# Patient Record
Sex: FEMALE | Race: WHITE
[De-identification: names, ages, dates, MRNs, and addresses within clinical notes are randomized per-mention and may not be internally consistent; named-entity substitution may affect disease eponyms.]

---

## 2019-09-12 ENCOUNTER — HOSPITAL ENCOUNTER (INPATIENT)
Dept: HOSPITAL 47 - EC | Age: 54
LOS: 13 days | Discharge: NURSING, REHAB OR LONG-TERM CARE | DRG: 981 | End: 2019-09-25
Attending: HOSPITALIST | Admitting: HOSPITALIST
Payer: COMMERCIAL

## 2019-09-12 VITALS — BODY MASS INDEX: 11.4 KG/M2

## 2019-09-12 DIAGNOSIS — H91.90: ICD-10-CM

## 2019-09-12 DIAGNOSIS — R64: ICD-10-CM

## 2019-09-12 DIAGNOSIS — E86.1: ICD-10-CM

## 2019-09-12 DIAGNOSIS — F41.9: ICD-10-CM

## 2019-09-12 DIAGNOSIS — Z82.5: ICD-10-CM

## 2019-09-12 DIAGNOSIS — R59.0: ICD-10-CM

## 2019-09-12 DIAGNOSIS — M54.5: ICD-10-CM

## 2019-09-12 DIAGNOSIS — J96.22: Primary | ICD-10-CM

## 2019-09-12 DIAGNOSIS — E83.42: ICD-10-CM

## 2019-09-12 DIAGNOSIS — E87.2: ICD-10-CM

## 2019-09-12 DIAGNOSIS — J96.21: ICD-10-CM

## 2019-09-12 DIAGNOSIS — T50.2X5A: ICD-10-CM

## 2019-09-12 DIAGNOSIS — Z82.3: ICD-10-CM

## 2019-09-12 DIAGNOSIS — J90: ICD-10-CM

## 2019-09-12 DIAGNOSIS — G47.00: ICD-10-CM
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
## 2019-09-12 DIAGNOSIS — J38.4: ICD-10-CM

## 2019-09-12 DIAGNOSIS — Z98.890: ICD-10-CM

## 2019-09-12 DIAGNOSIS — J98.11: ICD-10-CM

## 2019-09-12 DIAGNOSIS — Z91.018: ICD-10-CM

## 2019-09-12 DIAGNOSIS — Z82.49: ICD-10-CM

## 2019-09-12 DIAGNOSIS — Z91.013: ICD-10-CM

## 2019-09-12 DIAGNOSIS — Z85.21: ICD-10-CM

## 2019-09-12 DIAGNOSIS — J69.0: ICD-10-CM

## 2019-09-12 DIAGNOSIS — E87.70: ICD-10-CM

## 2019-09-12 DIAGNOSIS — Z92.3: ICD-10-CM

## 2019-09-12 DIAGNOSIS — Z98.51: ICD-10-CM

## 2019-09-12 DIAGNOSIS — J44.9: ICD-10-CM

## 2019-09-12 DIAGNOSIS — T38.0X5A: ICD-10-CM

## 2019-09-12 DIAGNOSIS — I95.9: ICD-10-CM

## 2019-09-12 DIAGNOSIS — G93.41: ICD-10-CM

## 2019-09-12 DIAGNOSIS — Z92.21: ICD-10-CM

## 2019-09-12 DIAGNOSIS — Z87.891: ICD-10-CM

## 2019-09-12 DIAGNOSIS — E43: ICD-10-CM

## 2019-09-12 DIAGNOSIS — E87.3: ICD-10-CM

## 2019-09-12 DIAGNOSIS — E87.6: ICD-10-CM

## 2019-09-12 DIAGNOSIS — Z83.3: ICD-10-CM

## 2019-09-12 DIAGNOSIS — D63.8: ICD-10-CM

## 2019-09-12 DIAGNOSIS — R13.10: ICD-10-CM

## 2019-09-12 DIAGNOSIS — E87.1: ICD-10-CM

## 2019-09-12 LAB
ALBUMIN SERPL-MCNC: 3.5 G/DL (ref 3.5–5)
ALP SERPL-CCNC: 132 U/L (ref 38–126)
ALT SERPL-CCNC: 17 U/L (ref 9–52)
ANION GAP SERPL CALC-SCNC: 12 MMOL/L
APTT BLD: 30.9 SEC (ref 22–30)
AST SERPL-CCNC: 22 U/L (ref 14–36)
BASOPHILS # BLD AUTO: 0.1 K/UL (ref 0–0.2)
BASOPHILS NFR BLD AUTO: 1 %
BUN SERPL-SCNC: 9 MG/DL (ref 7–17)
CALCIUM SPEC-MCNC: 9.1 MG/DL (ref 8.4–10.2)
CHLORIDE SERPL-SCNC: 65 MMOL/L (ref 98–107)
CK SERPL-CCNC: 37 U/L (ref 30–135)
CO2 BLDA-SCNC: 39 MMOL/L (ref 19–24)
CO2 SERPL-SCNC: 34 MMOL/L (ref 22–30)
EOSINOPHIL # BLD AUTO: 0.1 K/UL (ref 0–0.7)
EOSINOPHIL NFR BLD AUTO: 1 %
ERYTHROCYTE [DISTWIDTH] IN BLOOD BY AUTOMATED COUNT: 3.92 M/UL (ref 3.8–5.4)
ERYTHROCYTE [DISTWIDTH] IN BLOOD: 12.2 % (ref 11.5–15.5)
GLUCOSE BLD-MCNC: 179 MG/DL (ref 75–99)
GLUCOSE SERPL-MCNC: 118 MG/DL (ref 74–99)
HCO3 BLDA-SCNC: 35 MMOL/L (ref 21–25)
HCT VFR BLD AUTO: 36.2 % (ref 34–46)
HGB BLD-MCNC: 12.3 GM/DL (ref 11.4–16)
INR PPP: 1.3 (ref ?–1.2)
LYMPHOCYTES # SPEC AUTO: 0 K/UL (ref 1–4.8)
LYMPHOCYTES NFR SPEC AUTO: 0 %
MAGNESIUM SPEC-SCNC: 1.3 MG/DL (ref 1.6–2.3)
MCH RBC QN AUTO: 31.3 PG (ref 25–35)
MCHC RBC AUTO-ENTMCNC: 33.9 G/DL (ref 31–37)
MCV RBC AUTO: 92.5 FL (ref 80–100)
MONOCYTES # BLD AUTO: 0.3 K/UL (ref 0–1)
MONOCYTES NFR BLD AUTO: 3 %
NEUTROPHILS # BLD AUTO: 9.1 K/UL (ref 1.3–7.7)
NEUTROPHILS NFR BLD AUTO: 95 %
PCO2 BLDA: 107 MMHG (ref 35–45)
PH BLDA: 7.13 [PH] (ref 7.35–7.45)
PH UR: 6.5 [PH] (ref 5–8)
PLATELET # BLD AUTO: 371 K/UL (ref 150–450)
PO2 BLDA: 79 MMHG (ref 83–108)
POTASSIUM SERPL-SCNC: 4.9 MMOL/L (ref 3.5–5.1)
PROT SERPL-MCNC: 6.7 G/DL (ref 6.3–8.2)
PT BLD: 13 SEC (ref 9–12)
SODIUM SERPL-SCNC: 111 MMOL/L (ref 137–145)
SP GR UR: 1.02 (ref 1–1.03)
UROBILINOGEN UR QL STRIP: <2 MG/DL (ref ?–2)
WBC # BLD AUTO: 9.7 K/UL (ref 3.8–10.6)

## 2019-09-12 PROCEDURE — 5A1945Z RESPIRATORY VENTILATION, 24-96 CONSECUTIVE HOURS: ICD-10-PCS

## 2019-09-12 PROCEDURE — 84132 ASSAY OF SERUM POTASSIUM: CPT

## 2019-09-12 PROCEDURE — 81003 URINALYSIS AUTO W/O SCOPE: CPT

## 2019-09-12 PROCEDURE — 74177 CT ABD & PELVIS W/CONTRAST: CPT

## 2019-09-12 PROCEDURE — 83735 ASSAY OF MAGNESIUM: CPT

## 2019-09-12 PROCEDURE — 83935 ASSAY OF URINE OSMOLALITY: CPT

## 2019-09-12 PROCEDURE — 87205 SMEAR GRAM STAIN: CPT

## 2019-09-12 PROCEDURE — 84484 ASSAY OF TROPONIN QUANT: CPT

## 2019-09-12 PROCEDURE — 83605 ASSAY OF LACTIC ACID: CPT

## 2019-09-12 PROCEDURE — 99291 CRITICAL CARE FIRST HOUR: CPT

## 2019-09-12 PROCEDURE — 84443 ASSAY THYROID STIM HORMONE: CPT

## 2019-09-12 PROCEDURE — 74230 X-RAY XM SWLNG FUNCJ C+: CPT

## 2019-09-12 PROCEDURE — 84478 ASSAY OF TRIGLYCERIDES: CPT

## 2019-09-12 PROCEDURE — 96375 TX/PRO/DX INJ NEW DRUG ADDON: CPT

## 2019-09-12 PROCEDURE — 86850 RBC ANTIBODY SCREEN: CPT

## 2019-09-12 PROCEDURE — 86901 BLOOD TYPING SEROLOGIC RH(D): CPT

## 2019-09-12 PROCEDURE — 0BH17EZ INSERTION OF ENDOTRACHEAL AIRWAY INTO TRACHEA, VIA NATURAL OR ARTIFICIAL OPENING: ICD-10-PCS

## 2019-09-12 PROCEDURE — 36573 INSJ PICC RS&I 5 YR+: CPT

## 2019-09-12 PROCEDURE — 83690 ASSAY OF LIPASE: CPT

## 2019-09-12 PROCEDURE — 83880 ASSAY OF NATRIURETIC PEPTIDE: CPT

## 2019-09-12 PROCEDURE — 85027 COMPLETE CBC AUTOMATED: CPT

## 2019-09-12 PROCEDURE — 94660 CPAP INITIATION&MGMT: CPT

## 2019-09-12 PROCEDURE — 82550 ASSAY OF CK (CPK): CPT

## 2019-09-12 PROCEDURE — 82533 TOTAL CORTISOL: CPT

## 2019-09-12 PROCEDURE — 82330 ASSAY OF CALCIUM: CPT

## 2019-09-12 PROCEDURE — 93005 ELECTROCARDIOGRAM TRACING: CPT

## 2019-09-12 PROCEDURE — 80053 COMPREHEN METABOLIC PANEL: CPT

## 2019-09-12 PROCEDURE — 85025 COMPLETE CBC W/AUTO DIFF WBC: CPT

## 2019-09-12 PROCEDURE — 43235 EGD DIAGNOSTIC BRUSH WASH: CPT

## 2019-09-12 PROCEDURE — 84100 ASSAY OF PHOSPHORUS: CPT

## 2019-09-12 PROCEDURE — 71045 X-RAY EXAM CHEST 1 VIEW: CPT

## 2019-09-12 PROCEDURE — 80048 BASIC METABOLIC PNL TOTAL CA: CPT

## 2019-09-12 PROCEDURE — 94003 VENT MGMT INPAT SUBQ DAY: CPT

## 2019-09-12 PROCEDURE — 74022 RADEX COMPL AQT ABD SERIES: CPT

## 2019-09-12 PROCEDURE — 76604 US EXAM CHEST: CPT

## 2019-09-12 PROCEDURE — 96361 HYDRATE IV INFUSION ADD-ON: CPT

## 2019-09-12 PROCEDURE — 94002 VENT MGMT INPAT INIT DAY: CPT

## 2019-09-12 PROCEDURE — 36415 COLL VENOUS BLD VENIPUNCTURE: CPT

## 2019-09-12 PROCEDURE — 87040 BLOOD CULTURE FOR BACTERIA: CPT

## 2019-09-12 PROCEDURE — 82805 BLOOD GASES W/O2 SATURATION: CPT

## 2019-09-12 PROCEDURE — 94770: CPT

## 2019-09-12 PROCEDURE — 94640 AIRWAY INHALATION TREATMENT: CPT

## 2019-09-12 PROCEDURE — 96374 THER/PROPH/DIAG INJ IV PUSH: CPT

## 2019-09-12 PROCEDURE — 87070 CULTURE OTHR SPECIMN AEROBIC: CPT

## 2019-09-12 PROCEDURE — 36600 WITHDRAWAL OF ARTERIAL BLOOD: CPT

## 2019-09-12 PROCEDURE — 71275 CT ANGIOGRAPHY CHEST: CPT

## 2019-09-12 PROCEDURE — 86900 BLOOD TYPING SEROLOGIC ABO: CPT

## 2019-09-12 PROCEDURE — 94760 N-INVAS EAR/PLS OXIMETRY 1: CPT

## 2019-09-12 PROCEDURE — 85610 PROTHROMBIN TIME: CPT

## 2019-09-12 PROCEDURE — 84295 ASSAY OF SERUM SODIUM: CPT

## 2019-09-12 PROCEDURE — 84300 ASSAY OF URINE SODIUM: CPT

## 2019-09-12 PROCEDURE — 85730 THROMBOPLASTIN TIME PARTIAL: CPT

## 2019-09-12 NOTE — XR
EXAMINATION TYPE: XR abdomen acute w cxr

 

DATE OF EXAM: 9/12/2019

 

COMPARISON: NONE

 

HISTORY: Short of breath

 

TECHNIQUE:  Chest x-ray with supine and upright abdomen

 

FINDINGS:  

 

There is coarse infiltrate throughout the lungs. Heart size is normal. There is right central venous 
catheter with tip in the right atrium. There is slight blunting right costophrenic angle.

 

There is no sign of intestinal obstruction or pneumoperitoneum. Fecal pattern is normal. There are no
 pathologic calcifications over the kidneys.

 

 

IMPRESSION: 

Nonacute abdomen.

 

Bilateral patchy pulmonary interstitial and alveolar infiltrates. Small right pleural effusion. This 
likely relates to inflammatory disease. No definite heart failure.

## 2019-09-12 NOTE — CT
EXAMINATION TYPE: CT abdomen pelvis w con

 

DATE OF EXAM: 9/12/2019

 

COMPARISON: None

 

HISTORY: Difficulty breathing. History of cancer.

 

CT DLP: 407.9 mGycm

Automated exposure control for dose reduction was used.

 

TECHNIQUE:  Helical acquisition of images was performed from the lung bases through the pelvis.

 

CONTRAST: 

Performed without Oral Contrast and with IV Contrast, patient injected with mL of Isovue 370.

 

FINDINGS: 

 

There are extensive airspace infiltrates at the lung bases. There is atelectasis at the lung bases. T
here are small pleural effusions. Heart size is normal.

 

Liver shows no focal defect. Bile ducts are not dilated. There are calcified splenic granulomata. I s
ee no sign of a pancreatic mass.

 

There is no adrenal mass. Kidneys show satisfactory contrast opacification. There is no hydronephrosi
s. Ureters are not dilated. There is subcutaneous edema around the abdomen that could relate to anasa
rca. There is no sign of free air. There is no evidence of a bowel obstruction. Bladder distends smoo
thly. There is no free fluid in the pelvis. There is no sign of mesenteric edema. Appendix is not see
n. There is no sign of thickened appendix.

 

Lumbar spine is intact. Bony pelvis is intact. There is no compression fracture. There is mild spurri
ng at L4-5.

IMPRESSION: 

BASILAR PULMONARY AIRSPACE INFILTRATE AND ATELECTASIS WITH SMALL PLEURAL EFFUSIONS.

 

NO ACUTE ABNORMALITY WITHIN THE ABDOMEN PELVIS.

## 2019-09-12 NOTE — CT
EXAMINATION TYPE: CT angio chest

 

DATE OF EXAM: 9/12/2019 7:35 PM

 

COMPARISON: None

 

HISTORY: Difficulty breathing. History of cancer.

 

CT DLP: 175.9 mGycm

Automated exposure control for dose reduction was used.

 

CONTRAST: 

CTA scan of the thorax is performed with IV Contrast, patient injected with 83 mL of Isovue 370, pulm
onary embolism protocol. .  

There are 3-D post processed images.

FINDINGS:

 

There is diffuse pulmonary emphysema. There are multiple patchy areas of airspace consolidation in rere
th lungs involving upper lobes and lower lobes. There are small pleural effusions. Heart size is norm
al.

 

There are enlarged bilateral multiple bronchial lymph nodes that measure up to 2 cm. There is subcari
nal adenopathy that measures 3 cm.

 

Thoracic aorta shows no aneurysm or dissection.

 

There is normal contrast opacification of the pulmonary arteries. There are no filling defects.

The thoracic spine shows no compression fracture. I see no bony destructive process.

 

IMPRESSION: 

EMPHYSEMA. EXTENSIVE PULMONARY INTERSTITIAL AND AIRSPACE INFILTRATES.

 

NO EVIDENCE OF PULMONARY EMBOLISM. EXTENSIVE BRONCHIAL ADENOPATHY.

## 2019-09-12 NOTE — ED
Weakness HPI





- General


Stated complaint: TIARRA


Time Seen by Provider: 09/12/19 16:54


Source: RN notes reviewed, old records reviewed


Limitations: no limitations





- History of Present Illness


Initial comments: 





This is a 33-year-old female the ER for evaluation.  Patient coming in for 

multiple complaints severe weakness and inability to sleep and shortness of 

breath.  Patient has history of throat cancer, she admits to severe decreased 

appetite and inability to eat or drink.  Her oxygen is in the low to mid 70s on 

room air and patient is not on home O2.  Patient complaining of chest pain 

abdominal pain and generalized body pain.  No fevers.  Patient presented to 

urgent care was mainly directed the ER for further management.  Patient states 

she's not been on emergency department before.  She has follow-up with doctors 

in [place].


MD Complaint: generalized weakness, lack of energy (Inability to sleep)


-: days(s)


Location: generalized


Severity: moderate


Severity scale (1-10): 7


Consistency: constant


Improves with: none


Worsens with: none


Context: recent illness, history of similar


Associated Symptoms: loss of appetite, nausea/vomiting, shortness of breath





- Related Data


                                Home Medications











 Medication  Instructions  Recorded  Confirmed


 


Ibuprofen [Advil] 200 mg PO Q8HR PRN 09/12/19 09/12/19


 


guaiFENesin [Mucinex] 600 mg PO BID PRN 09/12/19 09/12/19











                                    Allergies











Allergy/AdvReac Type Severity Reaction Status Date / Time


 


No Known Allergies Allergy   Verified 09/12/19 17:35














Review of Systems


ROS Statement: 


Those systems with pertinent positive or pertinent negative responses have been 

documented in the HPI.





ROS Other: All systems not noted in ROS Statement are negative.





General Exam


General appearance: alert, anxious, lethargic, in distress, cachectic


Head exam: Present: atraumatic, normocephalic, normal inspection


Eye exam: Present: normal appearance, EOMI.  Absent: scleral icterus, 

conjunctival injection, periorbital swelling


ENT exam: Present: normal exam, mucous membranes dry


Neck exam: Present: normal inspection.  Absent: tenderness, meningismus, 

lymphadenopathy


Respiratory exam: Present: respiratory distress, wheezes, accessory muscle use, 

decreased breath sounds, prolonged expiratory.  Absent: rales, rhonchi, stridor


Cardiovascular Exam: Present: regular rate, normal rhythm, normal heart sounds. 

 Absent: systolic murmur, diastolic murmur, rubs, gallop, clicks


GI/Abdominal exam: Present: soft, normal bowel sounds.  Absent: distended, 

tenderness, guarding, rebound, rigid


Extremities exam: Present: normal inspection, full ROM, normal capillary refill.

  Absent: tenderness, pedal edema, joint swelling, calf tenderness


Back exam: Present: normal inspection


Neurological exam: Present: alert, oriented X3, CN II-XII intact


Psychiatric exam: Present: normal affect, normal mood


Skin exam: Present: warm, dry, intact, normal color.  Absent: rash





Course


                                   Vital Signs











  09/12/19 09/12/19 09/12/19





  17:02 17:41 17:57


 


Temperature 98.8 F  


 


Pulse Rate 115 H 106 H 107 H


 


Respiratory 17  





Rate   


 


Blood Pressure 121/91  


 


O2 Sat by Pulse 79 L  





Oximetry   














- Reevaluation(s)


Reevaluation #1: 





09/12/19 17:07


Medical record reviewed, noncontributory


Reevaluation #2: 





09/12/19 19:07


Patient again denies recent travel history or history of DVT but does have 

history of CA


09/12/19 19:07








- Consultations


Consultation #1: 





spoke with Dr Howell regarding ICU admission he is agreeable


Consultation #2: 





spoke with Dr Ward regarding admission he is agreeable





EKG Findings





- EKG Comments:


EKG Findings:: EKG shows sinus tach rate of 107, , QRS 74, QTc 416





Medical Decision Making





- Medical Decision Making





53-year-old female presenting respiratory distress from COPD and hypoxia.  

Patient also found to have severe hyponatremia will be started on hypertonic 

saline protocol.  Patient to be admitted ICU for significant retention 

evaluation and monitoring





- Lab Data


Result diagrams: 


                                 09/12/19 17:18





                                 09/12/19 17:18


                                   Lab Results











  09/12/19 09/12/19 09/12/19 Range/Units





  17:18 17:18 17:18 


 


WBC   9.7   (3.8-10.6)  k/uL


 


RBC   3.92   (3.80-5.40)  m/uL


 


Hgb   12.3   (11.4-16.0)  gm/dL


 


Hct   36.2   (34.0-46.0)  %


 


MCV   92.5   (80.0-100.0)  fL


 


MCH   31.3   (25.0-35.0)  pg


 


MCHC   33.9   (31.0-37.0)  g/dL


 


RDW   12.2   (11.5-15.5)  %


 


Plt Count   371   (150-450)  k/uL


 


Neutrophils %   95   %


 


Lymphocytes %   0   %


 


Monocytes %   3   %


 


Eosinophils %   1   %


 


Basophils %   1   %


 


Neutrophils #   9.1 H   (1.3-7.7)  k/uL


 


Lymphocytes #   0.0 L   (1.0-4.8)  k/uL


 


Monocytes #   0.3   (0-1.0)  k/uL


 


Eosinophils #   0.1   (0-0.7)  k/uL


 


Basophils #   0.1   (0-0.2)  k/uL


 


PT     (9.0-12.0)  sec


 


INR     (<1.2)  


 


APTT     (22.0-30.0)  sec


 


Sodium  111 L*    (137-145)  mmol/L


 


Potassium  4.9    (3.5-5.1)  mmol/L


 


Chloride  65 L*    ()  mmol/L


 


Carbon Dioxide  34 H    (22-30)  mmol/L


 


Anion Gap  12    mmol/L


 


BUN  9    (7-17)  mg/dL


 


Creatinine  0.20 L    (0.52-1.04)  mg/dL


 


Est GFR (CKD-EPI)AfAm  >90    (>60 ml/min/1.73 sqM)  


 


Est GFR (CKD-EPI)NonAf  >90    (>60 ml/min/1.73 sqM)  


 


Glucose  118 H    (74-99)  mg/dL


 


Plasma Lactic Acid German    1.7  (0.7-2.0)  mmol/L


 


Calcium  9.1    (8.4-10.2)  mg/dL


 


Phosphorus  3.3    (2.5-4.5)  mg/dL


 


Magnesium  1.3 L    (1.6-2.3)  mg/dL


 


Total Bilirubin  0.7    (0.2-1.3)  mg/dL


 


AST  22    (14-36)  U/L


 


ALT  17    (9-52)  U/L


 


Alkaline Phosphatase  132 H    ()  U/L


 


Creatine Kinase  37    ()  U/L


 


Troponin I     (0.000-0.034)  ng/mL


 


NT-Pro-B Natriuret Pep     pg/mL


 


Total Protein  6.7    (6.3-8.2)  g/dL


 


Albumin  3.5    (3.5-5.0)  g/dL


 


Lipase  10 L    ()  U/L


 


TSH  4.000    (0.465-4.680)  mIU/L


 


Blood Type Recheck     


 


Bld Type Recheck Status     


 


Spec Expiration Date     














  09/12/19 09/12/19 09/12/19 Range/Units





  17:18 17:18 17:18 


 


WBC     (3.8-10.6)  k/uL


 


RBC     (3.80-5.40)  m/uL


 


Hgb     (11.4-16.0)  gm/dL


 


Hct     (34.0-46.0)  %


 


MCV     (80.0-100.0)  fL


 


MCH     (25.0-35.0)  pg


 


MCHC     (31.0-37.0)  g/dL


 


RDW     (11.5-15.5)  %


 


Plt Count     (150-450)  k/uL


 


Neutrophils %     %


 


Lymphocytes %     %


 


Monocytes %     %


 


Eosinophils %     %


 


Basophils %     %


 


Neutrophils #     (1.3-7.7)  k/uL


 


Lymphocytes #     (1.0-4.8)  k/uL


 


Monocytes #     (0-1.0)  k/uL


 


Eosinophils #     (0-0.7)  k/uL


 


Basophils #     (0-0.2)  k/uL


 


PT   13.0 H   (9.0-12.0)  sec


 


INR   1.3 H   (<1.2)  


 


APTT   30.9 H   (22.0-30.0)  sec


 


Sodium     (137-145)  mmol/L


 


Potassium     (3.5-5.1)  mmol/L


 


Chloride     ()  mmol/L


 


Carbon Dioxide     (22-30)  mmol/L


 


Anion Gap     mmol/L


 


BUN     (7-17)  mg/dL


 


Creatinine     (0.52-1.04)  mg/dL


 


Est GFR (CKD-EPI)AfAm     (>60 ml/min/1.73 sqM)  


 


Est GFR (CKD-EPI)NonAf     (>60 ml/min/1.73 sqM)  


 


Glucose     (74-99)  mg/dL


 


Plasma Lactic Acid German     (0.7-2.0)  mmol/L


 


Calcium     (8.4-10.2)  mg/dL


 


Phosphorus     (2.5-4.5)  mg/dL


 


Magnesium     (1.6-2.3)  mg/dL


 


Total Bilirubin     (0.2-1.3)  mg/dL


 


AST     (14-36)  U/L


 


ALT     (9-52)  U/L


 


Alkaline Phosphatase     ()  U/L


 


Creatine Kinase     ()  U/L


 


Troponin I    <0.012  (0.000-0.034)  ng/mL


 


NT-Pro-B Natriuret Pep  502    pg/mL


 


Total Protein     (6.3-8.2)  g/dL


 


Albumin     (3.5-5.0)  g/dL


 


Lipase     ()  U/L


 


TSH     (0.465-4.680)  mIU/L


 


Blood Type Recheck     


 


Bld Type Recheck Status     


 


Spec Expiration Date     














  09/12/19 Range/Units





  17:30 


 


WBC   (3.8-10.6)  k/uL


 


RBC   (3.80-5.40)  m/uL


 


Hgb   (11.4-16.0)  gm/dL


 


Hct   (34.0-46.0)  %


 


MCV   (80.0-100.0)  fL


 


MCH   (25.0-35.0)  pg


 


MCHC   (31.0-37.0)  g/dL


 


RDW   (11.5-15.5)  %


 


Plt Count   (150-450)  k/uL


 


Neutrophils %   %


 


Lymphocytes %   %


 


Monocytes %   %


 


Eosinophils %   %


 


Basophils %   %


 


Neutrophils #   (1.3-7.7)  k/uL


 


Lymphocytes #   (1.0-4.8)  k/uL


 


Monocytes #   (0-1.0)  k/uL


 


Eosinophils #   (0-0.7)  k/uL


 


Basophils #   (0-0.2)  k/uL


 


PT   (9.0-12.0)  sec


 


INR   (<1.2)  


 


APTT   (22.0-30.0)  sec


 


Sodium   (137-145)  mmol/L


 


Potassium   (3.5-5.1)  mmol/L


 


Chloride   ()  mmol/L


 


Carbon Dioxide   (22-30)  mmol/L


 


Anion Gap   mmol/L


 


BUN   (7-17)  mg/dL


 


Creatinine   (0.52-1.04)  mg/dL


 


Est GFR (CKD-EPI)AfAm   (>60 ml/min/1.73 sqM)  


 


Est GFR (CKD-EPI)NonAf   (>60 ml/min/1.73 sqM)  


 


Glucose   (74-99)  mg/dL


 


Plasma Lactic Acid German   (0.7-2.0)  mmol/L


 


Calcium   (8.4-10.2)  mg/dL


 


Phosphorus   (2.5-4.5)  mg/dL


 


Magnesium   (1.6-2.3)  mg/dL


 


Total Bilirubin   (0.2-1.3)  mg/dL


 


AST   (14-36)  U/L


 


ALT   (9-52)  U/L


 


Alkaline Phosphatase   ()  U/L


 


Creatine Kinase   ()  U/L


 


Troponin I   (0.000-0.034)  ng/mL


 


NT-Pro-B Natriuret Pep   pg/mL


 


Total Protein   (6.3-8.2)  g/dL


 


Albumin   (3.5-5.0)  g/dL


 


Lipase   ()  U/L


 


TSH   (0.465-4.680)  mIU/L


 


Blood Type Recheck  No Previous Record  


 


Bld Type Recheck Status  CABO Indicated  


 


Spec Expiration Date  09/15/2019 - 4950  














- Radiology Data


Radiology results: report reviewed (X-ray abdominal surgeries a chest is 

negative for acute disease), image reviewed





Critical Care Time


Critical Care Time: Yes


Total Critical Care Time: 65





Disposition


Clinical Impression: 


 Hyponatremia, Weakness, COPD with hypoxia





Disposition: ADMITTED AS IP TO THIS [facility]


Condition: Serious


Is patient prescribed a controlled substance at d/c from ED?: No


Referrals: 


None,Stated [Primary Care Provider] - 1-2 days

## 2019-09-13 LAB
ALBUMIN SERPL-MCNC: 2.6 G/DL (ref 3.5–5)
ALP SERPL-CCNC: 90 U/L (ref 38–126)
ALT SERPL-CCNC: 15 U/L (ref 9–52)
ANION GAP SERPL CALC-SCNC: 6 MMOL/L
AST SERPL-CCNC: 14 U/L (ref 14–36)
BASOPHILS # BLD AUTO: 0 K/UL (ref 0–0.2)
BASOPHILS NFR BLD AUTO: 0 %
BUN SERPL-SCNC: 7 MG/DL (ref 7–17)
CALCIUM SPEC-MCNC: 8.6 MG/DL (ref 8.4–10.2)
CHLORIDE SERPL-SCNC: 79 MMOL/L (ref 98–107)
CO2 BLDA-SCNC: 35 MMOL/L (ref 19–24)
CO2 BLDA-SCNC: 36 MMOL/L (ref 19–24)
CO2 BLDA-SCNC: 37 MMOL/L (ref 19–24)
CO2 SERPL-SCNC: 32 MMOL/L (ref 22–30)
EOSINOPHIL # BLD AUTO: 0.1 K/UL (ref 0–0.7)
EOSINOPHIL NFR BLD AUTO: 1 %
ERYTHROCYTE [DISTWIDTH] IN BLOOD BY AUTOMATED COUNT: 3.28 M/UL (ref 3.8–5.4)
ERYTHROCYTE [DISTWIDTH] IN BLOOD: 13 % (ref 11.5–15.5)
GLUCOSE BLD-MCNC: 169 MG/DL (ref 75–99)
GLUCOSE BLD-MCNC: 178 MG/DL (ref 75–99)
GLUCOSE BLD-MCNC: 95 MG/DL (ref 75–99)
GLUCOSE SERPL-MCNC: 155 MG/DL (ref 74–99)
HCO3 BLDA-SCNC: 33 MMOL/L (ref 21–25)
HCO3 BLDA-SCNC: 34 MMOL/L (ref 21–25)
HCO3 BLDA-SCNC: 35 MMOL/L (ref 21–25)
HCT VFR BLD AUTO: 31.4 % (ref 34–46)
HGB BLD-MCNC: 10.5 GM/DL (ref 11.4–16)
LYMPHOCYTES # SPEC AUTO: 0.1 K/UL (ref 1–4.8)
LYMPHOCYTES NFR SPEC AUTO: 1 %
MAGNESIUM SPEC-SCNC: 1.5 MG/DL (ref 1.6–2.3)
MCH RBC QN AUTO: 32 PG (ref 25–35)
MCHC RBC AUTO-ENTMCNC: 33.4 G/DL (ref 31–37)
MCV RBC AUTO: 95.8 FL (ref 80–100)
MONOCYTES # BLD AUTO: 0.2 K/UL (ref 0–1)
MONOCYTES NFR BLD AUTO: 3 %
NEUTROPHILS # BLD AUTO: 8.1 K/UL (ref 1.3–7.7)
NEUTROPHILS NFR BLD AUTO: 94 %
PCO2 BLDA: 54 MMHG (ref 35–45)
PCO2 BLDA: 55 MMHG (ref 35–45)
PCO2 BLDA: 67 MMHG (ref 35–45)
PH BLDA: 7.3 [PH] (ref 7.35–7.45)
PH BLDA: 7.41 [PH] (ref 7.35–7.45)
PH BLDA: 7.42 [PH] (ref 7.35–7.45)
PH UR: 7 [PH] (ref 5–8)
PLATELET # BLD AUTO: 383 K/UL (ref 150–450)
PO2 BLDA: 106 MMHG (ref 83–108)
PO2 BLDA: 66 MMHG (ref 83–108)
PO2 BLDA: 75 MMHG (ref 83–108)
POTASSIUM SERPL-SCNC: 3.4 MMOL/L (ref 3.5–5.1)
POTASSIUM SERPL-SCNC: 4 MMOL/L (ref 3.5–5.1)
PROT SERPL-MCNC: 5.2 G/DL (ref 6.3–8.2)
SODIUM SERPL-SCNC: 117 MMOL/L (ref 137–145)
SODIUM SERPL-SCNC: 122 MMOL/L (ref 137–145)
SP GR UR: 1.01 (ref 1–1.03)
UROBILINOGEN UR QL STRIP: <2 MG/DL (ref ?–2)
WBC # BLD AUTO: 8.6 K/UL (ref 3.8–10.6)

## 2019-09-13 PROCEDURE — 3E0G76Z INTRODUCTION OF NUTRITIONAL SUBSTANCE INTO UPPER GI, VIA NATURAL OR ARTIFICIAL OPENING: ICD-10-PCS

## 2019-09-13 PROCEDURE — 05HC33Z INSERTION OF INFUSION DEVICE INTO LEFT BASILIC VEIN, PERCUTANEOUS APPROACH: ICD-10-PCS

## 2019-09-13 PROCEDURE — 02HV33Z INSERTION OF INFUSION DEVICE INTO SUPERIOR VENA CAVA, PERCUTANEOUS APPROACH: ICD-10-PCS

## 2019-09-13 PROCEDURE — 0DH67UZ INSERTION OF FEEDING DEVICE INTO STOMACH, VIA NATURAL OR ARTIFICIAL OPENING: ICD-10-PCS

## 2019-09-13 RX ADMIN — MORPHINE SULFATE PRN MG: 4 INJECTION, SOLUTION INTRAMUSCULAR; INTRAVENOUS at 18:13

## 2019-09-13 RX ADMIN — HEPARIN SODIUM SCH UNIT: 5000 INJECTION, SOLUTION INTRAVENOUS; SUBCUTANEOUS at 23:49

## 2019-09-13 RX ADMIN — IPRATROPIUM BROMIDE AND ALBUTEROL SULFATE PRN ML: .5; 3 SOLUTION RESPIRATORY (INHALATION) at 11:54

## 2019-09-13 RX ADMIN — LEVOFLOXACIN SCH MLS/HR: 5 INJECTION, SOLUTION INTRAVENOUS at 10:47

## 2019-09-13 RX ADMIN — POTASSIUM BICARBONATE SCH MEQ: 782 TABLET, EFFERVESCENT ORAL at 06:50

## 2019-09-13 RX ADMIN — MAGNESIUM SULFATE IN DEXTROSE SCH MLS/HR: 10 INJECTION, SOLUTION INTRAVENOUS at 06:50

## 2019-09-13 RX ADMIN — CHLORHEXIDINE GLUCONATE SCH ML: 1.2 RINSE ORAL at 21:10

## 2019-09-13 RX ADMIN — NOREPINEPHRINE BITARTRATE SCH MLS/HR: 1 INJECTION, SOLUTION, CONCENTRATE INTRAVENOUS at 10:01

## 2019-09-13 RX ADMIN — NOREPINEPHRINE BITARTRATE SCH MLS/HR: 1 INJECTION, SOLUTION, CONCENTRATE INTRAVENOUS at 00:00

## 2019-09-13 RX ADMIN — PROPOFOL SCH MLS/HR: 10 INJECTION, EMULSION INTRAVENOUS at 19:40

## 2019-09-13 RX ADMIN — PIPERACILLIN AND TAZOBACTAM SCH MLS/HR: 3; .375 INJECTION, POWDER, FOR SOLUTION INTRAVENOUS at 10:24

## 2019-09-13 RX ADMIN — HEPARIN SODIUM SCH UNIT: 5000 INJECTION, SOLUTION INTRAVENOUS; SUBCUTANEOUS at 16:32

## 2019-09-13 RX ADMIN — PIPERACILLIN AND TAZOBACTAM SCH MLS/HR: 3; .375 INJECTION, POWDER, FOR SOLUTION INTRAVENOUS at 21:09

## 2019-09-13 RX ADMIN — IPRATROPIUM BROMIDE AND ALBUTEROL SULFATE PRN ML: .5; 3 SOLUTION RESPIRATORY (INHALATION) at 07:36

## 2019-09-13 RX ADMIN — IPRATROPIUM BROMIDE AND ALBUTEROL SULFATE PRN ML: .5; 3 SOLUTION RESPIRATORY (INHALATION) at 19:30

## 2019-09-13 RX ADMIN — PROPOFOL SCH MLS/HR: 10 INJECTION, EMULSION INTRAVENOUS at 00:00

## 2019-09-13 RX ADMIN — IPRATROPIUM BROMIDE AND ALBUTEROL SULFATE PRN ML: .5; 3 SOLUTION RESPIRATORY (INHALATION) at 15:59

## 2019-09-13 RX ADMIN — HEPARIN SODIUM SCH UNIT: 5000 INJECTION, SOLUTION INTRAVENOUS; SUBCUTANEOUS at 08:03

## 2019-09-13 RX ADMIN — MAGNESIUM SULFATE IN DEXTROSE SCH MLS/HR: 10 INJECTION, SOLUTION INTRAVENOUS at 05:49

## 2019-09-13 RX ADMIN — CHLORHEXIDINE GLUCONATE SCH ML: 1.2 RINSE ORAL at 08:03

## 2019-09-13 RX ADMIN — POTASSIUM BICARBONATE SCH MEQ: 782 TABLET, EFFERVESCENT ORAL at 05:59

## 2019-09-13 RX ADMIN — PANTOPRAZOLE SODIUM SCH MG: 40 INJECTION, POWDER, FOR SOLUTION INTRAVENOUS at 08:03

## 2019-09-13 NOTE — XR
EXAM:

  XR Chest, 1 View

 

CLINICAL HISTORY:

  ITS.REASON XR Reason: line placement

 

TECHNIQUE:

  Frontal view of the chest.

 

COMPARISON:

  No relevant prior studies available.

 

FINDINGS:

  See Impression:

 

IMPRESSION:   

1.  Lines/tubes:

2.  Endotracheal tube tip located approximately 5 cm above the lesli. 

Transesophageal catheter side port below the GE junction, tip off the 

examination. Chest port identified with tip at or just below the 

cavoatrial junction.

3.  Bibasal pleural-parenchymal disease with suspected pleural effusions 

present.

4.  Infiltrate at the left apex.

5.  Interstitial alveolar disease at the right mid to lower lung zone.  

Prominent interstitial thickening involving the upper lobes.  

Differential includes atypical infection pulmonary edema.

## 2019-09-13 NOTE — P.PN
Subjective


Progress Note Date: 09/13/19


Principal diagnosis: 





The patient is a 53-year-old  female with a history of laryngeal cancer

status post chemo and radiation therapy that was admitted for acute respiratory 

failure with hypoxia, acute metabolic encephalopathy and symptomatic 

hyponatremia after presenting with progressive generalized weakness and 

difficulty breathing.  A workup with CT of her chest abdomen pelvis showed 

extensive bilateral lung infiltrates and consolidation with bilateral bronchial 

lymph node enlargement and subcarinal lymph node enlargement, initial Arterial 

blood gases revealed a pO2 of 79, pCO2 of 107 and a pH of 7.13 on 32% FiO2 and 

the patient was intubated and placed on mechanical ventilator.  She was started 

on empiric IV antibiotics with Levaquin and Zosyn, serum sodium level was 

profoundly low at 111 the patient was given hypertonic 3% saline at 20 mL an 

hour and subsequent switched to normal saline with nephrology Dr. Hutton 

consulted to manage the patient's hyponatremia.  The patient was sedated on 

propofol and started on levophed drip to keep map greater than 65.  





Patient seen and examined in follow-up today, intubated on mechanical 

ventilatory sedated on propofol.  The Levophed is going at 5.8 mcg/m, propofol 

at 50 mcg/kg/m. patient having adequate urine output





Objective





- Vital Signs


Vital signs: 


                                   Vital Signs











Temp  98.1 F   09/13/19 16:00


 


Pulse  74   09/13/19 17:00


 


Resp  20   09/13/19 17:00


 


BP  96/62   09/13/19 17:00


 


Pulse Ox  100   09/13/19 17:00








                                 Intake & Output











 09/12/19 09/13/19 09/13/19





 18:59 06:59 18:59


 


Intake Total  737.808 4833.711


 


Output Total  1250 1684


 


Balance  -394.102 262.711


 


Weight 34.019 kg 38.8 kg 38.8 kg


 


Intake:   


 


  IV  655 1050


 


    .9 kvo  150 200


 


    Dextrose 5% in Water 1,  250 650





    000 ml @ 50 mls/hr IV .   





    Q20H [facility] Rx#:[id number]   


 


    Dextrose 5%-0.45% NaCl 1,  255 





    000 ml @ 85 mls/hr IV .   





    [id number] [facility] Rx#:[id number]   


 


    Magnesium Sulfate-D5w Pmx   200





    1 gm In Dextrose/Water 1   





    100ml.bag @ 100 mls/hr   





    IVPB Q1H [facility] Rx#:   





    [id number]   


 


  Intake, IV Titration  140.898 886.711





  Amount   


 


    Levofloxacin 500Mg-D5w   100





    Pmx 500 mg In Dextrose/   





    Water 1 100ml.bag @ 100   





    mls/hr IVPB Q24H [facility] Rx#:   





    [id number]   


 


    Norepinephrine 4 mg In  133.717 186.711





    Sodium Chloride 0.9% 250   





    ml @ 0.05 MCG/KG/MIN 6.   





    481 mls/hr IV .Q24H [facility]   





    Rx#:[id number]   


 


    Piperacillin-Tazobactam 3   100





    .375 gm In Sodium   





    Chloride 0.9% 100 ml @ 25   





    mls/hr IVPB Q12HR [facility] Rx   





    #:[id number]   


 


    Propofol 1,000 mg In  7.181 





    Empty Bag 1 bag @ Titrate   





    IV .Q0M [facility] Rx#:   





    [id number]   


 


    Sodium Chloride 0.9% 500   500





    ml 500 ml @ 999 mls/hr IV   





    .Q31M [facility] Rx#:[id number]   


 


  Tube Feeding   10


 


  Other  60 


 


Output:   


 


  Urine  1250 1684


 


Other:   


 


  Voiding Method  Indwelling Catheter Indwelling Catheter








                       ABP, PAP, CO, CI - Last Documented











Arterial Blood Pressure        103/63

















- Exam





Constitutional: No acute distress, sedated and intubated, cachectic appearance





Eyes: Anicteric sclerae, moist conjunctiva, no lid-lag, PERRLA





ENMT: Bilateral temporal wasting oral endotracheal and G-tube secured in place 

NC/AT,Oropharynx clear, no erythema, exudates





Neck:Supple, FROM, no masses, or JVD, No carotid bruits; No thyromegaly





Lungs: Right Mediport subclavian in place no chest wall deformity





Cardiovascular: Heart regular in rate and rhythm,  No murmurs, gallops, or rubs 

no peripheral edema





Abdominal: Soft Nontender, nom distended, no guarding, no rebound or  rigidity, 

Normoactive bowel sounds No hepatomegaly, No splenomegaly,  No palpable mass No 

abdominal wall hernia noted 





Skin: Normal temperature, tone, texture, turgor, No induration No subcutaneous 

nodules, No rash, lesions, No ulcers





Extremities:No digital cyanosis No clubbing, Pedal pulses intact and  

symmetrical Radial pulses intact and symmetrical Normal gait and station, No 

calf tenderness   


      


Neuro: Sedated on propofol unable to fully assess








- Labs


CBC & Chem 7: 


                                 09/13/19 04:55





                                 09/13/19 16:33


Labs: 


                  Abnormal Lab Results - Last 24 Hours (Table)











  09/12/19 09/12/19 09/12/19 Range/Units





  17:18 17:18 17:18 


 


RBC     (3.80-5.40)  m/uL


 


Hgb     (11.4-16.0)  gm/dL


 


Hct     (34.0-46.0)  %


 


Neutrophils #   9.1 H   (1.3-7.7)  k/uL


 


Lymphocytes #   0.0 L   (1.0-4.8)  k/uL


 


PT    13.0 H  (9.0-12.0)  sec


 


INR    1.3 H  (<1.2)  


 


APTT    30.9 H  (22.0-30.0)  sec


 


ABG pH     (7.35-7.45)  


 


ABG pCO2     (35-45)  mmHg


 


ABG pO2     ()  mmHg


 


ABG HCO3     (21-25)  mmol/L


 


ABG Total CO2     (19-24)  mmol/L


 


ABG O2 Saturation     (94-97)  %


 


Sodium  111 L*    (137-145)  mmol/L


 


Potassium     (3.5-5.1)  mmol/L


 


Chloride  65 L*    ()  mmol/L


 


Carbon Dioxide  34 H    (22-30)  mmol/L


 


Creatinine  0.20 L    (0.52-1.04)  mg/dL


 


Glucose  118 H    (74-99)  mg/dL


 


POC Glucose (mg/dL)     (75-99)  mg/dL


 


Magnesium  1.3 L    (1.6-2.3)  mg/dL


 


Alkaline Phosphatase  132 H    ()  U/L


 


Total Protein     (6.3-8.2)  g/dL


 


Albumin     (3.5-5.0)  g/dL


 


Lipase  10 L    ()  U/L


 


Urine Glucose (UA)     (Negative)  














  09/12/19 09/12/19 09/12/19 Range/Units





  19:58 21:15 21:27 


 


RBC     (3.80-5.40)  m/uL


 


Hgb     (11.4-16.0)  gm/dL


 


Hct     (34.0-46.0)  %


 


Neutrophils #     (1.3-7.7)  k/uL


 


Lymphocytes #     (1.0-4.8)  k/uL


 


PT     (9.0-12.0)  sec


 


INR     (<1.2)  


 


APTT     (22.0-30.0)  sec


 


ABG pH     (7.35-7.45)  


 


ABG pCO2     (35-45)  mmHg


 


ABG pO2     ()  mmHg


 


ABG HCO3     (21-25)  mmol/L


 


ABG Total CO2     (19-24)  mmol/L


 


ABG O2 Saturation     (94-97)  %


 


Sodium  116 L*    (137-145)  mmol/L


 


Potassium     (3.5-5.1)  mmol/L


 


Chloride     ()  mmol/L


 


Carbon Dioxide     (22-30)  mmol/L


 


Creatinine     (0.52-1.04)  mg/dL


 


Glucose     (74-99)  mg/dL


 


POC Glucose (mg/dL)    179 H  (75-99)  mg/dL


 


Magnesium     (1.6-2.3)  mg/dL


 


Alkaline Phosphatase     ()  U/L


 


Total Protein     (6.3-8.2)  g/dL


 


Albumin     (3.5-5.0)  g/dL


 


Lipase     ()  U/L


 


Urine Glucose (UA)   Trace H   (Negative)  














  09/12/19 09/13/19 09/13/19 Range/Units





  23:15 00:20 01:12 


 


RBC     (3.80-5.40)  m/uL


 


Hgb     (11.4-16.0)  gm/dL


 


Hct     (34.0-46.0)  %


 


Neutrophils #     (1.3-7.7)  k/uL


 


Lymphocytes #     (1.0-4.8)  k/uL


 


PT     (9.0-12.0)  sec


 


INR     (<1.2)  


 


APTT     (22.0-30.0)  sec


 


ABG pH  7.13 L*  7.30 L   (7.35-7.45)  


 


ABG pCO2  107 H*  67 H   (35-45)  mmHg


 


ABG pO2  79 L  66 L   ()  mmHg


 


ABG HCO3  35 H  33 H   (21-25)  mmol/L


 


ABG Total CO2  39 H  35 H   (19-24)  mmol/L


 


ABG O2 Saturation  90.3 L  91.1 L   (94-97)  %


 


Sodium    118 L*  (137-145)  mmol/L


 


Potassium     (3.5-5.1)  mmol/L


 


Chloride     ()  mmol/L


 


Carbon Dioxide     (22-30)  mmol/L


 


Creatinine     (0.52-1.04)  mg/dL


 


Glucose     (74-99)  mg/dL


 


POC Glucose (mg/dL)     (75-99)  mg/dL


 


Magnesium     (1.6-2.3)  mg/dL


 


Alkaline Phosphatase     ()  U/L


 


Total Protein     (6.3-8.2)  g/dL


 


Albumin     (3.5-5.0)  g/dL


 


Lipase     ()  U/L


 


Urine Glucose (UA)     (Negative)  














  09/13/19 09/13/19 09/13/19 Range/Units





  04:55 04:55 04:57 


 


RBC  3.28 L    (3.80-5.40)  m/uL


 


Hgb  10.5 L    (11.4-16.0)  gm/dL


 


Hct  31.4 L    (34.0-46.0)  %


 


Neutrophils #  8.1 H    (1.3-7.7)  k/uL


 


Lymphocytes #  0.1 L    (1.0-4.8)  k/uL


 


PT     (9.0-12.0)  sec


 


INR     (<1.2)  


 


APTT     (22.0-30.0)  sec


 


ABG pH     (7.35-7.45)  


 


ABG pCO2     (35-45)  mmHg


 


ABG pO2     ()  mmHg


 


ABG HCO3     (21-25)  mmol/L


 


ABG Total CO2     (19-24)  mmol/L


 


ABG O2 Saturation     (94-97)  %


 


Sodium   117 L*   (137-145)  mmol/L


 


Potassium   3.4 L   (3.5-5.1)  mmol/L


 


Chloride   79 L   ()  mmol/L


 


Carbon Dioxide   32 H   (22-30)  mmol/L


 


Creatinine   0.16 L   (0.52-1.04)  mg/dL


 


Glucose   155 H   (74-99)  mg/dL


 


POC Glucose (mg/dL)    169 H  (75-99)  mg/dL


 


Magnesium   1.5 L   (1.6-2.3)  mg/dL


 


Alkaline Phosphatase     ()  U/L


 


Total Protein   5.2 L   (6.3-8.2)  g/dL


 


Albumin   2.6 L   (3.5-5.0)  g/dL


 


Lipase   <10 L   ()  U/L


 


Urine Glucose (UA)     (Negative)  














  09/13/19 09/13/19 09/13/19 Range/Units





  06:55 11:30 11:46 


 


RBC     (3.80-5.40)  m/uL


 


Hgb     (11.4-16.0)  gm/dL


 


Hct     (34.0-46.0)  %


 


Neutrophils #     (1.3-7.7)  k/uL


 


Lymphocytes #     (1.0-4.8)  k/uL


 


PT     (9.0-12.0)  sec


 


INR     (<1.2)  


 


APTT     (22.0-30.0)  sec


 


ABG pH     (7.35-7.45)  


 


ABG pCO2  55 H    (35-45)  mmHg


 


ABG pO2  75 L    ()  mmHg


 


ABG HCO3  35 H    (21-25)  mmol/L


 


ABG Total CO2  37 H    (19-24)  mmol/L


 


ABG O2 Saturation     (94-97)  %


 


Sodium   122 L   (137-145)  mmol/L


 


Potassium     (3.5-5.1)  mmol/L


 


Chloride     ()  mmol/L


 


Carbon Dioxide     (22-30)  mmol/L


 


Creatinine     (0.52-1.04)  mg/dL


 


Glucose     (74-99)  mg/dL


 


POC Glucose (mg/dL)    178 H  (75-99)  mg/dL


 


Magnesium     (1.6-2.3)  mg/dL


 


Alkaline Phosphatase     ()  U/L


 


Total Protein     (6.3-8.2)  g/dL


 


Albumin     (3.5-5.0)  g/dL


 


Lipase     ()  U/L


 


Urine Glucose (UA)     (Negative)  














  09/13/19 09/13/19 Range/Units





  11:47 16:33 


 


RBC    (3.80-5.40)  m/uL


 


Hgb    (11.4-16.0)  gm/dL


 


Hct    (34.0-46.0)  %


 


Neutrophils #    (1.3-7.7)  k/uL


 


Lymphocytes #    (1.0-4.8)  k/uL


 


PT    (9.0-12.0)  sec


 


INR    (<1.2)  


 


APTT    (22.0-30.0)  sec


 


ABG pH    (7.35-7.45)  


 


ABG pCO2  54 H   (35-45)  mmHg


 


ABG pO2    ()  mmHg


 


ABG HCO3  34 H   (21-25)  mmol/L


 


ABG Total CO2  36 H   (19-24)  mmol/L


 


ABG O2 Saturation  98.2 H   (94-97)  %


 


Sodium   123 L  (137-145)  mmol/L


 


Potassium    (3.5-5.1)  mmol/L


 


Chloride    ()  mmol/L


 


Carbon Dioxide    (22-30)  mmol/L


 


Creatinine    (0.52-1.04)  mg/dL


 


Glucose    (74-99)  mg/dL


 


POC Glucose (mg/dL)    (75-99)  mg/dL


 


Magnesium    (1.6-2.3)  mg/dL


 


Alkaline Phosphatase    ()  U/L


 


Total Protein    (6.3-8.2)  g/dL


 


Albumin    (3.5-5.0)  g/dL


 


Lipase    ()  U/L


 


Urine Glucose (UA)    (Negative)  








                      Microbiology - Last 24 Hours (Table)











 09/13/19 11:50 Sputum Culture - Preliminary





 Sputum 














Assessment and Plan


(1) Acute respiratory failure with hypoxia and hypercapnia


Narrative/Plan: 





* Patient sedated and ventilated with pulmonary following


* CT of the chest showing pulmonary interstitial and airspace infiltrates po

  ssible underlying pneumonia


* Continue vent management per pulmonary


* Continue with breathing treatments and antibiotics


Current Visit: Yes   Status: Acute   Code(s): J96.01 - ACUTE RESPIRATORY FAILURE

WITH HYPOXIA; J96.02 - ACUTE RESPIRATORY FAILURE WITH HYPERCAPNIA   SNOMED 

Code(s): 715324077


   





(2) Hyponatremia


Narrative/Plan: 





* Severe profound hyponatremia on presentation with a sodium of 111 now up to 

  117


* Previously on hypertonic saline, nephrology following closely


Current Visit: Yes   Status: Acute   Code(s): E87.1 - HYPO-OSMOLALITY AND 

HYPONATREMIA   SNOMED Code(s): 49174345


   





(3) Mediastinal lymphadenopathy


Narrative/Plan: 





* CT of the chest showing extensive bronchial and subcarinal adenopathy


* Suspected to be metastatic versus lung primary


Current Visit: Yes   Status: Acute   Code(s): R59.0 - LOCALIZED ENLARGED LYMPH 

NODES   SNOMED Code(s): 14902425


   





(4) History of laryngeal cancer


Narrative/Plan: 





* Most recent treatment in January 2019


Current Visit: Yes   Status: Chronic   Code(s): Z85.21 - PERSONAL HISTORY OF 

MALIGNANT NEOPLASM OF LARYNX   SNOMED Code(s): 986938855


   





(5) Hypokalemia


Current Visit: Yes   Status: Acute   Code(s): E87.6 - HYPOKALEMIA   SNOMED 

Code(s): 25062904


   





(6) Weakness


Narrative/Plan: 





* Secondary to profound hyponatremia


Current Visit: Yes   Status: Acute   Code(s): R53.1 - WEAKNESS   SNOMED Code(s):

30845129


   





(7) Severe protein-energy malnutrition


Current Visit: Yes   Status: Acute   Code(s): E43 - UNSPECIFIED SEVERE PROTEIN-

CALORIE MALNUTRITION   SNOMED Code(s): 829900052


   





(8) Metabolic encephalopathy


Narrative/Plan: 





* Secondary to hyponatremia


Current Visit: Yes   Status: Acute   Code(s): G93.41 - METABOLIC ENCEPHALOPATHY 

 SNOMED Code(s): 18777139


   


Plan: 





Disposition


* Patient and critical condition with extremely poor prognosis, attempt to touch

  base with family


* CODE STATUS previously discussed family wants to proceed with full code status


* Anticipated discharge: to be determined by course

## 2019-09-13 NOTE — P.CNPUL
History of Present Illness


Consult date: 09/13/19


Requesting physician: Radha Ward


Reason for consult: other (Mechanical ventilator and critical care management)


Chief complaint: Shortness of breath, weakness


History of present illness: 





This is a 53-year-old female patient who presented to the emergency room 

yesterday with complaints of shortness of breath and profound weakness.  She has

a history of laryngeal cancer and is status post chemoradiation in January of 

this year.  He follows with physicians in the [place] area.  She lives at home 

with her 18-year-old son.  Over the past several weeks she has been having 

difficulty in swallowing and had been only tolerating liquids.  She is severely 

cachectic and weak.  She was found to be hyponatremic with a sodium of 111.  

Abdominal series revealed a nonacute abdomen.  There is lateral patchy pulmonary

interstitial and alveolar infiltrates and a small right pleural effusion noted. 

CT angiogram revealed emphysema and extensive pulmonary interstitial and 

airspace infiltrates.  No evidence of pulmonary embolism.  There is extensive 

bronchial adenopathy.  She was admitted to the intensive care unit from the 

emergency room and was clearly an respiratory distress and stridorous him a 

positive orthopnea.  Oxygen 90% on 4 L nasal cannula initially.  Arterial blood 

gases revealed a pO2 of 79, pCO2 of 107 and a pH of 7.13 on 32% FiO2.  She was 

intubated and placed on mechanical ventilator.  Subsequent blood gases revealed 

a PaO2 of 66, pCO2 of 67 and a pH of 7.30.  Vent settings were assist control of

20, tidal volume 300, FiO2 80% and a PEEP of 5.  She did receive 2-1/2 L of 0.9 

normal saline.  She is currently on norepinephrine at 5.8 mcg/m, propofol at 50 

mcg/kg/m, D5W at 50 MLS per hour.  Current sodium 117.  She is seen in the 

intensive care unit with follow-up arterial blood gases up pO2 of 75, pCO2 55 

and a pH of 7.4-60% FiO2.  We'll continue to titrate that down.  White count 

8.6.  Hemoglobin 10.5.  Sodium 117.  Potassium 3.4.  Bicarb 32.  Creatinine 

0.16.  Morning chest x-ray reveals basilar atelectasis/pneumonia or tumor with 

pleural effusion right greater than left.  She is extremely cachectic.  BMI 

13.4.





Review of Systems


ROS unobtainable: due to endotracheal tube





Past Medical History


Past Medical History: Cancer


History of Any Multi-Drug Resistant Organisms: None Reported


Past Surgical History: Tubal Ligation


Past Psychological History: No Psychological Hx Reported


Smoking Status: Former smoker


Past Alcohol Use History: Occasional


Past Drug Use History: None Reported


Additional History: Family history unable to be obtained and the patient as she 

is intubated on mechanical ventilator, sedated.





Medications and Allergies


                                Home Medications











 Medication  Instructions  Recorded  Confirmed  Type


 


Ibuprofen [Advil] 200 mg PO Q8HR PRN 09/12/19 09/12/19 History


 


guaiFENesin [Mucinex] 600 mg PO BID PRN 09/12/19 09/12/19 History








                                    Allergies











Allergy/AdvReac Type Severity Reaction Status Date / Time


 


No Known Allergies Allergy   Verified 09/12/19 17:35














Physical Exam


Vitals: 


                                   Vital Signs











  Temp Pulse Resp BP Pulse Ox


 


 09/13/19 09:00   70  20   98


 


 09/13/19 08:45   70  20   99


 


 09/13/19 08:30   70  20   99


 


 09/13/19 08:15   71  20  81/56 


 


 09/13/19 08:01   68   


 


 09/13/19 08:00  97.9 F  66  20  81/56  98


 


 09/13/19 07:45   66  20  84/58  97


 


 09/13/19 07:43   64   


 


 09/13/19 07:30   65  20  79/56  97


 


 09/13/19 07:15   65  20  79/56  97


 


 09/13/19 07:00   65  20   95


 


 09/13/19 06:45   65  20   95


 


 09/13/19 06:30   66  20   94 L


 


 09/13/19 06:15   68  20   95


 


 09/13/19 06:00   69  20   98


 


 09/13/19 05:45   67  20   98


 


 09/13/19 05:30   66  20   98


 


 09/13/19 05:15   65  20   96


 


 09/13/19 05:00   66  20   92 L


 


 09/13/19 04:45   65  20   98


 


 09/13/19 04:30   66  20   97


 


 09/13/19 04:15   64  20   98


 


 09/13/19 04:00  98 F  69  20   98


 


 09/13/19 03:45   62  20   99


 


 09/13/19 03:30   63  20   100


 


 09/13/19 03:15   63  20   100


 


 09/13/19 03:00   62  20   99


 


 09/13/19 02:45   67  20   100


 


 09/13/19 02:30   64  20   99


 


 09/13/19 02:15   60  20   99


 


 09/13/19 02:00   58 L  20  99/69  97


 


 09/13/19 01:45   60  20   98


 


 09/13/19 01:30   62  20   98


 


 09/13/19 01:15   62  20   98


 


 09/13/19 01:00   63  20  90/65 


 


 09/13/19 00:45   67  20   100


 


 09/13/19 00:30   70  20   96


 


 09/13/19 00:15   82  20  73/60  90 L


 


 09/13/19 00:00  97 F L  86  20  51/30  95


 


 09/12/19 23:45   112 H  15  110/78  98


 


 09/12/19 23:30   101 H  21  110/78  92 L


 


 09/12/19 23:15   104 H  19  110/78  90 L


 


 09/12/19 23:01   107 H  23  110/78  90 L


 


 09/12/19 23:00   107 H  21  126/83  92 L


 


 09/12/19 22:00  97.9 F  113 H  23  113/73 


 


 09/12/19 21:00   115 H  24  119/84  97


 


 09/12/19 19:49   112 H  20  123/89  97


 


 09/12/19 17:57   107 H   


 


 09/12/19 17:41   106 H   


 


 09/12/19 17:02  98.8 F  115 H  17  121/91  79 L








                                Intake and Output











 09/12/19 09/13/19 09/13/19





 22:59 06:59 14:59


 


Intake Total 170 685.898 350


 


Output Total 700 550 510


 


Balance -530 135.898 -160


 


Intake:   


 


   485 350


 


    .9  150 


 


    Dextrose 5% in Water 1,  250 150





    000 ml @ 50 mls/hr IV .   





    Q20H [facility] Rx#:[id number]   


 


    Dextrose 5%-0.45% NaCl 1, 170 85 





    000 ml @ 85 mls/hr IV .   





    [id number] [facility][id number]   


 


    Magnesium Sulfate-D5w Pmx   200





    1 gm In Dextrose/Water 1   





    100ml.bag @ 100 mls/hr   





    IVPB Q1H [facility] Rx#:   





    [id number]   


 


  Intake, IV Titration  140.898 





  Amount   


 


    Norepinephrine 4 mg In  133.717 





    Sodium Chloride 0.9% 250   





    ml @ 0.05 MCG/KG/MIN 6.   





    481 mls/hr IV .Q24H [facility]   





    Rx#:[id number]   


 


    Propofol 1,000 mg In  7.181 





    Empty Bag 1 bag @ Titrate   





    IV .Q0M [facility] Rx#:   





    [id number]   


 


  Other  60 


 


Output:   


 


  Urine 700 550 510


 


Other:   


 


  Voiding Method Indwelling Catheter Indwelling Catheter 


 


  Weight 34.019 kg 38.8 kg 








                         ABP, PAP, CO, CI - Last 8 Hours











Arterial Blood Pressure        87/56


 


Arterial Blood Pressure        89/59


 


Arterial Blood Pressure        95/63


 


Arterial Blood Pressure        106/69


 


Arterial Blood Pressure        81/48


 


Arterial Blood Pressure        97/82


 


Arterial Blood Pressure        92/69


 


Arterial Blood Pressure        99/60


 


Arterial Blood Pressure        83/54


 


Arterial Blood Pressure        90/55


 


Arterial Blood Pressure        90/57


 


Arterial Blood Pressure        85/51


 


Arterial Blood Pressure        87/55


 


Arterial Blood Pressure        92/60


 


Arterial Blood Pressure        96/63


 


Arterial Blood Pressure        87/57


 


Arterial Blood Pressure        91/58


 


Arterial Blood Pressure        88/56


 


Arterial Blood Pressure        85/59


 


Arterial Blood Pressure        86/58


 


Arterial Blood Pressure        88/52


 


Arterial Blood Pressure        89/62


 


Arterial Blood Pressure        93/61


 


Arterial Blood Pressure        95/62


 


Arterial Blood Pressure        90/60


 


Arterial Blood Pressure        81/56


 


Arterial Blood Pressure        106/68


 


Arterial Blood Pressure        107/68


 


Arterial Blood Pressure        99/64


 


Arterial Blood Pressure        93/60

















GENERAL EXAM: Very frail, cachectic 53-year-old female patient intubated on the 

mechanical ventilator, sedated.


HEAD: Normocephalic.


EYES: Sluggish reaction of pupils, equal size.


NOSE: Clear with pink turbinates.


THROAT: Oral endotracheal and gastric tube secured in place.  Edentulous.  No 

erythema or exudates.


NECK: No masses, no JVD.


CHEST: Right Mediport subclavian in place.  No chest wall deformity.


LUNGS: Equal air entry with scattered rhonchi.


CVS: S1 and S2 normal with no audible murmur, regular rhythm.


ABDOMEN: No hepatosplenomegaly, normal bowel sounds, no guarding or rigidity.


SPINE: No scoliosis or deformity


SKIN: No rashes


CENTRAL NERVOUS SYSTEM: Sedated, tone is normal in all 4 extremities.


EXTREMITIES: There is no peripheral edema.  No clubbing, no cyanosis.  Per

ipheral pulses are intact.





Results





- Laboratory Findings


CBC and BMP: 


                                 09/13/19 04:55





                                 09/13/19 04:55


ABG











ABG pH  7.42  (7.35-7.45)   09/13/19  06:55    


 


ABG pCO2  55 mmHg (35-45)  H  09/13/19  06:55    


 


ABG pO2  75 mmHg ()  L  09/13/19  06:55    


 


ABG O2 Saturation  95.4 % (94-97)   09/13/19  06:55    





PT/INR, D-dimer











PT  13.0 sec (9.0-12.0)  H  09/12/19  17:18    


 


INR  1.3  (<1.2)  H  09/12/19  17:18    








Abnormal lab findings: 


                                  Abnormal Labs











  09/12/19 09/12/19 09/12/19





  17:18 17:18 17:18


 


RBC   


 


Hgb   


 


Hct   


 


Neutrophils #   9.1 H 


 


Lymphocytes #   0.0 L 


 


PT    13.0 H


 


INR    1.3 H


 


APTT    30.9 H


 


ABG pH   


 


ABG pCO2   


 


ABG pO2   


 


ABG HCO3   


 


ABG Total CO2   


 


ABG O2 Saturation   


 


Sodium  111 L*  


 


Potassium   


 


Chloride  65 L*  


 


Carbon Dioxide  34 H  


 


Creatinine  0.20 L  


 


Glucose  118 H  


 


POC Glucose (mg/dL)   


 


Magnesium  1.3 L  


 


Alkaline Phosphatase  132 H  


 


Total Protein   


 


Albumin   


 


Lipase  10 L  


 


Urine Glucose (UA)   














  09/12/19 09/12/19 09/12/19





  19:58 21:15 21:27


 


RBC   


 


Hgb   


 


Hct   


 


Neutrophils #   


 


Lymphocytes #   


 


PT   


 


INR   


 


APTT   


 


ABG pH   


 


ABG pCO2   


 


ABG pO2   


 


ABG HCO3   


 


ABG Total CO2   


 


ABG O2 Saturation   


 


Sodium  116 L*  


 


Potassium   


 


Chloride   


 


Carbon Dioxide   


 


Creatinine   


 


Glucose   


 


POC Glucose (mg/dL)    179 H


 


Magnesium   


 


Alkaline Phosphatase   


 


Total Protein   


 


Albumin   


 


Lipase   


 


Urine Glucose (UA)   Trace H 














  09/12/19 09/13/19 09/13/19





  23:15 00:20 01:12


 


RBC   


 


Hgb   


 


Hct   


 


Neutrophils #   


 


Lymphocytes #   


 


PT   


 


INR   


 


APTT   


 


ABG pH  7.13 L*  7.30 L 


 


ABG pCO2  107 H*  67 H 


 


ABG pO2  79 L  66 L 


 


ABG HCO3  35 H  33 H 


 


ABG Total CO2  39 H  35 H 


 


ABG O2 Saturation  90.3 L  91.1 L 


 


Sodium    118 L*


 


Potassium   


 


Chloride   


 


Carbon Dioxide   


 


Creatinine   


 


Glucose   


 


POC Glucose (mg/dL)   


 


Magnesium   


 


Alkaline Phosphatase   


 


Total Protein   


 


Albumin   


 


Lipase   


 


Urine Glucose (UA)   














  09/13/19 09/13/19 09/13/19





  04:55 04:55 04:57


 


RBC  3.28 L  


 


Hgb  10.5 L  


 


Hct  31.4 L  


 


Neutrophils #  8.1 H  


 


Lymphocytes #  0.1 L  


 


PT   


 


INR   


 


APTT   


 


ABG pH   


 


ABG pCO2   


 


ABG pO2   


 


ABG HCO3   


 


ABG Total CO2   


 


ABG O2 Saturation   


 


Sodium   117 L* 


 


Potassium   3.4 L 


 


Chloride   79 L 


 


Carbon Dioxide   32 H 


 


Creatinine   0.16 L 


 


Glucose   155 H 


 


POC Glucose (mg/dL)    169 H


 


Magnesium   1.5 L 


 


Alkaline Phosphatase   


 


Total Protein   5.2 L 


 


Albumin   2.6 L 


 


Lipase   <10 L 


 


Urine Glucose (UA)   














  09/13/19





  06:55


 


RBC 


 


Hgb 


 


Hct 


 


Neutrophils # 


 


Lymphocytes # 


 


PT 


 


INR 


 


APTT 


 


ABG pH 


 


ABG pCO2  55 H


 


ABG pO2  75 L


 


ABG HCO3  35 H


 


ABG Total CO2  37 H


 


ABG O2 Saturation 


 


Sodium 


 


Potassium 


 


Chloride 


 


Carbon Dioxide 


 


Creatinine 


 


Glucose 


 


POC Glucose (mg/dL) 


 


Magnesium 


 


Alkaline Phosphatase 


 


Total Protein 


 


Albumin 


 


Lipase 


 


Urine Glucose (UA) 














- Diagnostic Findings


Chest x-ray: image reviewed


CT scan - chest: image reviewed





Assessment and Plan


Assessment: 





Impression:





#1 Acute metabolic encephalopathy secondary to severe hyponatremia with 

presenting sodium 111.





#2 Acute hypoxic/hypercapnic respiratory failure due to extensive pulmonary 

interstitial and airspace infiltrates.  There is also extensive 

bronchial/subcarinal adenopathy noted suspect metastasis versus lung primary.  





#3 Severe hyponatremia with profound weakness.  Presenting sodium 111 current 

sodium 117.





#4 History of laryngeal cancer status post chemo/radiation.  Details are not 

available.  Last treatment in January 2019.





#5 Severe protein calorie malnutrition secondary to above.





#6 History of previous tobacco dependence.





Plan:





The patient was seen and evaluated by Dr. Howell.  ABGs, chest x-ray, CAT scan 

and labs all reviewed.  We'll continue current vent settings are assist-control 

20, tidal volume 300, decrease FiO2 to 50% and PEEP of 5.  Repeat blood gases in

 an hour.  Will add Zosyn and Levaquin.  Consult nutrition for tube feed 

recommendations.  Add Protonix.  Give an additional 1 L of D5W fluid bolus.  Her

 overall prognosis is quite guarded.  There is noted bronchial adenopathy suspe

ct metastasis versus lung primary.  The patient is too critical at this point to

 plan further investigation.  We will continue to follow and make further 

recommendations based on her clinical status. 





I, the cosigning physician, performed a history & physical examination of the pa

elvisnt. Lungs sounds with bilateral scattered rhonchi.  Maintaining good O2 

saturations in the 90s on 50% FiO2 per mechanical ventilator.  I discussed the 

assessment and plan of care with my nurse practitioner, Alie Delarosa. I attest to 

the above consultation as dictated by her.


Time with Patient: Greater than 30

## 2019-09-13 NOTE — P.HPADDEND
H&P Addendum


H&P Addendum Date: 19





Advanced Care Planning





Active diagnoses:


acute metabolic encephalopathy secondary to hyponatremia


Acute hypoxic and hypercapnic respiratory failure


History of laryngeal cancer








Background: The patient was admitted for treatment of acute hyponatremia with 

metabolic encephalopathy. Confirmation and clarification of wishes upon 

admission.





Discussion:


Person(s) present and participating in discussion: The patient, myself, and 

patient's son





Summary: of note the patient has very poor insight of her medical condition, she

believes that she had laryngeal cancer and was treated and currently in 

remission since 2019 however since then she's continued to lose weight 

due to poor nutritional status she had to pull all her teeth for chemoradiation 

since then she was unable to eat properly she's been continuously losing weight 

and her overall health was deteriorating over the past month or so she was 

brought in today by her son due to increased lethargy and difficulty breathing. 

In the ED she was found to have acute hypoxia and hypercapnia with hyponatremia 

and acute metabolic encephalopathy.  CAT scan of the chest showed diffuse 

infiltrates and consolidation along with bilateral bronchial lymphadenopathy 

this is to me concerning for primary cancer of the lung versus metastasis from a

laryngeal cancer.  After explaining all these findings to the patient and her 

son they decided to pursue full medical measures hoping and a full recovery and 

return to her normal condition.  She is hoping for full recovery and be fully 

functional.  In case of cardiopulmonary arrest she wanted to pursue CPR and 

resuscitation and vent support if needed . she is also willing to be placed for 

a short period of time at a subacute rehab to regain some strength before going 

back home





Time spent:


Total time spent face to face in education and discussion directly related to 

advanced care plannin minutes

## 2019-09-13 NOTE — IR
EXAMINATION TYPE: IR cvc insert >=5 years

 

DATE OF EXAM: 9/13/2019

 

COMPARISON: NONE

 

HISTORY: Carcinoma, needs long-term intravenous access for therapy, total parenteral nutrition

 

FINDINGS: Maximal barrier technique was utilized.  The skin overlying the left basilic vein was local
ized with ultrasound and noted to be compressible and patent by ultrasound.  An ultrasound image was 
obtained and submitted on patient's chart. Sterile technique utilized with the ultrasound machine. Th
e skin overlying was prepped and draped and Lidocaine used for local anesthesia.  A skin nick was mad
e with a scalpel.  Access was gained to the vein under direct ultrasound guidance with a 21-gauge nee
dle and a 0.018 inch wire was advanced.  Access site was dilated with a peel-away sheath and the cath
eter tailored to length.  Catheter advanced centrally and a post procedure chest x-ray verified place
ment.  Catheter was fixed to the skin  and a sterile dressing placed.  Hemostasis achieved and the ca
theter was aspirated and flushed with sterile saline.  The patient remained in stable condition.

 

IMPRESSION: STATUS POST ULTRASOUND GUIDED PICC LINE PLACEMENT, READY FOR USE.  THIS PROCEDURE WAS PER
FORMED BY THE UNDERSIGNED.

## 2019-09-13 NOTE — XR
EXAMINATION TYPE: XR chest 1V confirm line [facility]

 

DATE OF EXAM: 9/13/2019

 

COMPARISON: Previous chest x-ray on same date earlier time

 

HISTORY: Status post PICC line placement on repositioning

 

TECHNIQUE: Single frontal view of the chest is obtained.

 

FINDINGS:  Left-sided PICC line has been repositioned in the distal tip is at the level of the cavoat
rial junction. Retrocardiac density persists. There is no evident pneumothorax. Heart remains small. 
Port-A-Cath is stable. 

 

IMPRESSION:  Interval reposition of PICC line.

## 2019-09-13 NOTE — XR
EXAMINATION TYPE: XR chest 1V portable

 

DATE OF EXAM: 9/13/2019

 

COMPARISON: Prior chest x-ray dated 9/13/2019 and CT 9/12/2019

 

HISTORY: Tube placement, intubated

 

TECHNIQUE: Single frontal view of the chest is obtained.

 

FINDINGS:  Endotracheal tube, NG tube, right-sided Port-A-Cath and overlying cardiac leads are again 
noted. Distal tip of the NG tube is within the left upper quadrant, endotracheal tube overlying the t
deana air column. Port-A-Cath is stable. Bibasilar increased density is again noted, heart size is 
stable. No evident pneumothorax. Ill-defined increased attenuation present in the left upper lobe. Bi
apical pleural thickening. Prominent lung volumes compatible with underlying emphysema.

 

IMPRESSION:  Findings are similar to prior exam. There may be basilar atelectasis, pneumonia, or tumo
r with pleural effusion right greater than left.

## 2019-09-13 NOTE — P.HPIM
History of Present Illness


H&P Date: 09/12/19


Chief Complaint: generalized fatigue and lethargy





3-year-old female with history of laryngeal cancer status post chemo and 

radiation therapy





patient is hard of hearing and difficult to understand her speech did history of

laryngeal cancer patient's son was translating for the patient at her bedside.





Patient reports that she has history of laryngeal cancer for which she received 

chemoradiation therapy back and generally 2019 she believes that she is in 

remission at this time.  Since then she's been hard of hearing with difficulties

with speech she lost all her teeth and was unable to eat properly.  She 

continued to lose a lot of weight since then due to difficulty eating regular 

diet.  Patient lives with her son and they take care of each other.  Seems like 

patient has poor insight about her medical status.  The son believes that they 

were doing fine.  Patient progressively over the past few months had decreased 

by mouth intake she was relying only on liquid and soft diet, for which she was 

losing a lot of weight.





Per the son over the past few days he noticed that his mom was getting 

progressively weak and sleeping more than usual todayalong with having some 

difficulty with breathing, he decided to take her to the urgent care for 

evaluation from where she was sent to the hospital to the ER.


patient reports at home patient walks on her own doesn't require any devices for

ambulation.  His main concern is the poor nutritional status since she pulled he

r teeth before chemo and radiation therapy she has not been able to eat properly

she got some dentures but they were all fitting.





Otherwise patient and her son denies any falling denies any chronic pain denies 

taking any medications at home denies any diarrhea or bleeding.  To their 

knowledge she is cancer free


Patient has quit smoking 2 months ago





In the ED she was found to be hypoxic and hyponatremic





CTA of the chest and abdomen pelvis was performed showed extensive bilateral 

lung infiltrates and consolidations lungs with bilateral bronchial lymph nodes 

enlargement and subcarinal lymph nodes enlargement.





Review of Systems








when asked patient reports that she is fine nothing wrong with her








Pertinent positives as noted in HPI. All other systems were reviewed and are 

negative





Past Medical History


Past Medical History: Cancer


History of Any Multi-Drug Resistant Organisms: None Reported


Past Surgical History: Tubal Ligation


Past Psychological History: No Psychological Hx Reported


Smoking Status: Former smoker


Past Alcohol Use History: Occasional


Past Drug Use History: None Reported





Medications and Allergies


                                Home Medications











 Medication  Instructions  Recorded  Confirmed  Type


 


Ibuprofen [Advil] 200 mg PO Q8HR PRN 09/12/19 09/12/19 History


 


guaiFENesin [Mucinex] 600 mg PO BID PRN 09/12/19 09/12/19 History








                                    Allergies











Allergy/AdvReac Type Severity Reaction Status Date / Time


 


No Known Allergies Allergy   Verified 09/12/19 17:35














Physical Exam


Vitals: 


                                   Vital Signs











  Temp Pulse Resp BP Pulse Ox


 


 09/13/19 01:15   62  20   98


 


 09/13/19 01:00   63  20  90/65 


 


 09/13/19 00:45   67  20   100


 


 09/13/19 00:30   70  20   96


 


 09/13/19 00:15   82  20  73/60  90 L


 


 09/13/19 00:00  97 F L  86  20  51/30 


 


 09/12/19 23:45   112 H  15  110/78  98


 


 09/12/19 23:30   101 H  21  110/78  92 L


 


 09/12/19 23:15   104 H  19  110/78  90 L


 


 09/12/19 23:01   107 H  23  110/78  90 L


 


 09/12/19 23:00   107 H  21  126/83  92 L


 


 09/12/19 22:00  97.9 F  113 H  23  113/73 


 


 09/12/19 21:00   115 H  24  119/84  97


 


 09/12/19 19:49   112 H  20  123/89  97


 


 09/12/19 17:57   107 H   


 


 09/12/19 17:41   106 H   


 


 09/12/19 17:02  98.8 F  115 H  17  121/91  79 L








                                Intake and Output











 09/12/19 09/12/19 09/13/19





 14:59 22:59 06:59


 


Intake Total  170 235


 


Output Total  700 350


 


Balance  -530 -115


 


Intake:   


 


  IV  170 235


 


    .9   150


 


    Dextrose 5%-0.45% NaCl 1,  170 85





    000 ml @ 85 mls/hr IV .   





    [id number] [facility] Rx#:[id number]   


 


Output:   


 


  Urine  700 350


 


Other:   


 


  Voiding Method  Indwelling Catheter Indwelling Catheter


 


  Weight  34.019 kg 








                         ABP, PAP, CO, CI - Last 8 Hours











Arterial Blood Pressure        91/54


 


Arterial Blood Pressure        101/63


 


Arterial Blood Pressure        89/57


 


Arterial Blood Pressure        87/49


 


Arterial Blood Pressure        79/44


 


Arterial Blood Pressure        29/26




















this is initial exam upon presentation in the ED,patient initial evaluation was 

around 1930





Constitutional:          No acute distress, Patient is sleeping easily arousable

 follows command difficult to understand her speech due to history of laryngeal 

cancer son at bedside speaking for the patient and explaining her wishes as he 

is able to understand her, patient is cachectic, patient is also hard of hearing


Eyes:      Anicteric sclerae, moist conjunctiva,  


         Pupils equal round reactive to light





ENMT:      NC/AT


         Oropharynx clear, very dry mucous membranes, no erythema,or exudates





Neck:      Supple, FROM, no masses, or JVD


         No carotid bruits


         No thyromegaly





Lungs:      good breath sounds bilaterally no wheezing or rhonchi or rales


         Clear to percussion


         Normal respiratory effort, no accessory muscle use 





Cardiovascular:      Heart regular in rate and rhythm, 


         No murmurs, gallops, or rubs


         No peripheral edema





Abdominal:       Soft


         Nontender, no guarding, rebound or rigidity


         Abdomen moving with respiration


         Normoactive bowel sounds


         No hepatomegaly, No splenomegaly


         No palpable mass 


         No abdominal wall hernia noted 





Skin:      Normal temperature, tone, texture, turgor


         No induration


         No subcutaneous nodules 


         No rash, lesions


         No ulcers





Extremities:      No digital cyanosis 


         No clubbing


         Pedal pulses intact and symmetrical


         Radial pulses intact and symmetrical 


         No calf tenderness 





Psychiatric:      patient is sleepy but easily arousable follows command orient

ed to person and place not oriented to time


         


            


      


Neuro      Muscles Strength -4/5 in all 4 extremities 


         Sensation to light touch grossly present throughout


         Cranial nerves II-XII grossly intact, however patient is hard of 

hearing


         No focal sensory deficits


Lymphatics:       no palpable cervical or supraclavicular , or inguinal lymph 

nodes  





Results


CBC & Chem 7: 


                                 09/12/19 17:18





                                 09/13/19 01:12


Labs: 


                  Abnormal Lab Results - Last 24 Hours (Table)











  09/12/19 09/12/19 09/12/19 Range/Units





  17:18 17:18 17:18 


 


Neutrophils #   9.1 H   (1.3-7.7)  k/uL


 


Lymphocytes #   0.0 L   (1.0-4.8)  k/uL


 


PT    13.0 H  (9.0-12.0)  sec


 


INR    1.3 H  (<1.2)  


 


APTT    30.9 H  (22.0-30.0)  sec


 


ABG pH     (7.35-7.45)  


 


ABG pCO2     (35-45)  mmHg


 


ABG pO2     ()  mmHg


 


ABG HCO3     (21-25)  mmol/L


 


ABG Total CO2     (19-24)  mmol/L


 


ABG O2 Saturation     (94-97)  %


 


Sodium  111 L*    (137-145)  mmol/L


 


Chloride  65 L*    ()  mmol/L


 


Carbon Dioxide  34 H    (22-30)  mmol/L


 


Creatinine  0.20 L    (0.52-1.04)  mg/dL


 


Glucose  118 H    (74-99)  mg/dL


 


POC Glucose (mg/dL)     (75-99)  mg/dL


 


Magnesium  1.3 L    (1.6-2.3)  mg/dL


 


Alkaline Phosphatase  132 H    ()  U/L


 


Lipase  10 L    ()  U/L


 


Urine Glucose (UA)     (Negative)  














  09/12/19 09/12/19 09/12/19 Range/Units





  19:58 21:15 21:27 


 


Neutrophils #     (1.3-7.7)  k/uL


 


Lymphocytes #     (1.0-4.8)  k/uL


 


PT     (9.0-12.0)  sec


 


INR     (<1.2)  


 


APTT     (22.0-30.0)  sec


 


ABG pH     (7.35-7.45)  


 


ABG pCO2     (35-45)  mmHg


 


ABG pO2     ()  mmHg


 


ABG HCO3     (21-25)  mmol/L


 


ABG Total CO2     (19-24)  mmol/L


 


ABG O2 Saturation     (94-97)  %


 


Sodium  116 L*    (137-145)  mmol/L


 


Chloride     ()  mmol/L


 


Carbon Dioxide     (22-30)  mmol/L


 


Creatinine     (0.52-1.04)  mg/dL


 


Glucose     (74-99)  mg/dL


 


POC Glucose (mg/dL)    179 H  (75-99)  mg/dL


 


Magnesium     (1.6-2.3)  mg/dL


 


Alkaline Phosphatase     ()  U/L


 


Lipase     ()  U/L


 


Urine Glucose (UA)   Trace H   (Negative)  














  09/12/19 09/13/19 09/13/19 Range/Units





  23:15 00:20 01:12 


 


Neutrophils #     (1.3-7.7)  k/uL


 


Lymphocytes #     (1.0-4.8)  k/uL


 


PT     (9.0-12.0)  sec


 


INR     (<1.2)  


 


APTT     (22.0-30.0)  sec


 


ABG pH  7.13 L*  7.30 L   (7.35-7.45)  


 


ABG pCO2  107 H*  67 H   (35-45)  mmHg


 


ABG pO2  79 L  66 L   ()  mmHg


 


ABG HCO3  35 H  33 H   (21-25)  mmol/L


 


ABG Total CO2  39 H  35 H   (19-24)  mmol/L


 


ABG O2 Saturation  90.3 L  91.1 L   (94-97)  %


 


Sodium    118 L*  (137-145)  mmol/L


 


Chloride     ()  mmol/L


 


Carbon Dioxide     (22-30)  mmol/L


 


Creatinine     (0.52-1.04)  mg/dL


 


Glucose     (74-99)  mg/dL


 


POC Glucose (mg/dL)     (75-99)  mg/dL


 


Magnesium     (1.6-2.3)  mg/dL


 


Alkaline Phosphatase     ()  U/L


 


Lipase     ()  U/L


 


Urine Glucose (UA)     (Negative)  














Assessment and Plan


Assessment: 





53-year-old female with history of laryngeal cancer status post chemo and 

radiation therapy 8 months ago.  Patient admitted as an inpatient with 

anticipated length of stay more than 2 midnights for acute hyponatremia with 

lethargy and generalized fatigue, severe protein calorie malnutrition.


her initial sodium was 111 upon presentation, per ICU recommendations Dr. Del Toro

 patient was given 3% saline at rate of 20 mL/h then was switched to normal 

saline.  Patient corrected by 7 mEq over 6 hours for which nephrology 

recommended switching to D5


patient seemed to become lethargic, blood gases checked showed hypercapnia for 

which patient was intubated for vent support.





CT angiogram of the chest abdomen pelvis findings are concerning for possibility

 of lung cancer whether its primary or metastatic from history of laryngeal 

cancer.  Patient and her son to their knowledge that she is cancer free since 

the treatment that she received in January 2019 but I believe that the patient 

and her son have poor insight about the medical condition of his mother which 

been deteriorating over the past few months


Plan: 





acute metabolic encephalopathy





Acute hypoxic and hypercapnic respiratory failure





acute symptomatic hyponatremia





Severe protein calorie malnutrition





History of laryngeal cancer status post chemo and radiation 8 months ago





hypomagnesemia 








initially patient was started on supportive care, and 3% saline


Howeverpatient deteriorated further became more lethargic ABG showed hypercapnia

 for which patient was intubated currently on vent support


Later on due to sedation she became hypotensive and was started on levo fed for 

cardiovascular support


Nephrology suggested to continue overnight with D5 due to rapid correction of 

her sodium by 7 mEq over 6 hours


follow-up electrolytes closely and replace as needed


Currently nephrology managing her sodium


Continue with vent support


Follow-up cultures


Patient denies any other medical history she doesn't take any medications at 

home


PT/OT evaluationonce extubated


continue ICU management





DVT prophylaxis lovenox


Prophylaxis PPI





 


Surrogate decision-maker: he should've son


CODE STATUS:full code


Discussed with: Patient, ER, RN


Anticipated length of stay  more than 2 midnights


Anticipated discharge place: pending clinical course


A total of 60 minutes was spent on the care of this complex patient more than 

50% of the time was spent in counseling and care coordination.

## 2019-09-13 NOTE — XR
EXAMINATION TYPE: XR chest 1V portable

 

DATE OF EXAM: 9/13/2019

 

COMPARISON: 9/13/19

 

HISTORY: PICC placement

 

TECHNIQUE: Single frontal view of the chest is obtained.

 

FINDINGS:  There is a malpositioned left PICC crossing the midline and extending into the brachioceph
alic vein. Right Mediport is deeply positioned in the right atrium although similar to the prior. Lef
t basilar and retrocardiac airspace disease are similar. Pulmonary hyperinflation of underlying emphy
sema. Chronic parenchymal change. Endotracheal and enteric tubes appear satisfactory. Trace pleural e
ffusions. Generalized osseous demineralization.

 

IMPRESSION:  Malpositioned left PICC. Repositioning is recommended. Otherwise stable exam from 9/13/2
019 with retrocardiac airspace disease and trace pleural effusions.

## 2019-09-14 LAB
ANION GAP SERPL CALC-SCNC: 4 MMOL/L
BASOPHILS # BLD AUTO: 0 K/UL (ref 0–0.2)
BASOPHILS NFR BLD AUTO: 0 %
BUN SERPL-SCNC: 7 MG/DL (ref 7–17)
CALCIUM SPEC-MCNC: 8.4 MG/DL (ref 8.4–10.2)
CHLORIDE SERPL-SCNC: 87 MMOL/L (ref 98–107)
CO2 BLDA-SCNC: 38 MMOL/L (ref 19–24)
CO2 BLDA-SCNC: 38 MMOL/L (ref 19–24)
CO2 SERPL-SCNC: 35 MMOL/L (ref 22–30)
EOSINOPHIL # BLD AUTO: 0 K/UL (ref 0–0.7)
EOSINOPHIL NFR BLD AUTO: 0 %
ERYTHROCYTE [DISTWIDTH] IN BLOOD BY AUTOMATED COUNT: 3.13 M/UL (ref 3.8–5.4)
ERYTHROCYTE [DISTWIDTH] IN BLOOD: 12.9 % (ref 11.5–15.5)
GLUCOSE BLD-MCNC: 121 MG/DL (ref 75–99)
GLUCOSE BLD-MCNC: 123 MG/DL (ref 75–99)
GLUCOSE BLD-MCNC: 138 MG/DL (ref 75–99)
GLUCOSE BLD-MCNC: 90 MG/DL (ref 75–99)
GLUCOSE SERPL-MCNC: 126 MG/DL (ref 74–99)
HCO3 BLDA-SCNC: 36 MMOL/L (ref 21–25)
HCO3 BLDA-SCNC: 36 MMOL/L (ref 21–25)
HCT VFR BLD AUTO: 29.7 % (ref 34–46)
HGB BLD-MCNC: 9.9 GM/DL (ref 11.4–16)
LYMPHOCYTES # SPEC AUTO: 0.1 K/UL (ref 1–4.8)
LYMPHOCYTES NFR SPEC AUTO: 0 %
MCH RBC QN AUTO: 31.6 PG (ref 25–35)
MCHC RBC AUTO-ENTMCNC: 33.3 G/DL (ref 31–37)
MCV RBC AUTO: 95.1 FL (ref 80–100)
MONOCYTES # BLD AUTO: 0.5 K/UL (ref 0–1)
MONOCYTES NFR BLD AUTO: 4 %
NEUTROPHILS # BLD AUTO: 11.6 K/UL (ref 1.3–7.7)
NEUTROPHILS NFR BLD AUTO: 95 %
PCO2 BLDA: 47 MMHG (ref 35–45)
PCO2 BLDA: 52 MMHG (ref 35–45)
PH BLDA: 7.46 [PH] (ref 7.35–7.45)
PH BLDA: 7.5 [PH] (ref 7.35–7.45)
PLATELET # BLD AUTO: 426 K/UL (ref 150–450)
PO2 BLDA: 82 MMHG (ref 83–108)
PO2 BLDA: 98 MMHG (ref 83–108)
POTASSIUM SERPL-SCNC: 3.8 MMOL/L (ref 3.5–5.1)
SODIUM SERPL-SCNC: 126 MMOL/L (ref 137–145)
WBC # BLD AUTO: 12.3 K/UL (ref 3.8–10.6)

## 2019-09-14 RX ADMIN — HEPARIN SODIUM SCH UNIT: 5000 INJECTION, SOLUTION INTRAVENOUS; SUBCUTANEOUS at 08:41

## 2019-09-14 RX ADMIN — IPRATROPIUM BROMIDE AND ALBUTEROL SULFATE PRN ML: .5; 3 SOLUTION RESPIRATORY (INHALATION) at 15:10

## 2019-09-14 RX ADMIN — IPRATROPIUM BROMIDE AND ALBUTEROL SULFATE PRN ML: .5; 3 SOLUTION RESPIRATORY (INHALATION) at 23:41

## 2019-09-14 RX ADMIN — CHLORHEXIDINE GLUCONATE SCH ML: 1.2 RINSE ORAL at 08:41

## 2019-09-14 RX ADMIN — NOREPINEPHRINE BITARTRATE SCH MLS/HR: 1 INJECTION, SOLUTION, CONCENTRATE INTRAVENOUS at 06:31

## 2019-09-14 RX ADMIN — PANTOPRAZOLE SODIUM SCH MG: 40 INJECTION, POWDER, FOR SOLUTION INTRAVENOUS at 08:41

## 2019-09-14 RX ADMIN — PIPERACILLIN AND TAZOBACTAM SCH MLS/HR: 3; .375 INJECTION, POWDER, FOR SOLUTION INTRAVENOUS at 21:47

## 2019-09-14 RX ADMIN — HEPARIN SODIUM SCH: 5000 INJECTION, SOLUTION INTRAVENOUS; SUBCUTANEOUS at 19:18

## 2019-09-14 RX ADMIN — LEVOFLOXACIN SCH MLS/HR: 5 INJECTION, SOLUTION INTRAVENOUS at 09:21

## 2019-09-14 RX ADMIN — MAGNESIUM SULFATE IN DEXTROSE SCH MLS/HR: 10 INJECTION, SOLUTION INTRAVENOUS at 13:47

## 2019-09-14 RX ADMIN — CEFAZOLIN SCH MLS/HR: 330 INJECTION, POWDER, FOR SOLUTION INTRAMUSCULAR; INTRAVENOUS at 10:41

## 2019-09-14 RX ADMIN — HEPARIN SODIUM SCH UNIT: 5000 INJECTION, SOLUTION INTRAVENOUS; SUBCUTANEOUS at 23:59

## 2019-09-14 RX ADMIN — PIPERACILLIN AND TAZOBACTAM SCH MLS/HR: 3; .375 INJECTION, POWDER, FOR SOLUTION INTRAVENOUS at 08:42

## 2019-09-14 RX ADMIN — MAGNESIUM SULFATE IN DEXTROSE SCH MLS/HR: 10 INJECTION, SOLUTION INTRAVENOUS at 12:14

## 2019-09-14 RX ADMIN — IPRATROPIUM BROMIDE AND ALBUTEROL SULFATE PRN ML: .5; 3 SOLUTION RESPIRATORY (INHALATION) at 20:44

## 2019-09-14 RX ADMIN — MAGNESIUM SULFATE IN DEXTROSE SCH MLS/HR: 10 INJECTION, SOLUTION INTRAVENOUS at 09:43

## 2019-09-14 RX ADMIN — MAGNESIUM SULFATE IN DEXTROSE SCH MLS/HR: 10 INJECTION, SOLUTION INTRAVENOUS at 10:39

## 2019-09-14 RX ADMIN — PROPOFOL SCH MLS/HR: 10 INJECTION, EMULSION INTRAVENOUS at 08:41

## 2019-09-14 RX ADMIN — IPRATROPIUM BROMIDE AND ALBUTEROL SULFATE PRN ML: .5; 3 SOLUTION RESPIRATORY (INHALATION) at 07:07

## 2019-09-14 NOTE — XR
EXAMINATION TYPE: XR chest 1V portable

 

DATE OF EXAM: 9/14/2019

 

HISTORY: Tube placement.

 

REFERENCE: Previous study dated 9/13/2019.

 

FINDINGS: The patient is ET tube and NG tube remain in place, unchanged in appearance. There is a Med
iport in place via a left internal jugular approach. Its tip is in the right atrium. A left basilic P
ICC line is in place. Its tip is in the superior vena cava.

 

The lungs are overinflated. There is increased opacity in the left upper lobe as well as in the left 
lung base. Suspect a small left effusion. The heart is not enlarged.

 

IMPRESSION: 

 

LEFT-SIDED AIRSPACE DISEASE WITH A SMALL LEFT-SIDED EFFUSION.

## 2019-09-14 NOTE — P.PN
Subjective


Progress Note Date: 09/14/19


Principal diagnosis: 





The patient is a 53-year-old  female with a history of laryngeal cancer

status post chemo and radiation therapy that was admitted for acute respiratory 

failure with hypoxia, acute metabolic encephalopathy and symptomatic 

hyponatremia after presenting with progressive generalized weakness and 

difficulty breathing.  A workup with CT of her chest abdomen pelvis showed 

extensive bilateral lung infiltrates and consolidation with bilateral bronchial 

lymph node enlargement and subcarinal lymph node enlargement, initial Arterial 

blood gases revealed a pO2 of 79, pCO2 of 107 and a pH of 7.13 on 32% FiO2 and 

the patient was intubated and placed on mechanical ventilator.  She was started 

on empiric IV antibiotics with Levaquin and Zosyn, serum sodium level was 

profoundly low at 111 the patient was given hypertonic 3% saline at 20 mL an 

hour and subsequent switched to normal saline with nephrology Dr. Hutton 

consulted to manage the patient's hyponatremia.  The patient was sedated on 

propofol and started on levophed drip to keep map greater than 65.  





Patient seen and examined in follow-up today, intubated on mechanical 

ventilatory sedated on propofol but is arousing as the propofol is being weaned.

 The Levophed is going at approximately 2 mcg/m, propofol at 30 mcg/kg/m. 

patient having adequate urine output.  Serum sodium is 126 today WBCs 12.3 

hemoglobin 9.9





Objective





- Vital Signs


Vital signs: 


                                   Vital Signs











Temp  98.4 F   09/14/19 12:00


 


Pulse  97   09/14/19 12:00


 


Resp  29 H  09/14/19 12:00


 


BP  96/64   09/14/19 12:00


 


Pulse Ox  98   09/14/19 12:00








                                 Intake & Output











 09/13/19 09/14/19 09/14/19





 18:59 06:59 18:59


 


Intake Total 2069.530 439.257 4692.702


 


Output Total 9374 555 8134


 


Balance 345.530 396.845 70.702


 


Weight 38.8 kg 40 kg 


 


Intake:   


 


  IV 1080 360 240


 


    .9 kvo 230 360 240


 


    Dextrose 5% in Water 1, 650  





    000 ml @ 50 mls/hr IV .   





    Q20H [facility] Rx#:[id number]   


 


    Magnesium Sulfate-D5w Pmx 200  





    1 gm In Dextrose/Water 1   





    100ml.bag @ 100 mls/hr   





    IVPB Q1H [facility] Rx#:   





    [id number]   


 


  Intake, IV Titration 979.530 140.845 855.702





  Amount   


 


    Levofloxacin 500Mg-D5w 100  200





    Pmx 500 mg In Dextrose/   





    Water 1 100ml.bag @ 100   





    mls/hr IVPB Q24H [facility] Rx#:   





    [id number]   


 


    Magnesium Sulfate-D5w Pmx   200





    1 gm In Dextrose/Water 1   





    100ml.bag @ 66.667 mls/   





    hr IVPB Q90M [facility] Rx#:   





    [id number]   


 


    Norepinephrine 4 mg In 186.711 140.845 25.382





    Sodium Chloride 0.9% 250   





    ml @ 0.05 MCG/KG/MIN 6.   





    481 mls/hr IV .Q24H [facility]   





    Rx#:[id number]   


 


    Piperacillin-Tazobactam 3 100  200





    .375 gm In Sodium   





    Chloride 0.9% 100 ml @ 25   





    mls/hr IVPB Q12HR [facility] Rx   





    #:[id number]   


 


    Propofol 1,000 mg In 92.819  110.32





    Empty Bag 1 bag @ Titrate   





    IV .Q0M [facility] Rx#:   





    [id number]   


 


    Sodium Chloride 0.9% 1,   120





    000 ml @ 60 mls/hr IV .   





    [id number] [facility] Rx#:[id number]   


 


    Sodium Chloride 0.9% 500 500  





    ml 500 ml @ 999 mls/hr IV   





    .Q31M [facility] Rx#:[id number]   


 


  Tube Feeding 10 160 50


 


  Other  90 


 


Output:   


 


  Urine 1965 980 5691


 


Other:   


 


  Voiding Method Indwelling Catheter Indwelling Catheter Indwelling Catheter








                       ABP, PAP, CO, CI - Last Documented











Arterial Blood Pressure        193/188

















- Exam





Constitutional: No acute distress, sedated and intubated, cachectic appearance





Eyes: Anicteric sclerae, moist conjunctiva, no lid-lag, PERRLA





ENMT: Bilateral temporal wasting oral endotracheal and G-tube secured in place 

NC/AT,Oropharynx clear, no erythema, exudates





Neck:Supple, FROM, no masses, or JVD, No carotid bruits; No thyromegaly





Lungs: Right Mediport subclavian in place no chest wall deformity





Cardiovascular: Heart regular in rate and rhythm,  No murmurs, gallops, or rubs 

no peripheral edema





Abdominal: Soft Nontender, nom distended, no guarding, no rebound or  rigidity, 

Normoactive bowel sounds No hepatomegaly, No splenomegaly,  No palpable mass No 

abdominal wall hernia noted 





Skin: Normal temperature, tone, texture, turgor, No induration No subcutaneous 

nodules, No rash, lesions, No ulcers





Extremities:No digital cyanosis No clubbing, Pedal pulses intact and  

symmetrical Radial pulses intact and symmetrical Normal gait and station, No 

calf tenderness   


      


Neuro: Sedated on propofol unable to fully assess








- Labs


CBC & Chem 7: 


                                 09/14/19 05:10





                                 09/14/19 05:10


Labs: 


                  Abnormal Lab Results - Last 24 Hours (Table)











  09/13/19 09/13/19 09/14/19 Range/Units





  16:33 19:50 00:08 


 


WBC     (3.8-10.6)  k/uL


 


RBC     (3.80-5.40)  m/uL


 


Hgb     (11.4-16.0)  gm/dL


 


Hct     (34.0-46.0)  %


 


Neutrophils #     (1.3-7.7)  k/uL


 


Lymphocytes #     (1.0-4.8)  k/uL


 


ABG pH     (7.35-7.45)  


 


ABG pCO2     (35-45)  mmHg


 


ABG pO2     ()  mmHg


 


ABG HCO3     (21-25)  mmol/L


 


ABG Total CO2     (19-24)  mmol/L


 


ABG O2 Saturation     (94-97)  %


 


Sodium  123 L  124 L   (137-145)  mmol/L


 


Chloride     ()  mmol/L


 


Carbon Dioxide     (22-30)  mmol/L


 


Creatinine     (0.52-1.04)  mg/dL


 


Glucose     (74-99)  mg/dL


 


POC Glucose (mg/dL)    138 H  (75-99)  mg/dL














  09/14/19 09/14/19 09/14/19 Range/Units





  05:10 05:10 06:06 


 


WBC  12.3 H    (3.8-10.6)  k/uL


 


RBC  3.13 L    (3.80-5.40)  m/uL


 


Hgb  9.9 L    (11.4-16.0)  gm/dL


 


Hct  29.7 L    (34.0-46.0)  %


 


Neutrophils #  11.6 H    (1.3-7.7)  k/uL


 


Lymphocytes #  0.1 L    (1.0-4.8)  k/uL


 


ABG pH     (7.35-7.45)  


 


ABG pCO2     (35-45)  mmHg


 


ABG pO2     ()  mmHg


 


ABG HCO3     (21-25)  mmol/L


 


ABG Total CO2     (19-24)  mmol/L


 


ABG O2 Saturation     (94-97)  %


 


Sodium   126 L   (137-145)  mmol/L


 


Chloride   87 L   ()  mmol/L


 


Carbon Dioxide   35 H   (22-30)  mmol/L


 


Creatinine   0.17 L   (0.52-1.04)  mg/dL


 


Glucose   126 H   (74-99)  mg/dL


 


POC Glucose (mg/dL)    121 H  (75-99)  mg/dL














  09/14/19 09/14/19 Range/Units





  08:04 11:52 


 


WBC    (3.8-10.6)  k/uL


 


RBC    (3.80-5.40)  m/uL


 


Hgb    (11.4-16.0)  gm/dL


 


Hct    (34.0-46.0)  %


 


Neutrophils #    (1.3-7.7)  k/uL


 


Lymphocytes #    (1.0-4.8)  k/uL


 


ABG pH  7.46 H  7.50 H  (7.35-7.45)  


 


ABG pCO2  52 H  47 H  (35-45)  mmHg


 


ABG pO2  82 L   ()  mmHg


 


ABG HCO3  36 H  36 H  (21-25)  mmol/L


 


ABG Total CO2  38 H  38 H  (19-24)  mmol/L


 


ABG O2 Saturation   98.2 H  (94-97)  %


 


Sodium    (137-145)  mmol/L


 


Chloride    ()  mmol/L


 


Carbon Dioxide    (22-30)  mmol/L


 


Creatinine    (0.52-1.04)  mg/dL


 


Glucose    (74-99)  mg/dL


 


POC Glucose (mg/dL)    (75-99)  mg/dL








                      Microbiology - Last 24 Hours (Table)











 09/13/19 11:50 Gram Stain - Preliminary





 Sputum Sputum Culture - Preliminary


 


 09/12/19 17:18 Blood Culture - Preliminary





 Blood    No Growth after 24 hours














Assessment and Plan


(1) Acute respiratory failure with hypoxia and hypercapnia


Narrative/Plan: 





* Patient sedated and ventilated with pulmonary following


* CT of the chest showing pulmonary interstitial and airspace infiltrates 

  possible underlying pneumonia


* Continue vent management per pulmonary, plan for sedation vacation and 

  possible spontaneous breathing trial if patient is able to tolerate


* Continue with breathing treatments and antibiotics


Current Visit: Yes   Status: Acute   Code(s): J96.01 - ACUTE RESPIRATORY FAILURE

WITH HYPOXIA; J96.02 - ACUTE RESPIRATORY FAILURE WITH HYPERCAPNIA   SNOMED 

Code(s): 420400261


   





(2) Hyponatremia


Narrative/Plan: 





* Severe profound hyponatremia on presentation with a sodium of 111 now up to 

  117


* Previously on hypertonic saline, nephrology following closely


Current Visit: Yes   Status: Acute   Code(s): E87.1 - HYPO-OSMOLALITY AND 

HYPONATREMIA   SNOMED Code(s): 73167552


   





(3) Mediastinal lymphadenopathy


Narrative/Plan: 





* CT of the chest showing extensive bronchial and subcarinal adenopathy


* Suspected to be metastatic versus lung primary


Current Visit: Yes   Status: Acute   Code(s): R59.0 - LOCALIZED ENLARGED LYMPH 

NODES   SNOMED Code(s): 76772369


   





(4) History of laryngeal cancer


Narrative/Plan: 





* Most recent treatment in January 2019


Current Visit: Yes   Status: Chronic   Code(s): Z85.21 - PERSONAL HISTORY OF 

MALIGNANT NEOPLASM OF LARYNX   SNOMED Code(s): 636368164


   





(5) Weakness


Narrative/Plan: 





* Secondary to profound hyponatremia


Current Visit: Yes   Status: Acute   Code(s): R53.1 - WEAKNESS   SNOMED Code(s):

78662789


   





(6) Severe protein-energy malnutrition


Current Visit: Yes   Status: Acute   Code(s): E43 - UNSPECIFIED SEVERE PROTEIN-

CALORIE MALNUTRITION   SNOMED Code(s): 706778531


   





(7) Metabolic encephalopathy


Narrative/Plan: 





* Secondary to hyponatremia


Current Visit: Yes   Status: Acute   Code(s): G93.41 - METABOLIC ENCEPHALOPATHY 

 SNOMED Code(s): 95040349


   





(8) Hypokalemia


Narrative/Plan: 





* Continue to monitor daily


Current Visit: Yes   Status: Resolved   Code(s): E87.6 - HYPOKALEMIA   SNOMED 

Code(s): 81100501


   


Plan: 








* The patient status to critical continue current management in hopes for 

  possible extubation today

## 2019-09-14 NOTE — P.PN
Subjective


Progress Note Date: 09/14/19


Principal diagnosis: 





This is a 53-year-old female seen in consultation because of severe hyponatremia

sodium is 111, this was deemed to be from hypovolemia decreased intake and is a 

patient with serial larynx with chemo and radiation.





She is currently on 30 mL of IV normal saline to slow down her sodium 

correction.  Sodium is 126 this morning





She remains on the vent on 40% FiO2.  A chest x-ray is rather clear.





She is on levo fed.  Blood pressures in the 90s





Urine output is excellent at 2078





Objective





- Vital Signs


Vital signs: 


                                   Vital Signs











Temp  98.6 F   09/14/19 08:00


 


Pulse  80   09/14/19 08:00


 


Resp  22   09/14/19 08:00


 


BP  99/70   09/14/19 08:00


 


Pulse Ox  98   09/14/19 08:00








                                 Intake & Output











 09/13/19 09/14/19 09/14/19





 18:59 06:59 18:59


 


Intake Total 2069.530 750.845 201.386


 


Output Total 1724 354 180


 


Balance 345.530 396.845 21.386


 


Weight 38.8 kg 40 kg 


 


Intake:   


 


  IV 1080 360 60


 


    .9 kvo 230 360 60


 


    Dextrose 5% in Water 1, 650  





    000 ml @ 50 mls/hr IV .   





    Q20H ONE Rx#:[id number]   


 


    Magnesium Sulfate-D5w Pmx 200  





    1 gm In Dextrose/Water 1   





    100ml.bag @ 100 mls/hr   





    IVPB Q1H [facility] Rx#:   





    [id number]   


 


  Intake, IV Titration 979.530 140.845 121.386





  Amount   


 


    Levofloxacin 500Mg-D5w 100  





    Pmx 500 mg In Dextrose/   





    Water 1 100ml.bag @ 100   





    mls/hr IVPB Q24H [facility] Rx#:   





    [id number]   


 


    Norepinephrine 4 mg In 186.711 140.845 21.386





    Sodium Chloride 0.9% 250   





    ml @ 0.05 MCG/KG/MIN 6.   





    481 mls/hr IV .Q24H [facility]   





    Rx#:[id number]   


 


    Piperacillin-Tazobactam 3 100  





    .375 gm In Sodium   





    Chloride 0.9% 100 ml @ 25   





    mls/hr IVPB Q12HR [facility] Rx   





    #:[id number]   


 


    Propofol 1,000 mg In 92.819  100





    Empty Bag 1 bag @ Titrate   





    IV .Q0M [facility] Rx#:   





    [id number]   


 


    Sodium Chloride 0.9% 500 500  





    ml 500 ml @ 999 mls/hr IV   





    .Q31M [facility] Rx#:[id number]   


 


  Tube Feeding 10 160 20


 


  Other  90 


 


Output:   


 


  Urine 1724 354 180


 


Other:   


 


  Voiding Method Indwelling Catheter Indwelling Catheter 








                       ABP, PAP, CO, CI - Last Documented











Arterial Blood Pressure        95/74











On examination she is obtunded on the vent at 40% FiO2





She is cachectic





HEENT exam no JVP neck is supple no facial asymmetry





Lungs are clear to auscultation fair air entry bilaterally





Heart sounds are unremarkable for any murmur rub gallop





Abdomen soft nontender scaphoid and extremely cachectic





Extremity exam was no edema





Neurologically she is obtunded because of sedation  but sedation is now 

discontinued





- Labs


CBC & Chem 7: 


                                 09/14/19 05:10





                                 09/14/19 05:10


Labs: 


                  Abnormal Lab Results - Last 24 Hours (Table)











  09/13/19 09/13/19 09/13/19 Range/Units





  11:30 11:46 11:47 


 


WBC     (3.8-10.6)  k/uL


 


RBC     (3.80-5.40)  m/uL


 


Hgb     (11.4-16.0)  gm/dL


 


Hct     (34.0-46.0)  %


 


Neutrophils #     (1.3-7.7)  k/uL


 


Lymphocytes #     (1.0-4.8)  k/uL


 


ABG pH     (7.35-7.45)  


 


ABG pCO2    54 H  (35-45)  mmHg


 


ABG pO2     ()  mmHg


 


ABG HCO3    34 H  (21-25)  mmol/L


 


ABG Total CO2    36 H  (19-24)  mmol/L


 


ABG O2 Saturation    98.2 H  (94-97)  %


 


Sodium  122 L    (137-145)  mmol/L


 


Chloride     ()  mmol/L


 


Carbon Dioxide     (22-30)  mmol/L


 


Creatinine     (0.52-1.04)  mg/dL


 


Glucose     (74-99)  mg/dL


 


POC Glucose (mg/dL)   178 H   (75-99)  mg/dL














  09/13/19 09/13/19 09/14/19 Range/Units





  16:33 19:50 00:08 


 


WBC     (3.8-10.6)  k/uL


 


RBC     (3.80-5.40)  m/uL


 


Hgb     (11.4-16.0)  gm/dL


 


Hct     (34.0-46.0)  %


 


Neutrophils #     (1.3-7.7)  k/uL


 


Lymphocytes #     (1.0-4.8)  k/uL


 


ABG pH     (7.35-7.45)  


 


ABG pCO2     (35-45)  mmHg


 


ABG pO2     ()  mmHg


 


ABG HCO3     (21-25)  mmol/L


 


ABG Total CO2     (19-24)  mmol/L


 


ABG O2 Saturation     (94-97)  %


 


Sodium  123 L  124 L   (137-145)  mmol/L


 


Chloride     ()  mmol/L


 


Carbon Dioxide     (22-30)  mmol/L


 


Creatinine     (0.52-1.04)  mg/dL


 


Glucose     (74-99)  mg/dL


 


POC Glucose (mg/dL)    138 H  (75-99)  mg/dL














  09/14/19 09/14/19 09/14/19 Range/Units





  05:10 05:10 06:06 


 


WBC  12.3 H    (3.8-10.6)  k/uL


 


RBC  3.13 L    (3.80-5.40)  m/uL


 


Hgb  9.9 L    (11.4-16.0)  gm/dL


 


Hct  29.7 L    (34.0-46.0)  %


 


Neutrophils #  11.6 H    (1.3-7.7)  k/uL


 


Lymphocytes #  0.1 L    (1.0-4.8)  k/uL


 


ABG pH     (7.35-7.45)  


 


ABG pCO2     (35-45)  mmHg


 


ABG pO2     ()  mmHg


 


ABG HCO3     (21-25)  mmol/L


 


ABG Total CO2     (19-24)  mmol/L


 


ABG O2 Saturation     (94-97)  %


 


Sodium   126 L   (137-145)  mmol/L


 


Chloride   87 L   ()  mmol/L


 


Carbon Dioxide   35 H   (22-30)  mmol/L


 


Creatinine   0.17 L   (0.52-1.04)  mg/dL


 


Glucose   126 H   (74-99)  mg/dL


 


POC Glucose (mg/dL)    121 H  (75-99)  mg/dL














  09/14/19 Range/Units





  08:04 


 


WBC   (3.8-10.6)  k/uL


 


RBC   (3.80-5.40)  m/uL


 


Hgb   (11.4-16.0)  gm/dL


 


Hct   (34.0-46.0)  %


 


Neutrophils #   (1.3-7.7)  k/uL


 


Lymphocytes #   (1.0-4.8)  k/uL


 


ABG pH  7.46 H  (7.35-7.45)  


 


ABG pCO2  52 H  (35-45)  mmHg


 


ABG pO2  82 L  ()  mmHg


 


ABG HCO3  36 H  (21-25)  mmol/L


 


ABG Total CO2  38 H  (19-24)  mmol/L


 


ABG O2 Saturation   (94-97)  %


 


Sodium   (137-145)  mmol/L


 


Chloride   ()  mmol/L


 


Carbon Dioxide   (22-30)  mmol/L


 


Creatinine   (0.52-1.04)  mg/dL


 


Glucose   (74-99)  mg/dL


 


POC Glucose (mg/dL)   (75-99)  mg/dL








                      Microbiology - Last 24 Hours (Table)











 09/13/19 11:50 Gram Stain - Preliminary





 Sputum Sputum Culture - Preliminary


 


 09/12/19 17:18 Blood Culture - Preliminary





 Blood    No Growth after 24 hours














Assessment and Plan


Assessment: 





Impression





1.  Hypovolemic hyponatremia being corrected appropriately.  Sodium is up to 136

from an admission sodium of 111.  Corrected over 48 hours.  Cortisol level is 

borderline at 13





2.  Ventilator dependent respiratory failure with mental status changes.  Being 

maintained.





3.  Blood gases reflect change from severe primary  respiratory acidosis to now 

mild degree of Metabolic alkalosis with a pH of 7.46 and it bicarb of 35.  In 

addition she has respiratory acidosis pCO2 is 52.





4.  Mild hypo-magnesium is secondary to low intake





5.  Anemia of chronic illness in mobility and went down from 12.3-9.9 may be an 

element of hydration





Admission





1.  Increase IV saline.  60 mL an hour.





2.  Expect the respiratory acidosis to improve with reduction in sedation and or

changes in ventilator settings here





3.  Watch hemoglobin.  Watch electrolytes carefully

## 2019-09-14 NOTE — P.PN
Subjective


Progress Note Date: 09/14/19


Principal diagnosis: 





Hyponatremia with metabolic encephalopathy and hypoxic and hypercapnic 

respiratory failure with pneumonia.  Possibly aspiration pneumonia





This is a 53-year-old female patient who presented to the emergency room 

yesterday with complaints of shortness of breath and profound weakness.  She has

a history of laryngeal cancer and is status post chemoradiation in January of 

this year.  He follows with physicians in the [place] area.  She lives at home 

with her 18-year-old son.  Over the past several weeks she has been having 

difficulty in swallowing and had been only tolerating liquids.  She is severely 

cachectic and weak.  She was found to be hyponatremic with a sodium of 111.  

Abdominal series revealed a nonacute abdomen.  There is lateral patchy pulmonary

interstitial and alveolar infiltrates and a small right pleural effusion noted. 

CT angiogram revealed emphysema and extensive pulmonary interstitial and 

airspace infiltrates.  No evidence of pulmonary embolism.  There is extensive 

bronchial adenopathy.  She was admitted to the intensive care unit from the 

emergency room and was clearly an respiratory distress and stridorous him a 

positive orthopnea.  Oxygen 90% on 4 L nasal cannula initially.  Arterial blood 

gases revealed a pO2 of 79, pCO2 of 107 and a pH of 7.13 on 32% FiO2.  She was 

intubated and placed on mechanical ventilator.  Subsequent blood gases revealed 

a PaO2 of 66, pCO2 of 67 and a pH of 7.30.  Vent settings were assist control of

20, tidal volume 300, FiO2 80% and a PEEP of 5.  She did receive 2-1/2 L of 0.9 

normal saline.  She is currently on norepinephrine at 5.8 mcg/m, propofol at 50 

mcg/kg/m, D5W at 50 MLS per hour.  Current sodium 117.  She is seen in the 

intensive care unit with follow-up arterial blood gases up pO2 of 75, pCO2 55 

and a pH of 7.4-60% FiO2.  We'll continue to titrate that down.  White count 8.

6.  Hemoglobin 10.5.  Sodium 117.  Potassium 3.4.  Bicarb 32.  Creatinine 0.16. 

Morning chest x-ray reveals basilar atelectasis/pneumonia or tumor with pleural 

effusion right greater than left.  She is extremely cachectic.  BMI 13.4.





Patient was reevaluated today on 9/14/2019, patient remains in the ICU, 

mechanically ventilated, hemodynamically stable, not requiring any 

norepinephrine at present.  Her ventilator settings are assist control rate of 

20 volume of 300 FiO2 40% and PEEP of 5.  ABG showed a pO2 of 82 pCO2 of 52 pH 

of 7.46, patient continues to have significant component of metabolic alkalosis,

felt to be more or less contraction alkalosis improving with hydration, however 

we are careful with hydration because of her low sodium and is being corrected 

slowly.  Her sodium today is 126.  Bicarb is 35 Hayden profile is normal.  Her 

WBC count is 12.3 hemoglobin is 9.9.  Patient remains on 15 mcg/kg/m of 

propofol, she is off norepinephrine, and her chest x-ray continues to show b

ilateral pneumonia/airspace disease.  Slightly improved and better aeration 

compared to admission chest x-ray.  Remains on DuoNeb, antibiotics in the form 

of Levaquin and Zosyn, remains on GI and DVT prophylaxis.  Blood cultures remain

negative so far, sputum cultures are nondiagnostic so far.  Patient remains 

empirically on antibiotics.











Objective





- Vital Signs


Vital signs: 


                                   Vital Signs











Temp  98.6 F   09/14/19 08:00


 


Pulse  98   09/14/19 11:00


 


Resp  23   09/14/19 11:00


 


BP  87/60   09/14/19 11:00


 


Pulse Ox  97   09/14/19 11:00








                                 Intake & Output











 09/13/19 09/14/19 09/14/19





 18:59 06:59 18:59


 


Intake Total 2069.530 750.845 985.702


 


Output Total 7444 271 4393


 


Balance 345.530 396.845 -14.298


 


Weight 38.8 kg 40 kg 


 


Intake:   


 


  IV 1080 360 240


 


    .9 kvo 230 360 240


 


    Dextrose 5% in Water 1, 650  





    000 ml @ 50 mls/hr IV .   





    Q20H ONE Rx#:[id number]   


 


    Magnesium Sulfate-D5w Pmx 200  





    1 gm In Dextrose/Water 1   





    100ml.bag @ 100 mls/hr   





    IVPB Q1H [facility] Rx#:   





    [id number]   


 


  Intake, IV Titration 979.530 140.845 695.702





  Amount   


 


    Levofloxacin 500Mg-D5w 100  200





    Pmx 500 mg In Dextrose/   





    Water 1 100ml.bag @ 100   





    mls/hr IVPB Q24H [facility] Rx#:   





    [id number]   


 


    Magnesium Sulfate-D5w Pmx   100





    1 gm In Dextrose/Water 1   





    100ml.bag @ 66.667 mls/   





    hr IVPB Q90M [facility] Rx#:   





    [id number]   


 


    Norepinephrine 4 mg In 186.711 140.845 25.382





    Sodium Chloride 0.9% 250   





    ml @ 0.05 MCG/KG/MIN 6.   





    481 mls/hr IV .Q24H [facility]   





    Rx#:[id number]   


 


    Piperacillin-Tazobactam 3 100  200





    .375 gm In Sodium   





    Chloride 0.9% 100 ml @ 25   





    mls/hr IVPB Q12HR [facility] Rx   





    #:[id number]   


 


    Propofol 1,000 mg In 92.819  110.32





    Empty Bag 1 bag @ Titrate   





    IV .Q0M [facility] Rx#:   





    [id number]   


 


    Sodium Chloride 0.9% 1,   60





    000 ml @ 60 mls/hr IV .   





    Q89D15M [facility] Rx#:[id number]   


 


    Sodium Chloride 0.9% 500 500  





    ml 500 ml @ 999 mls/hr IV   





    .Q31M ONE Rx#:[id number]   


 


  Tube Feeding 10 160 50


 


  Other  90 


 


Output:   


 


  Urine 5845 753 7199


 


Other:   


 


  Voiding Method Indwelling Catheter Indwelling Catheter Indwelling Catheter








                       ABP, PAP, CO, CI - Last Documented











Arterial Blood Pressure        92/66

















- Exam





Physical Exam: Revealed a 53-year-old female on mechanical ventilation, 

extremely frail and chronically ill-looking.  Looks cachectic.


Head: Atraumatic, normocephalic.


HEENT:[Neck is supple.] [No neck masses.] [No thyromegaly.] [No JVD.]  

Endotracheal tube and orogastric tube are intact.


Chest: [Crackles and rhonchi at the bases, no wheezing, symmetrical chest 

expansion.]  Right subclavian Mediport is noted.


Cardiac Exam: [Normal S1 and S2, no S3 gallop, no murmur.]


Abdomen: [, Scaphoid abdomen, Soft, nontender,  no megaly, no rebound, no 

guarding, normal bowel sounds.]


Extremities: [No clubbing, no edema, no cyanosis.  Significant muscle wasting is

 noted in upper and lower extremities.]


Neurological Exam: Arousable, follows simple instructions, looks chronically 

weak.  [No focal neurologic deficit.]


Psychiatric: Normal mood, affect and normal mental status examination.


Skin: No rashes.





- Labs


CBC & Chem 7: 


                                 09/14/19 05:10





                                 09/14/19 05:10


Labs: 


                  Abnormal Lab Results - Last 24 Hours (Table)











  09/13/19 09/13/19 09/13/19 Range/Units





  11:30 11:46 11:47 


 


WBC     (3.8-10.6)  k/uL


 


RBC     (3.80-5.40)  m/uL


 


Hgb     (11.4-16.0)  gm/dL


 


Hct     (34.0-46.0)  %


 


Neutrophils #     (1.3-7.7)  k/uL


 


Lymphocytes #     (1.0-4.8)  k/uL


 


ABG pH     (7.35-7.45)  


 


ABG pCO2    54 H  (35-45)  mmHg


 


ABG pO2     ()  mmHg


 


ABG HCO3    34 H  (21-25)  mmol/L


 


ABG Total CO2    36 H  (19-24)  mmol/L


 


ABG O2 Saturation    98.2 H  (94-97)  %


 


Sodium  122 L    (137-145)  mmol/L


 


Chloride     ()  mmol/L


 


Carbon Dioxide     (22-30)  mmol/L


 


Creatinine     (0.52-1.04)  mg/dL


 


Glucose     (74-99)  mg/dL


 


POC Glucose (mg/dL)   178 H   (75-99)  mg/dL














  09/13/19 09/13/19 09/14/19 Range/Units





  16:33 19:50 00:08 


 


WBC     (3.8-10.6)  k/uL


 


RBC     (3.80-5.40)  m/uL


 


Hgb     (11.4-16.0)  gm/dL


 


Hct     (34.0-46.0)  %


 


Neutrophils #     (1.3-7.7)  k/uL


 


Lymphocytes #     (1.0-4.8)  k/uL


 


ABG pH     (7.35-7.45)  


 


ABG pCO2     (35-45)  mmHg


 


ABG pO2     ()  mmHg


 


ABG HCO3     (21-25)  mmol/L


 


ABG Total CO2     (19-24)  mmol/L


 


ABG O2 Saturation     (94-97)  %


 


Sodium  123 L  124 L   (137-145)  mmol/L


 


Chloride     ()  mmol/L


 


Carbon Dioxide     (22-30)  mmol/L


 


Creatinine     (0.52-1.04)  mg/dL


 


Glucose     (74-99)  mg/dL


 


POC Glucose (mg/dL)    138 H  (75-99)  mg/dL














  09/14/19 09/14/19 09/14/19 Range/Units





  05:10 05:10 06:06 


 


WBC  12.3 H    (3.8-10.6)  k/uL


 


RBC  3.13 L    (3.80-5.40)  m/uL


 


Hgb  9.9 L    (11.4-16.0)  gm/dL


 


Hct  29.7 L    (34.0-46.0)  %


 


Neutrophils #  11.6 H    (1.3-7.7)  k/uL


 


Lymphocytes #  0.1 L    (1.0-4.8)  k/uL


 


ABG pH     (7.35-7.45)  


 


ABG pCO2     (35-45)  mmHg


 


ABG pO2     ()  mmHg


 


ABG HCO3     (21-25)  mmol/L


 


ABG Total CO2     (19-24)  mmol/L


 


ABG O2 Saturation     (94-97)  %


 


Sodium   126 L   (137-145)  mmol/L


 


Chloride   87 L   ()  mmol/L


 


Carbon Dioxide   35 H   (22-30)  mmol/L


 


Creatinine   0.17 L   (0.52-1.04)  mg/dL


 


Glucose   126 H   (74-99)  mg/dL


 


POC Glucose (mg/dL)    121 H  (75-99)  mg/dL














  09/14/19 Range/Units





  08:04 


 


WBC   (3.8-10.6)  k/uL


 


RBC   (3.80-5.40)  m/uL


 


Hgb   (11.4-16.0)  gm/dL


 


Hct   (34.0-46.0)  %


 


Neutrophils #   (1.3-7.7)  k/uL


 


Lymphocytes #   (1.0-4.8)  k/uL


 


ABG pH  7.46 H  (7.35-7.45)  


 


ABG pCO2  52 H  (35-45)  mmHg


 


ABG pO2  82 L  ()  mmHg


 


ABG HCO3  36 H  (21-25)  mmol/L


 


ABG Total CO2  38 H  (19-24)  mmol/L


 


ABG O2 Saturation   (94-97)  %


 


Sodium   (137-145)  mmol/L


 


Chloride   ()  mmol/L


 


Carbon Dioxide   (22-30)  mmol/L


 


Creatinine   (0.52-1.04)  mg/dL


 


Glucose   (74-99)  mg/dL


 


POC Glucose (mg/dL)   (75-99)  mg/dL








                      Microbiology - Last 24 Hours (Table)











 09/13/19 11:50 Gram Stain - Preliminary





 Sputum Sputum Culture - Preliminary


 


 09/12/19 17:18 Blood Culture - Preliminary





 Blood    No Growth after 24 hours














Assessment and Plan


Assessment: 





#1 Acute metabolic encephalopathy secondary to severe hyponatremia with 

presenting sodium 111.  Presently her sodium is up to 126, correcting slowly.  

Hyponatremia was felt to be hypovolemic in nature.





#2 Acute hypoxic/hypercapnic respiratory failure due to extensive pulmonary 

interstitial and airspace infiltrates.  There is also extensive 

bronchial/subcarinal adenopathy noted suspect metastasis versus lung primary.  





#3 Severe hyponatremia with profound weakness.  Presenting sodium 111 current 

sodium 117.





#4 History of laryngeal cancer status post chemo/radiation.  Details are not 

available.  Last treatment in January 2019.





#5 Severe protein calorie malnutrition secondary to above.





#6 History of previous tobacco dependence.





#7 anemia of chronic disease is suspected.





#8 hypomagnesemia secondary to poor oral intake.





#9 acute contraction metabolic alkalosis with respiratory 

compensation/respiratory acidosis.





Recommendation: Continue IV saline at 60 mL per hour.  Continue antibiotics 

empirically in the form of Zosyn and Levaquin.  Adjust antibiotics based on the 

sputum cultures when become available.  Continue ventilatory support, patient 

will be given a sedation interruption trial, and possibly spontaneous breathing 

trial with pressure support of 8 and CPAP.  Patient will remain on GI and DVT 

prophylaxis.  Continue nutritional support, IBC she has severe protein calorie 

malnutrition.  Overall long-term prognosis is extremely poor and guarded, we'll 

continue to follow the patient in the ICU even if extubated today.  If not 

extubated today, we will readdress the issue of her weaning again tomorrow in 

the next 24 hours.  Obviously the patient remains critically ill and multiple 

comorbidities as noted above.  Critical care time is 40 minutes.








Time with Patient: Greater than 30

## 2019-09-15 LAB
ANION GAP SERPL CALC-SCNC: 4 MMOL/L
BASOPHILS # BLD AUTO: 0 K/UL (ref 0–0.2)
BASOPHILS NFR BLD AUTO: 0 %
BUN SERPL-SCNC: 6 MG/DL (ref 7–17)
CALCIUM SPEC-MCNC: 8 MG/DL (ref 8.4–10.2)
CHLORIDE SERPL-SCNC: 88 MMOL/L (ref 98–107)
CO2 SERPL-SCNC: 38 MMOL/L (ref 22–30)
EOSINOPHIL # BLD AUTO: 0 K/UL (ref 0–0.7)
EOSINOPHIL NFR BLD AUTO: 0 %
ERYTHROCYTE [DISTWIDTH] IN BLOOD BY AUTOMATED COUNT: 3.23 M/UL (ref 3.8–5.4)
ERYTHROCYTE [DISTWIDTH] IN BLOOD: 13 % (ref 11.5–15.5)
GLUCOSE SERPL-MCNC: 104 MG/DL (ref 74–99)
HCT VFR BLD AUTO: 31.6 % (ref 34–46)
HGB BLD-MCNC: 10.5 GM/DL (ref 11.4–16)
LYMPHOCYTES # SPEC AUTO: 0.1 K/UL (ref 1–4.8)
LYMPHOCYTES NFR SPEC AUTO: 1 %
MAGNESIUM SPEC-SCNC: 2 MG/DL (ref 1.6–2.3)
MCH RBC QN AUTO: 32.4 PG (ref 25–35)
MCHC RBC AUTO-ENTMCNC: 33.1 G/DL (ref 31–37)
MCV RBC AUTO: 97.8 FL (ref 80–100)
MONOCYTES # BLD AUTO: 0.4 K/UL (ref 0–1)
MONOCYTES NFR BLD AUTO: 3 %
NEUTROPHILS # BLD AUTO: 14.9 K/UL (ref 1.3–7.7)
NEUTROPHILS NFR BLD AUTO: 96 %
PLATELET # BLD AUTO: 397 K/UL (ref 150–450)
POTASSIUM SERPL-SCNC: 3.4 MMOL/L (ref 3.5–5.1)
SODIUM SERPL-SCNC: 130 MMOL/L (ref 137–145)
WBC # BLD AUTO: 15.5 K/UL (ref 3.8–10.6)

## 2019-09-15 PROCEDURE — 5A09557 ASSISTANCE WITH RESPIRATORY VENTILATION, GREATER THAN 96 CONSECUTIVE HOURS, CONTINUOUS POSITIVE AIRWAY PRESSURE: ICD-10-PCS

## 2019-09-15 RX ADMIN — METHYLPREDNISOLONE SODIUM SUCCINATE SCH MG: 125 INJECTION, POWDER, FOR SOLUTION INTRAMUSCULAR; INTRAVENOUS at 18:46

## 2019-09-15 RX ADMIN — IPRATROPIUM BROMIDE AND ALBUTEROL SULFATE SCH ML: .5; 3 SOLUTION RESPIRATORY (INHALATION) at 19:08

## 2019-09-15 RX ADMIN — PIPERACILLIN AND TAZOBACTAM SCH MLS/HR: 3; .375 INJECTION, POWDER, FOR SOLUTION INTRAVENOUS at 16:11

## 2019-09-15 RX ADMIN — HEPARIN SODIUM SCH UNIT: 5000 INJECTION, SOLUTION INTRAVENOUS; SUBCUTANEOUS at 16:11

## 2019-09-15 RX ADMIN — CHLORHEXIDINE GLUCONATE SCH ML: 1.2 RINSE ORAL at 08:21

## 2019-09-15 RX ADMIN — CEFAZOLIN SCH MLS/HR: 330 INJECTION, POWDER, FOR SOLUTION INTRAMUSCULAR; INTRAVENOUS at 11:20

## 2019-09-15 RX ADMIN — CHLORHEXIDINE GLUCONATE SCH: 1.2 RINSE ORAL at 01:42

## 2019-09-15 RX ADMIN — IPRATROPIUM BROMIDE AND ALBUTEROL SULFATE SCH ML: .5; 3 SOLUTION RESPIRATORY (INHALATION) at 15:30

## 2019-09-15 RX ADMIN — PIPERACILLIN AND TAZOBACTAM SCH MLS/HR: 3; .375 INJECTION, POWDER, FOR SOLUTION INTRAVENOUS at 08:21

## 2019-09-15 RX ADMIN — IPRATROPIUM BROMIDE AND ALBUTEROL SULFATE SCH ML: .5; 3 SOLUTION RESPIRATORY (INHALATION) at 11:01

## 2019-09-15 RX ADMIN — MEGESTROL ACETATE SCH MG: 40 SUSPENSION ORAL at 10:08

## 2019-09-15 RX ADMIN — HEPARIN SODIUM SCH UNIT: 5000 INJECTION, SOLUTION INTRAVENOUS; SUBCUTANEOUS at 08:21

## 2019-09-15 RX ADMIN — POTASSIUM BICARBONATE SCH MEQ: 782 TABLET, EFFERVESCENT ORAL at 08:20

## 2019-09-15 RX ADMIN — POTASSIUM BICARBONATE SCH MEQ: 782 TABLET, EFFERVESCENT ORAL at 06:23

## 2019-09-15 RX ADMIN — CEFAZOLIN SCH: 330 INJECTION, POWDER, FOR SOLUTION INTRAMUSCULAR; INTRAVENOUS at 08:13

## 2019-09-15 RX ADMIN — LEVOFLOXACIN SCH MLS/HR: 5 INJECTION, SOLUTION INTRAVENOUS at 09:16

## 2019-09-15 RX ADMIN — PANTOPRAZOLE SODIUM SCH MG: 40 INJECTION, POWDER, FOR SOLUTION INTRAVENOUS at 08:20

## 2019-09-15 NOTE — XR
EXAMINATION TYPE: XR chest 1V portable

 

DATE OF EXAM: 9/15/2019

 

HISTORY: Tube placement.

 

REFERENCE: Previous study dated 9/14/2019.

 

FINDINGS: The patient has been extubated. The patient is NG tube is been removed. A Port-A-Cath remai
ns in place via a right internal jugular approach. Within the right atrium. The left basilic PICC claudia
e is in place. Its tip is in the superior vena cava.

 

There is worsening right middle lobe airspace atelectasis. There is bibasilar airspace disease. Heart
 size is obscured. I cannot exclude small effusions.

 

IMPRESSION: 

1. WORSENING RIGHT MIDDLE LOBE ATELECTASIS.

2. BIBASILAR AIRSPACE DISEASE.

3. I CANNOT EXCLUDE SMALL, BILATERAL EFFUSIONS.

## 2019-09-15 NOTE — P.PN
Subjective


Progress Note Date: 09/15/19


Principal diagnosis: 





This is a 53-year-old female seen in consultation because of severe hyponatremia

sodium is 111, this was deemed to be from hypovolemia decreased intake and is a 

patient with carcinoma larynx with chemo and radiation.





She was on 30 mL of IV normal saline to slow down her sodium correction, IV 

fluids were increased to 60 an hour.  Her sodium improved from 126 to 1:30 this 

morning.





Further she was extubated yesterday.  She is off of the levo fed 





Her chest x-ray though shows atelectasis on the right side which was not there 

yesterday 





Urine output is excellent at 2078 date 4 yesterday and 2515 this morning 





Objective





- Vital Signs


Vital signs: 


                                   Vital Signs











Temp  98.1 F   09/15/19 08:00


 


Pulse  125 H  09/15/19 10:00


 


Resp  41 H  09/15/19 10:00


 


BP  107/68   09/15/19 10:00


 


Pulse Ox  83 L  09/15/19 10:00








                                 Intake & Output











 09/14/19 09/15/19 09/15/19





 18:59 06:59 18:59


 


Intake Total 7505.702 1270 890


 


Output Total 1440 1075 310


 


Balance 6065.702 195 580


 


Weight  41.8 kg 


 


Intake:   


 


    


 


    .9 kvo 240  


 


  Intake, IV Titration 7215.702 820 440





  Amount   


 


    Levofloxacin 500Mg-D5w 200  100





    Pmx 500 mg In Dextrose/   





    Water 1 100ml.bag @ 100   





    mls/hr IVPB Q24H [facility] Rx#:   





    [id number]   


 


    Magnesium Sulfate-D5w Pmx 200  





    1 gm In Dextrose/Water 1   





    100ml.bag @ 66.667 mls/   





    hr IVPB Q90M [facility] Rx#:   





    [id number]   


 


    Norepinephrine 4 mg In 25.382  





    Sodium Chloride 0.9% 250   





    ml @ 0.05 MCG/KG/MIN 6.   





    481 mls/hr IV .Q24H [facility]   





    Rx#:[id number]   


 


    Piperacillin-Tazobactam 3 200 100 100





    .375 gm In Sodium   





    Chloride 0.9% 100 ml @ 25   





    mls/hr IVPB Q12HR [facility] Rx   





    #:[id number]   


 


    Propofol 1,000 mg In 110.32  





    Empty Bag 1 bag @ Titrate   





    IV .Q0M [facility] Rx#:   





    [id number]   


 


    Sodium Chloride 0.9% 1, 6480 720 240





    000 ml @ 60 mls/hr IV .   





    [id number] [facility] Rx#:[id number]   


 


  Oral  450 450


 


  Tube Feeding 50  


 


Output:   


 


  Urine 1440 1075 310


 


Other:   


 


  Voiding Method Indwelling Catheter Indwelling Catheter Indwelling Catheter








                       ABP, PAP, CO, CI - Last Documented











Arterial Blood Pressure        75/63








On examination she is awake alert oriented comfortable sitting in a chair.





HEENT exam no JVP neck is supple no facial asymmetry pupils are equal





She is very cachectic





Lungs are clear to auscultation except for poor air entry bilaterally.





Heart sounds are unremarkable no murmur rub gallop normal sinus rhythm





Abdomen soft nontender.  No masses felt





Extreme exam was no edema





Neurologically awake alert oriented generalized weakness








- Labs


CBC & Chem 7: 


                                 09/15/19 04:46





                                 09/15/19 04:46


Labs: 


                  Abnormal Lab Results - Last 24 Hours (Table)











  09/14/19 09/14/19 09/15/19 Range/Units





  11:52 12:23 04:46 


 


WBC    15.5 H  (3.8-10.6)  k/uL


 


RBC    3.23 L  (3.80-5.40)  m/uL


 


Hgb    10.5 L  (11.4-16.0)  gm/dL


 


Hct    31.6 L  (34.0-46.0)  %


 


Neutrophils #    14.9 H  (1.3-7.7)  k/uL


 


Lymphocytes #    0.1 L  (1.0-4.8)  k/uL


 


ABG pH  7.50 H    (7.35-7.45)  


 


ABG pCO2  47 H    (35-45)  mmHg


 


ABG HCO3  36 H    (21-25)  mmol/L


 


ABG Total CO2  38 H    (19-24)  mmol/L


 


ABG O2 Saturation  98.2 H    (94-97)  %


 


Sodium     (137-145)  mmol/L


 


Potassium     (3.5-5.1)  mmol/L


 


Chloride     ()  mmol/L


 


Carbon Dioxide     (22-30)  mmol/L


 


BUN     (7-17)  mg/dL


 


Creatinine     (0.52-1.04)  mg/dL


 


Glucose     (74-99)  mg/dL


 


POC Glucose (mg/dL)   123 H   (75-99)  mg/dL


 


Calcium     (8.4-10.2)  mg/dL














  09/15/19 Range/Units





  04:46 


 


WBC   (3.8-10.6)  k/uL


 


RBC   (3.80-5.40)  m/uL


 


Hgb   (11.4-16.0)  gm/dL


 


Hct   (34.0-46.0)  %


 


Neutrophils #   (1.3-7.7)  k/uL


 


Lymphocytes #   (1.0-4.8)  k/uL


 


ABG pH   (7.35-7.45)  


 


ABG pCO2   (35-45)  mmHg


 


ABG HCO3   (21-25)  mmol/L


 


ABG Total CO2   (19-24)  mmol/L


 


ABG O2 Saturation   (94-97)  %


 


Sodium  130 L  (137-145)  mmol/L


 


Potassium  3.4 L  (3.5-5.1)  mmol/L


 


Chloride  88 L  ()  mmol/L


 


Carbon Dioxide  38 H  (22-30)  mmol/L


 


BUN  6 L  (7-17)  mg/dL


 


Creatinine  0.18 L  (0.52-1.04)  mg/dL


 


Glucose  104 H  (74-99)  mg/dL


 


POC Glucose (mg/dL)   (75-99)  mg/dL


 


Calcium  8.0 L  (8.4-10.2)  mg/dL








                      Microbiology - Last 24 Hours (Table)











 09/13/19 11:50 Gram Stain - Final





 Sputum Sputum Culture - Final


 


 09/12/19 17:18 Blood Culture - Preliminary





 Blood    No Growth after 48 hours














Assessment and Plan


Assessment: 





Impression





1.  Hypovolemic hyponatremia being corrected appropriately.  Sodium is up to 

1:30  from an admission sodium of 111.  Creatinine is 0.1, BUN is 6   Cortisol 

level is borderline at 13





2.  Ventilator dependent respiratory failure with mental status changes.  Extu

bated 09/14/2019 and stable





3.  Blood gases yesterday pH is 7.5, pCO2 47 and pO2 is 98 while she was 

intubated.  This shows significant alkalemia, secondary to steroids and volume 

depletion as well as a compensatory response to severes respiratory acidosis.  

The serum bicarb will be corrected slowly.








4.  Mild hypo kalemia secondary to low intake, saline IV and steroids 





5.  Anemia of chronic illness in mobility and went down from 12.3-9.9 >  10.5.  

may be an element of hydration





6.  New significant atelectasis right lower lobe, post extubation





Admission





1.  Increase IV saline.  60 mL an hour to 100.





2.  Expect the respiratory acidosis to improve with  respiratory therapy. 





3.  Watch hemoglobin.  Watch electrolytes carefully





4.  Would suggest discontinuation of Anderson catheter

## 2019-09-15 NOTE — P.PN
Subjective


Progress Note Date: 09/15/19


Principal diagnosis: 





Hyponatremia with metabolic encephalopathy and hypoxic and hypercapnic 

respiratory failure with pneumonia.  Possibly aspiration pneumonia





This is a 53-year-old female patient who presented to the emergency room 

yesterday with complaints of shortness of breath and profound weakness.  She has

a history of laryngeal cancer and is status post chemoradiation in January of 

this year.  He follows with physicians in the [place] area.  She lives at home 

with her 18-year-old son.  Over the past several weeks she has been having 

difficulty in swallowing and had been only tolerating liquids.  She is severely 

cachectic and weak.  She was found to be hyponatremic with a sodium of 111.  

Abdominal series revealed a nonacute abdomen.  There is lateral patchy pulmonary

interstitial and alveolar infiltrates and a small right pleural effusion noted. 

CT angiogram revealed emphysema and extensive pulmonary interstitial and 

airspace infiltrates.  No evidence of pulmonary embolism.  There is extensive 

bronchial adenopathy.  She was admitted to the intensive care unit from the 

emergency room and was clearly an respiratory distress and stridorous him a 

positive orthopnea.  Oxygen 90% on 4 L nasal cannula initially.  Arterial blood 

gases revealed a pO2 of 79, pCO2 of 107 and a pH of 7.13 on 32% FiO2.  She was 

intubated and placed on mechanical ventilator.  Subsequent blood gases revealed 

a PaO2 of 66, pCO2 of 67 and a pH of 7.30.  Vent settings were assist control of

20, tidal volume 300, FiO2 80% and a PEEP of 5.  She did receive 2-1/2 L of 0.9 

normal saline.  She is currently on norepinephrine at 5.8 mcg/m, propofol at 50 

mcg/kg/m, D5W at 50 MLS per hour.  Current sodium 117.  She is seen in the 

intensive care unit with follow-up arterial blood gases up pO2 of 75, pCO2 55 

and a pH of 7.4-60% FiO2.  We'll continue to titrate that down.  White count 8.

6.  Hemoglobin 10.5.  Sodium 117.  Potassium 3.4.  Bicarb 32.  Creatinine 0.16. 

Morning chest x-ray reveals basilar atelectasis/pneumonia or tumor with pleural 

effusion right greater than left.  She is extremely cachectic.  BMI 13.4.





Patient was reevaluated today on 9/14/2019, patient remains in the ICU, 

mechanically ventilated, hemodynamically stable, not requiring any 

norepinephrine at present.  Her ventilator settings are assist control rate of 

20 volume of 300 FiO2 40% and PEEP of 5.  ABG showed a pO2 of 82 pCO2 of 52 pH 

of 7.46, patient continues to have significant component of metabolic alkalosis,

felt to be more or less contraction alkalosis improving with hydration, however 

we are careful with hydration because of her low sodium and is being corrected 

slowly.  Her sodium today is 126.  Bicarb is 35 Hayden profile is normal.  Her 

WBC count is 12.3 hemoglobin is 9.9.  Patient remains on 15 mcg/kg/m of 

propofol, she is off norepinephrine, and her chest x-ray continues to show b

ilateral pneumonia/airspace disease.  Slightly improved and better aeration 

compared to admission chest x-ray.  Remains on DuoNeb, antibiotics in the form 

of Levaquin and Zosyn, remains on GI and DVT prophylaxis.  Blood cultures remain

negative so far, sputum cultures are nondiagnostic so far.  Patient remains 

empirically on antibiotics.





Reevaluated today on 9/15/2019, patient was extubated successfully yesterday.  

She is now on few liters nasal cannula, in no distress, feeling better, please t

o be extubated.  Her chest x-ray continues to show bilateraldisease, patient 

remains on antibiotics in the form of Levaquin and Zosyn.  Her sodium is 

significantly improved, it is 130 today.  And she remains on 0.9 normal saline 

at 60 mL per hour.  Patient is hemodynamically stable, not requiring any 

pressors.  Urine output is excellent this morning.  Her labs were reviewed her 

WBC count is 15.5 hemoglobin is 10.5.  Patient remains metabolically alkalotic 

and that is contraction alkalosis.  BUN is 6 creatinine is 0.18.  Considering 

her poor appetite, and considering her protein calorie malnutrition, I started 

the patient today on Megace, and I recommended dietitian to evaluate for 

nutritional support.  Will continue to monitor the patient in the ICU for the 

next 24 hours, and if she continues to improve over the next 24 hours, then she 

could be transferred to a regular medical floor.











Objective





- Vital Signs


Vital signs: 


                                   Vital Signs











Temp  98.1 F   09/15/19 08:00


 


Pulse  125 H  09/15/19 10:00


 


Resp  41 H  09/15/19 10:00


 


BP  107/68   09/15/19 10:00


 


Pulse Ox  83 L  09/15/19 10:00








                                 Intake & Output











 09/14/19 09/15/19 09/15/19





 18:59 06:59 18:59


 


Intake Total 7505.702 1270 890


 


Output Total 1440 1075 310


 


Balance 6065.702 195 580


 


Weight  41.8 kg 


 


Intake:   


 


    


 


    .9 kvo 240  


 


  Intake, IV Titration 7215.702 820 440





  Amount   


 


    Levofloxacin 500Mg-D5w 200  100





    Pmx 500 mg In Dextrose/   





    Water 1 100ml.bag @ 100   





    mls/hr IVPB Q24H [facility] Rx#:   





    [id number]   


 


    Magnesium Sulfate-D5w Pmx 200  





    1 gm In Dextrose/Water 1   





    100ml.bag @ 66.667 mls/   





    hr IVPB Q90M [facility] Rx#:   





    [id number]   


 


    Norepinephrine 4 mg In 25.382  





    Sodium Chloride 0.9% 250   





    ml @ 0.05 MCG/KG/MIN 6.   





    481 mls/hr IV .Q24H [facility]   





    Rx#:[id number]   


 


    Piperacillin-Tazobactam 3 200 100 100





    .375 gm In Sodium   





    Chloride 0.9% 100 ml @ 25   





    mls/hr IVPB Q12HR [facility] Rx   





    #:[id number]   


 


    Propofol 1,000 mg In 110.32  





    Empty Bag 1 bag @ Titrate   





    IV .Q0M [facility] Rx#:   





    [id number]   


 


    Sodium Chloride 0.9% 1, 6480 720 240





    000 ml @ 60 mls/hr IV .   





    [id number] [facility] Rx#:[id number]   


 


  Oral  450 450


 


  Tube Feeding 50  


 


Output:   


 


  Urine 1440 1075 310


 


Other:   


 


  Voiding Method Indwelling Catheter Indwelling Catheter Indwelling Catheter








                       ABP, PAP, CO, CI - Last Documented











Arterial Blood Pressure        75/63

















- Exam





Physical Exam: Revealed a 53-year-old female, extremely cachectic, off 

mechanical ventilation, on few liters nasal cannula.


Head: Atraumatic, normocephalic.


HEENT:[Neck is supple.] [No neck masses.] [No thyromegaly.] [No JVD.]  

Endotracheal tube and orogastric tube are intact.


Chest: [Crackles and rhonchi at the bases, no wheezing, symmetrical chest exp

ansion.]  Right subclavian Mediport is noted.


Cardiac Exam: [Normal S1 and S2, no S3 gallop, no murmur.]


Abdomen: [, Scaphoid abdomen, Soft, nontender,  no megaly, no rebound, no 

guarding, normal bowel sounds.]


Extremities: [No clubbing, no edema, no cyanosis.  Significant muscle wasting is

 noted in upper and lower extremities.]


Neurological Exam: Awake, alert, no gross focal neurologic deficit but she is 

noted to be generally weak.


Psychiatric: Normal mood, affect and normal mental status examination.


Skin: No rashes.





- Labs


CBC & Chem 7: 


                                 09/15/19 04:46





                                 09/15/19 04:46


Labs: 


                  Abnormal Lab Results - Last 24 Hours (Table)











  09/14/19 09/14/19 09/15/19 Range/Units





  11:52 12:23 04:46 


 


WBC    15.5 H  (3.8-10.6)  k/uL


 


RBC    3.23 L  (3.80-5.40)  m/uL


 


Hgb    10.5 L  (11.4-16.0)  gm/dL


 


Hct    31.6 L  (34.0-46.0)  %


 


Neutrophils #    14.9 H  (1.3-7.7)  k/uL


 


Lymphocytes #    0.1 L  (1.0-4.8)  k/uL


 


ABG pH  7.50 H    (7.35-7.45)  


 


ABG pCO2  47 H    (35-45)  mmHg


 


ABG HCO3  36 H    (21-25)  mmol/L


 


ABG Total CO2  38 H    (19-24)  mmol/L


 


ABG O2 Saturation  98.2 H    (94-97)  %


 


Sodium     (137-145)  mmol/L


 


Potassium     (3.5-5.1)  mmol/L


 


Chloride     ()  mmol/L


 


Carbon Dioxide     (22-30)  mmol/L


 


BUN     (7-17)  mg/dL


 


Creatinine     (0.52-1.04)  mg/dL


 


Glucose     (74-99)  mg/dL


 


POC Glucose (mg/dL)   123 H   (75-99)  mg/dL


 


Calcium     (8.4-10.2)  mg/dL














  09/15/19 Range/Units





  04:46 


 


WBC   (3.8-10.6)  k/uL


 


RBC   (3.80-5.40)  m/uL


 


Hgb   (11.4-16.0)  gm/dL


 


Hct   (34.0-46.0)  %


 


Neutrophils #   (1.3-7.7)  k/uL


 


Lymphocytes #   (1.0-4.8)  k/uL


 


ABG pH   (7.35-7.45)  


 


ABG pCO2   (35-45)  mmHg


 


ABG HCO3   (21-25)  mmol/L


 


ABG Total CO2   (19-24)  mmol/L


 


ABG O2 Saturation   (94-97)  %


 


Sodium  130 L  (137-145)  mmol/L


 


Potassium  3.4 L  (3.5-5.1)  mmol/L


 


Chloride  88 L  ()  mmol/L


 


Carbon Dioxide  38 H  (22-30)  mmol/L


 


BUN  6 L  (7-17)  mg/dL


 


Creatinine  0.18 L  (0.52-1.04)  mg/dL


 


Glucose  104 H  (74-99)  mg/dL


 


POC Glucose (mg/dL)   (75-99)  mg/dL


 


Calcium  8.0 L  (8.4-10.2)  mg/dL








                      Microbiology - Last 24 Hours (Table)











 09/13/19 11:50 Gram Stain - Final





 Sputum Sputum Culture - Final


 


 09/12/19 17:18 Blood Culture - Preliminary





 Blood    No Growth after 48 hours














Assessment and Plan


Assessment: 





#1 Acute metabolic encephalopathy secondary to severe hyponatremia with 

presenting sodium 111.  Sodium today is 130, patient clearly had a hypovolemic 

hyponatremia.





#2 Acute hypoxic/hypercapnic respiratory failure due to extensive pulmonary 

interstitial and airspace infiltrates.  There is also extensive 

bronchial/subcarinal adenopathy noted suspect metastasis doubt primary lung 

cancer.  Presently the patient is not a candidate for any bronchoscopy or 

surgical procedures.  She will not tolerate any of these procedures at this 

point.  Patient was extubated successfully on 9/14/2019, and presently on few 

liters nasal cannula.





#3 Severe hyponatremia with profound weakness.  Presenting sodium 111, sodium 

today is 130.





#4 History of laryngeal cancer status post chemo/radiation.,  Possibly 

metastatic considering her abnormal CT of the chest.





#5 Severe protein calorie malnutrition secondary to above.  Patient was placed 

on Megace today 400 mg daily and requested a consultation with dietitian for 

nutritional support.





#6 History of previous tobacco dependence.





#7 anemia of chronic disease is suspected.





#8 hypomagnesemia secondary to poor oral intake.





#9 acute contraction metabolic alkalosis with respiratory 

compensation/respiratory acidosis.





Recommendation: 


Continue to monitor in the ICU for the next 24 hours.


Continue antibiotics including Zosyn and Levaquin


Nutritional support, and dietary consultation, Megace was started.


Continue bronchodilators.


Continue GI and DVT prophylaxis.


Continue present supportive care measures addressing all the different issues 

above,


Continue to monitor her electrolytes and renal profile.  And correct 

accordingly.


Consider transferring the patient out of the ICU in the next 24 hours, and she w

ill eventually need placement.  Physical therapy and rehabilitation.  We will 

continue to follow.

















Time with Patient: Less than 30

## 2019-09-15 NOTE — XR
EXAMINATION TYPE: XR chest 1V

 

DATE OF EXAM: 9/15/2019

 

CLINICAL HISTORY: Hypoxia.  

 

TECHNIQUE: Single AP portable upright view of the chest is obtained.

 

COMPARISON: Chest x-ray from earlier today and older CTs. CTA chest 3 days ago.

 

FINDINGS:  Stable right internal jugular Mediport catheter and left sided PICC line. Cardiac silhouet
te size is stable and within normal limits. Increasing bibasilar opacities is present. Background chr
onic parenchymal change. Osseous structures are demineralized.

 

IMPRESSION: There is persistent small right pleural effusion. There is background chronic emphysemato
us change with increasing bilateral lower lung edema and/or infiltrates.

## 2019-09-15 NOTE — P.PN
Subjective


Progress Note Date: 09/15/19


Principal diagnosis: 





The patient is a 53-year-old  female with a history of laryngeal cancer

status post chemo and radiation therapy that was admitted for acute respiratory 

failure with hypoxia, acute metabolic encephalopathy and symptomatic 

hyponatremia after presenting with progressive generalized weakness and 

difficulty breathing.  A workup with CT of her chest abdomen pelvis showed 

extensive bilateral lung infiltrates and consolidation with bilateral bronchial 

lymph node enlargement and subcarinal lymph node enlargement, initial Arterial 

blood gases revealed a pO2 of 79, pCO2 of 107 and a pH of 7.13 on 32% FiO2 and 

the patient was intubated and placed on mechanical ventilator.  She was started 

on empiric IV antibiotics with Levaquin and Zosyn, serum sodium level was 

profoundly low at 111 the patient was given hypertonic 3% saline at 20 mL an 

hour and subsequent switched to normal saline with nephrology Dr. Hutton 

consulted to manage the patient's hyponatremia.  The patient was sedated on 

propofol and started on levophed drip to keep map greater than 65.  





Patient seen and examined in follow-up today, extubated successfully yesterday 

currently on 4 L via nasal cannula desaturation noted with minimal exertion.  

The patient mentions lower back pain.  White count up to 15.3 from 12.2, serum 

sodium 1:30 potassium 3.4 today.  Hemoglobin at 10.5 stable.  The chest x-ray 

indicating worsening right middle lobe atelectasis and bibasilar airspace 

disease.  No acute events patient afebrile continues to be tachycardic and Neck





Objective





- Vital Signs


Vital signs: 


                                   Vital Signs











Temp  98.1 F   09/15/19 08:00


 


Pulse  125 H  09/15/19 10:00


 


Resp  41 H  09/15/19 10:00


 


BP  107/68   09/15/19 10:00


 


Pulse Ox  83 L  09/15/19 10:00








                                 Intake & Output











 09/14/19 09/15/19 09/15/19





 18:59 06:59 18:59


 


Intake Total 7505.702 1270 890


 


Output Total 1440 1075 310


 


Balance 6065.702 195 580


 


Weight  41.8 kg 


 


Intake:   


 


    


 


    .9 kvo 240  


 


  Intake, IV Titration 7215.702 820 440





  Amount   


 


    Levofloxacin 500Mg-D5w 200  100





    Pmx 500 mg In Dextrose/   





    Water 1 100ml.bag @ 100   





    mls/hr IVPB Q24H [facility] Rx#:   





    [id number]   


 


    Magnesium Sulfate-D5w Pmx 200  





    1 gm In Dextrose/Water 1   





    100ml.bag @ 66.667 mls/   





    hr IVPB Q90M [facility] Rx#:   





    [id number]   


 


    Norepinephrine 4 mg In 25.382  





    Sodium Chloride 0.9% 250   





    ml @ 0.05 MCG/KG/MIN 6.   





    481 mls/hr IV .Q24H [facility]   





    Rx#:[id number]   


 


    Piperacillin-Tazobactam 3 200 100 100





    .375 gm In Sodium   





    Chloride 0.9% 100 ml @ 25   





    mls/hr IVPB Q12HR [facility] Rx   





    #:[id number]   


 


    Propofol 1,000 mg In 110.32  





    Empty Bag 1 bag @ Titrate   





    IV .Q0M [facility] Rx#:   





    [id number]   


 


    Sodium Chloride 0.9% 1, 6480 720 240





    000 ml @ 60 mls/hr IV .   





    [id number] [facility] Rx#:[id number]   


 


  Oral  450 450


 


  Tube Feeding 50  


 


Output:   


 


  Urine 1440 1075 310


 


Other:   


 


  Voiding Method Indwelling Catheter Indwelling Catheter Indwelling Catheter








                       ABP, PAP, CO, CI - Last Documented











Arterial Blood Pressure        75/63

















- Exam





Constitutional: No acute distress, awake and alert cachectic appearance





Eyes: Anicteric sclerae, moist conjunctiva, no lid-lag, PERRLA





ENMT: Bilateral temporal wasting oral 





 NC/AT,Oropharynx clear, no erythema, exudates





Neck:Supple, FROM, no masses, or JVD, No carotid bruits; No thyromegaly





Lungs: Right Mediport subclavian in place no chest wall deformity bibasilar 

crackles and rhonchi with faint wheezes





Cardiovascular: Heart regular in rate and rhythm,  No murmurs, gallops, or rubs 

no peripheral edema





Abdominal: Soft Nontender, nom distended, no guarding, no rebound or  rigidity, 

Normoactive bowel sounds No hepatomegaly, No splenomegaly,  No palpable mass No 

abdominal wall hernia noted 





Skin: Normal temperature, tone, texture, turgor, No induration No subcutaneous 

nodules, No rash, lesions, No ulcers





Extremities:No digital cyanosis No clubbing, Pedal pulses intact and  

symmetrical Radial pulses intact and symmetrical Normal gait and station, No 

calf tenderness   


      


Neuro: Awake alert following commands no focal deficits appreciated








- Labs


CBC & Chem 7: 


                                 09/15/19 04:46





                                 09/15/19 04:46


Labs: 


                  Abnormal Lab Results - Last 24 Hours (Table)











  09/14/19 09/14/19 09/15/19 Range/Units





  11:52 12:23 04:46 


 


WBC    15.5 H  (3.8-10.6)  k/uL


 


RBC    3.23 L  (3.80-5.40)  m/uL


 


Hgb    10.5 L  (11.4-16.0)  gm/dL


 


Hct    31.6 L  (34.0-46.0)  %


 


Neutrophils #    14.9 H  (1.3-7.7)  k/uL


 


Lymphocytes #    0.1 L  (1.0-4.8)  k/uL


 


ABG pH  7.50 H    (7.35-7.45)  


 


ABG pCO2  47 H    (35-45)  mmHg


 


ABG HCO3  36 H    (21-25)  mmol/L


 


ABG Total CO2  38 H    (19-24)  mmol/L


 


ABG O2 Saturation  98.2 H    (94-97)  %


 


Sodium     (137-145)  mmol/L


 


Potassium     (3.5-5.1)  mmol/L


 


Chloride     ()  mmol/L


 


Carbon Dioxide     (22-30)  mmol/L


 


BUN     (7-17)  mg/dL


 


Creatinine     (0.52-1.04)  mg/dL


 


Glucose     (74-99)  mg/dL


 


POC Glucose (mg/dL)   123 H   (75-99)  mg/dL


 


Calcium     (8.4-10.2)  mg/dL














  09/15/19 Range/Units





  04:46 


 


WBC   (3.8-10.6)  k/uL


 


RBC   (3.80-5.40)  m/uL


 


Hgb   (11.4-16.0)  gm/dL


 


Hct   (34.0-46.0)  %


 


Neutrophils #   (1.3-7.7)  k/uL


 


Lymphocytes #   (1.0-4.8)  k/uL


 


ABG pH   (7.35-7.45)  


 


ABG pCO2   (35-45)  mmHg


 


ABG HCO3   (21-25)  mmol/L


 


ABG Total CO2   (19-24)  mmol/L


 


ABG O2 Saturation   (94-97)  %


 


Sodium  130 L  (137-145)  mmol/L


 


Potassium  3.4 L  (3.5-5.1)  mmol/L


 


Chloride  88 L  ()  mmol/L


 


Carbon Dioxide  38 H  (22-30)  mmol/L


 


BUN  6 L  (7-17)  mg/dL


 


Creatinine  0.18 L  (0.52-1.04)  mg/dL


 


Glucose  104 H  (74-99)  mg/dL


 


POC Glucose (mg/dL)   (75-99)  mg/dL


 


Calcium  8.0 L  (8.4-10.2)  mg/dL








                      Microbiology - Last 24 Hours (Table)











 09/13/19 11:50 Gram Stain - Final





 Sputum Sputum Culture - Final


 


 09/12/19 17:18 Blood Culture - Preliminary





 Blood    No Growth after 48 hours














Assessment and Plan


(1) Acute respiratory failure with hypoxia and hypercapnia


Narrative/Plan: 





* Patient extubated successfully yesterday continues on 4 L via nasal cannula


* Patient with a history of smoking has some wheezes will start prednisone


* CT of the chest showing pulmonary interstitial and airspace infiltrates po

  ssible underlying pneumonia


* We'll change breathing treatments to DuoNeb's 4 times a day and continue 

  antibiotics


Current Visit: Yes   Status: Acute   Code(s): J96.01 - ACUTE RESPIRATORY FAILURE

WITH HYPOXIA; J96.02 - ACUTE RESPIRATORY FAILURE WITH HYPERCAPNIA   SNOMED 

Code(s): 763508109


   





(2) Hyponatremia


Narrative/Plan: 





* Severe profound hyponatremia on presentation with a sodium of 130 up from 127 

  up from 117 up from 111


* Previously on hypertonic saline, nephrology following closely


* Patient continues on NS at 60 mL/h


Current Visit: Yes   Status: Acute   Code(s): E87.1 - HYPO-OSMOLALITY AND 

HYPONATREMIA   SNOMED Code(s): 67724020


   





(3) Mediastinal lymphadenopathy


Narrative/Plan: 





* CT of the chest showing extensive bronchial and subcarinal adenopathy


* Suspected to be metastatic versus lung primary


Current Visit: Yes   Status: Acute   Code(s): R59.0 - LOCALIZED ENLARGED LYMPH 

NODES   SNOMED Code(s): 27541872


   





(4) History of laryngeal cancer


Narrative/Plan: 





* Most recent treatment in January 2019


Current Visit: Yes   Status: Chronic   Code(s): Z85.21 - PERSONAL HISTORY OF 

MALIGNANT NEOPLASM OF LARYNX   SNOMED Code(s): 042050781


   





(5) Weakness


Narrative/Plan: 





* Secondary to profound hyponatremia


Current Visit: Yes   Status: Acute   Code(s): R53.1 - WEAKNESS   SNOMED Code(s):

65635013


   





(6) Severe protein-energy malnutrition


Narrative/Plan: 





* Patient started on Megace to improve her appetite


* Dietitian consult pending


Current Visit: Yes   Status: Acute   Code(s): E43 - UNSPECIFIED SEVERE PROTEIN-

CALORIE MALNUTRITION   SNOMED Code(s): 699469584


   





(7) Metabolic encephalopathy


Narrative/Plan: 





* Secondary to hyponatremia


Current Visit: Yes   Status: Resolved   Code(s): G93.41 - METABOLIC 

ENCEPHALOPATHY   SNOMED Code(s): 87146749


   





(8) Hypokalemia


Narrative/Plan: 





* Continue to monitor daily continued on potassium replacement protocol


* We'll replace and recheck


Current Visit: Yes   Status: Acute   Code(s): E87.6 - HYPOKALEMIA   SNOMED 

Code(s): 68612244


   


Plan: 








* Successfully extubated yesterday continues to be tachypneic and tachycardic


* Continue to monitor closely

## 2019-09-16 LAB
ANION GAP SERPL CALC-SCNC: 2 MMOL/L
BASOPHILS # BLD AUTO: 0 K/UL (ref 0–0.2)
BASOPHILS NFR BLD AUTO: 0 %
BUN SERPL-SCNC: 7 MG/DL (ref 7–17)
CALCIUM SPEC-MCNC: 8.1 MG/DL (ref 8.4–10.2)
CHLORIDE SERPL-SCNC: 87 MMOL/L (ref 98–107)
CO2 SERPL-SCNC: 39 MMOL/L (ref 22–30)
EOSINOPHIL # BLD AUTO: 0 K/UL (ref 0–0.7)
EOSINOPHIL NFR BLD AUTO: 0 %
ERYTHROCYTE [DISTWIDTH] IN BLOOD BY AUTOMATED COUNT: 3.13 M/UL (ref 3.8–5.4)
ERYTHROCYTE [DISTWIDTH] IN BLOOD: 13 % (ref 11.5–15.5)
GLUCOSE BLD-MCNC: 114 MG/DL (ref 75–99)
GLUCOSE BLD-MCNC: 130 MG/DL (ref 75–99)
GLUCOSE BLD-MCNC: 95 MG/DL (ref 75–99)
GLUCOSE SERPL-MCNC: 121 MG/DL (ref 74–99)
HCT VFR BLD AUTO: 30.6 % (ref 34–46)
HGB BLD-MCNC: 9.9 GM/DL (ref 11.4–16)
LYMPHOCYTES # SPEC AUTO: 0 K/UL (ref 1–4.8)
LYMPHOCYTES NFR SPEC AUTO: 0 %
MCH RBC QN AUTO: 31.6 PG (ref 25–35)
MCHC RBC AUTO-ENTMCNC: 32.3 G/DL (ref 31–37)
MCV RBC AUTO: 97.9 FL (ref 80–100)
MONOCYTES # BLD AUTO: 0.2 K/UL (ref 0–1)
MONOCYTES NFR BLD AUTO: 1 %
NEUTROPHILS # BLD AUTO: 14.1 K/UL (ref 1.3–7.7)
NEUTROPHILS NFR BLD AUTO: 98 %
PLATELET # BLD AUTO: 330 K/UL (ref 150–450)
POTASSIUM SERPL-SCNC: 4.3 MMOL/L (ref 3.5–5.1)
SODIUM SERPL-SCNC: 128 MMOL/L (ref 137–145)
WBC # BLD AUTO: 14.4 K/UL (ref 3.8–10.6)

## 2019-09-16 RX ADMIN — PANTOPRAZOLE SODIUM SCH MG: 40 TABLET, DELAYED RELEASE ORAL at 08:26

## 2019-09-16 RX ADMIN — IPRATROPIUM BROMIDE AND ALBUTEROL SULFATE PRN ML: .5; 3 SOLUTION RESPIRATORY (INHALATION) at 03:05

## 2019-09-16 RX ADMIN — HEPARIN SODIUM SCH UNIT: 5000 INJECTION, SOLUTION INTRAVENOUS; SUBCUTANEOUS at 08:26

## 2019-09-16 RX ADMIN — CEFAZOLIN SCH MLS/HR: 330 INJECTION, POWDER, FOR SOLUTION INTRAMUSCULAR; INTRAVENOUS at 12:00

## 2019-09-16 RX ADMIN — METHYLPREDNISOLONE SODIUM SUCCINATE SCH MG: 125 INJECTION, POWDER, FOR SOLUTION INTRAMUSCULAR; INTRAVENOUS at 11:59

## 2019-09-16 RX ADMIN — CEFAZOLIN SCH MLS/HR: 330 INJECTION, POWDER, FOR SOLUTION INTRAMUSCULAR; INTRAVENOUS at 07:08

## 2019-09-16 RX ADMIN — NOREPINEPHRINE BITARTRATE SCH: 1 INJECTION, SOLUTION, CONCENTRATE INTRAVENOUS at 08:31

## 2019-09-16 RX ADMIN — PIPERACILLIN AND TAZOBACTAM SCH MLS/HR: 3; .375 INJECTION, POWDER, FOR SOLUTION INTRAVENOUS at 15:41

## 2019-09-16 RX ADMIN — METHYLPREDNISOLONE SODIUM SUCCINATE SCH MG: 125 INJECTION, POWDER, FOR SOLUTION INTRAMUSCULAR; INTRAVENOUS at 06:07

## 2019-09-16 RX ADMIN — MEGESTROL ACETATE SCH MG: 40 SUSPENSION ORAL at 08:26

## 2019-09-16 RX ADMIN — IPRATROPIUM BROMIDE AND ALBUTEROL SULFATE SCH ML: .5; 3 SOLUTION RESPIRATORY (INHALATION) at 07:54

## 2019-09-16 RX ADMIN — CEFAZOLIN SCH MLS/HR: 330 INJECTION, POWDER, FOR SOLUTION INTRAMUSCULAR; INTRAVENOUS at 08:31

## 2019-09-16 RX ADMIN — METHYLPREDNISOLONE SODIUM SUCCINATE SCH MG: 125 INJECTION, POWDER, FOR SOLUTION INTRAMUSCULAR; INTRAVENOUS at 00:08

## 2019-09-16 RX ADMIN — PIPERACILLIN AND TAZOBACTAM SCH MLS/HR: 3; .375 INJECTION, POWDER, FOR SOLUTION INTRAVENOUS at 08:27

## 2019-09-16 RX ADMIN — IPRATROPIUM BROMIDE AND ALBUTEROL SULFATE SCH ML: .5; 3 SOLUTION RESPIRATORY (INHALATION) at 15:25

## 2019-09-16 RX ADMIN — LEVOFLOXACIN SCH MG: 500 TABLET, FILM COATED ORAL at 08:26

## 2019-09-16 RX ADMIN — IPRATROPIUM BROMIDE AND ALBUTEROL SULFATE PRN ML: .5; 3 SOLUTION RESPIRATORY (INHALATION) at 23:10

## 2019-09-16 RX ADMIN — HEPARIN SODIUM SCH UNIT: 5000 INJECTION, SOLUTION INTRAVENOUS; SUBCUTANEOUS at 15:42

## 2019-09-16 RX ADMIN — PIPERACILLIN AND TAZOBACTAM SCH MLS/HR: 3; .375 INJECTION, POWDER, FOR SOLUTION INTRAVENOUS at 00:08

## 2019-09-16 RX ADMIN — IPRATROPIUM BROMIDE AND ALBUTEROL SULFATE SCH ML: .5; 3 SOLUTION RESPIRATORY (INHALATION) at 19:40

## 2019-09-16 RX ADMIN — METHYLPREDNISOLONE SODIUM SUCCINATE SCH MG: 125 INJECTION, POWDER, FOR SOLUTION INTRAMUSCULAR; INTRAVENOUS at 18:08

## 2019-09-16 RX ADMIN — PANTOPRAZOLE SODIUM SCH MG: 40 TABLET, DELAYED RELEASE ORAL at 06:08

## 2019-09-16 RX ADMIN — IPRATROPIUM BROMIDE AND ALBUTEROL SULFATE SCH ML: .5; 3 SOLUTION RESPIRATORY (INHALATION) at 11:59

## 2019-09-16 RX ADMIN — HEPARIN SODIUM SCH UNIT: 5000 INJECTION, SOLUTION INTRAVENOUS; SUBCUTANEOUS at 00:09

## 2019-09-16 NOTE — P.PN
Subjective


Progress Note Date: 09/16/19 (Delayed charting seen at 12:30)


Principal diagnosis: 


Weakness and sleeping more than often than normal





Patient is a 53-year-old  female with a past medical history of lar

yngeal cancer status post chemo and radiation, and history of tobacco abuse who 

presented to the emergency department with weakness and difficulty staying 

awake.  Patient underwent treatment for laryngeal cancer in January 2019.  Since

that point in time she been having difficulty swallowing in his been tolerating 

pudding.  She's become severely cachectic and weak.  In the ER she underwent an 

extensive evaluation.  She was found to have a sodium of 111, she underwent an 

acute abdominal series which was nonspecific.  She underwent a CT of the chest 

which showed emphysema with extensive pulmonary interstitial infiltrates as well

as multiple enlarged lymph nodes.  In the emergency department she was having 

respiratory distress and appeared stridorous was subsequently admitted to the 

ICU.  Initially she was 90% on 4 L nasal cannula and was found to be 

significantly acidotic with a pH of 7.13.  She was subsequently intubated and 

placed on mechanical ventilation.  She was seen by critical care.  Due to her 

hypotension she required norepinephrine.  She was also seen by nephrology due to

her low sodiums.  She did receive 3% normal saline due to her altered mentation.

 She was started on empiric IV antibiotics for possible pneumonia of Levaquin 

and Zosyn.  She was transitioned from 3% saline to normal saline.  She was 

extubated on 9/14 without any difficulties.  She then developed significant 

respiratory distress on the morning of 9/16 and required BiPAP.





Patient seen and examined at bedside.  She complains of shortness of breath, no 

nausea, no vomiting, her throat is very dry.  She reports that she was seen in 

oncology out of [facility].  She is unsure if she's followed up in the 

last several months.  She was unable to tell me if she's had any repeat imaging.

 She initially denies any difficulty in swallowing and just states that all her 

teeth were pulled so she could only have pudding.








Objective





- Vital Signs


Vital signs: 


                                   Vital Signs











Temp  97.5 F L  09/16/19 12:00


 


Pulse  122 H  09/16/19 15:37


 


Resp  33 H  09/16/19 14:00


 


BP  100/73   09/16/19 14:00


 


Pulse Ox  90 L  09/16/19 14:00








                                 Intake & Output











 09/15/19 09/16/19 09/16/19





 18:59 06:59 18:59


 


Intake Total 2730 1690 520


 


Output Total 1130 1040 2250


 


Balance 1600 650 -1730


 


Weight 41.8 kg 43.1 kg 


 


Intake:   


 


  Intake, IV Titration 1440 1250 520





  Amount   


 


    Levofloxacin 500Mg-D5w 100  





    Pmx 500 mg In Dextrose/   





    Water 1 100ml.bag @ 100   





    mls/hr IVPB Q24H [facility] Rx#:   





    [id number]   


 


    Piperacillin-Tazobactam 3 100 100 





    .375 gm In Sodium   





    Chloride 0.9% 100 ml @ 25   





    mls/hr IVPB Q12HR [facility] Rx   





    #:[id number]   


 


    Piperacillin-Tazobactam 3 200  100





    .375 gm In Sodium   





    Chloride 0.9% 100 ml @ 25   





    mls/hr IVPB Q8HR [facility] Rx#   





    :[id number]   


 


    Sodium Chloride 0.9% 1, 800 1150 350





    000 ml @ 100 mls/hr IV .   





    Q10H [facility] Rx#:[id number]   


 


    Sodium Chloride 0.9% 1,   70





    000 ml @ 20 mls/hr IV .   





    Q24H [facility] Rx#:[id number]   


 


    Sodium Chloride 0.9% 1, 240  





    000 ml @ 60 mls/hr IV .   





    [id number] [facility] Rx#:[id number]   


 


  Oral 1290 440 


 


Output:   


 


  Urine 1130 1040 2250


 


Other:   


 


  Voiding Method Indwelling Catheter Indwelling Catheter Indwelling Catheter








                       ABP, PAP, CO, CI - Last Documented











Arterial Blood Pressure        75/63

















- Exam


General: Ill-appearing, cachectic, appears older than stated age, temporal 

wasting


Derm: warm, dry


Head: atraumatic, normocephalic, symmetric


Eyes: EOMI, no lid lag, anicteric sclera


Mouth: no lip lesion, mucus membranes dry


Cardiovascular: S1S2 reg, no murmur, positive posterior tibial pulse bilateral, 


Lungs: Course breath sounds bilateral,, + accessory muscle use, on BiPAP


Abdominal: soft,  nontender to palpation, no guarding, no appreciable 

organomegaly


Ext: + gross muscle atrophy, no edema, no contractures


Neuro:  CN II-XI grossly intact, no focal neuro deficits


Psych: Alert, oriented, appropriate affect 








- Labs


CBC & Chem 7: 


                                 09/16/19 04:28





                                 09/16/19 04:28


Labs: 


                  Abnormal Lab Results - Last 24 Hours (Table)











  09/16/19 09/16/19 Range/Units





  04:28 04:28 


 


WBC  14.4 H   (3.8-10.6)  k/uL


 


RBC  3.13 L   (3.80-5.40)  m/uL


 


Hgb  9.9 L   (11.4-16.0)  gm/dL


 


Hct  30.6 L   (34.0-46.0)  %


 


Neutrophils #  14.1 H   (1.3-7.7)  k/uL


 


Lymphocytes #  0.0 L   (1.0-4.8)  k/uL


 


Sodium   128 L  (137-145)  mmol/L


 


Chloride   87 L  ()  mmol/L


 


Carbon Dioxide   39 H  (22-30)  mmol/L


 


Creatinine   <0.15 L  (0.52-1.04)  mg/dL


 


Glucose   121 H  (74-99)  mg/dL


 


Calcium   8.1 L  (8.4-10.2)  mg/dL








                      Microbiology - Last 24 Hours (Table)











 09/12/19 17:18 Blood Culture - Preliminary





 Blood    No Growth after 72 hours














Assessment and Plan


Assessment: 


Hypovolemic hyponatremia with resultant metabolic encephalopathy


-IV fluids completed


-Nephrology recommendations


-Status post 3% normal saline





Acute on chronic hypoxic respiratory failure secondary to suspected pneumonia, 

possible gram negative


-Continue with Zosyn, Levaquin


-Pulmonary hygiene


-Follow chest x-ray until clear


-Pulmonary recommendations appreciated





Pulmonary lymphadenopathy, likely secondary to known prior laryngeal cancer


-Await records from [facility]


-We'll need urgent follow-up with her oncologist at [facility]





Severe protein calorie malnutrition with cachexia and BMI of 14.9


-Was started on Megace


-General surgery has been consulted for possible PEG tube placement


- Needs to be monitored closely for signs of refeeding syndrome


-Dietary recommendations





Anemia, likely dilutional


-Stable


-Outpatient follow-up


-Follow intermittent CBC





Hypomagnesemia, resolved


Hypokalemia, resolved





DVT prophylaxis: SCDs


Discussed with: Patient, nursing, social work


Anticipated discharge: 3-4 days


Anticipated discharge place: home vs SNF


A total of 35 minutes was spent on the care of this complex patient more than 

50% of the time was spent in counseling and care coordination.

## 2019-09-16 NOTE — US
EXAMINATION TYPE: US chest

 

DATE OF EXAM: 9/16/2019

 

COMPARISON: Chest x-ray 9/16/2019

 

CLINICAL HISTORY: Markings for thoracentesis by pulmonary staff.

 

TECHNIQUE:  Targeted ultrasound of the posterior lower left hemithorax

 

EXAM MEASUREMENTS:

 

 

Left Pleural Effusion pocket size:  2.3 cm

**  Left skin surface to fluid distance:  1.3 cm

 

 

 

 

Left side marked for possible thoracentesis outside the dept.

 

Pulmonologists are able to review the images in the patient?s EMR.  

 

 

 

Posterior right chest was not evaluated.

 

IMPRESSIONS: There is a small left pleural effusion.

## 2019-09-16 NOTE — P.PN
Subjective





Patient is seen in follow-up for hyponatremia.  Sodium level is 128 today.  She 

is currently maintained on normal saline at 100 mL an hour.  She is requiring 

more oxygen.  There concern for aspiration.





Vital signs are stable.


General: The patient appeared well nourished and normally developed. 


HEENT: Head exam is unremarkable. Neck is without jugular venous distension.


LUNGS: Breath sounds decreased.


HEART: Rate and Rhythm are regular. First and second heart sounds normal. No 

murmurs, rubs or gallops. 


ABDOMEN: Abdominal exam reveals normal bowel sounds. Non-tender and non-

distended. No evidence of peritonitis.


EXTREMITITES: No clubbing, cyanosis, or edema.





Objective





- Vital Signs


Vital signs: 


                                   Vital Signs











Temp  97.5 F L  09/16/19 08:00


 


Pulse  122 H  09/16/19 10:00


 


Resp  40 H  09/16/19 10:00


 


BP  115/83   09/16/19 10:00


 


Pulse Ox  87 L  09/16/19 10:00








                                 Intake & Output











 09/15/19 09/16/19 09/16/19





 18:59 06:59 18:59


 


Intake Total 2730 1690 400


 


Output Total 1130 1040 170


 


Balance 1600 650 230


 


Weight 41.8 kg 43.1 kg 


 


Intake:   


 


  Intake, IV Titration 1440 1250 400





  Amount   


 


    Levofloxacin 500Mg-D5w 100  





    Pmx 500 mg In Dextrose/   





    Water 1 100ml.bag @ 100   





    mls/hr IVPB Q24H [facility] Rx#:   





    [id number]   


 


    Piperacillin-Tazobactam 3 100 100 





    .375 gm In Sodium   





    Chloride 0.9% 100 ml @ 25   





    mls/hr IVPB Q12HR [facility] Rx   





    #:[id number]   


 


    Piperacillin-Tazobactam 3 200  100





    .375 gm In Sodium   





    Chloride 0.9% 100 ml @ 25   





    mls/hr IVPB Q8HR [facility] Rx#   





    :[id number]   


 


    Sodium Chloride 0.9% 1, 800 1150 300





    000 ml @ 100 mls/hr IV .   





    Q10H [facility] Rx#:[id number]   


 


    Sodium Chloride 0.9% 1, 240  





    000 ml @ 60 mls/hr IV .   





    [id number] [facility] Rx#:[id number]   


 


  Oral 1290 440 


 


Output:   


 


  Urine 1130 1040 170


 


Other:   


 


  Voiding Method Indwelling Catheter Indwelling Catheter Indwelling Catheter








                       ABP, PAP, CO, CI - Last Documented











Arterial Blood Pressure        75/63

















- Labs


CBC & Chem 7: 


                                 09/16/19 04:28





                                 09/16/19 04:28


Labs: 


                  Abnormal Lab Results - Last 24 Hours (Table)











  09/16/19 09/16/19 Range/Units





  04:28 04:28 


 


WBC  14.4 H   (3.8-10.6)  k/uL


 


RBC  3.13 L   (3.80-5.40)  m/uL


 


Hgb  9.9 L   (11.4-16.0)  gm/dL


 


Hct  30.6 L   (34.0-46.0)  %


 


Neutrophils #  14.1 H   (1.3-7.7)  k/uL


 


Lymphocytes #  0.0 L   (1.0-4.8)  k/uL


 


Sodium   128 L  (137-145)  mmol/L


 


Chloride   87 L  ()  mmol/L


 


Carbon Dioxide   39 H  (22-30)  mmol/L


 


Creatinine   <0.15 L  (0.52-1.04)  mg/dL


 


Glucose   121 H  (74-99)  mg/dL


 


Calcium   8.1 L  (8.4-10.2)  mg/dL








                      Microbiology - Last 24 Hours (Table)











 09/12/19 17:18 Blood Culture - Preliminary





 Blood    No Growth after 72 hours


 


 09/13/19 11:50 Gram Stain - Final





 Sputum Sputum Culture - Final














Assessment and Plan


Plan: 





Assessment:


1.  Hypervolemic hyponatremia initially improved with IV hydration.  Sodium 

level was 130 yesterday and is 128 today. 


2.  Mild hypokalemia from poor oral intake and steroids.  Improved.


3.  Laryngeal cancer.


4.  Possible aspiration.  May require PEG tube placement.


5.  Small left pleural effusion.





Plan:


I will decrease the rate of normal saline to 60 mL an hour.


1200 mL fluid restriction.


Lasix 20 mg IV once today.


Repeat electrolytes in the morning.

## 2019-09-16 NOTE — XR
EXAMINATION TYPE: XR chest 1V portable

 

DATE OF EXAM: 9/16/2019

 

COMPARISON: Prior chest x-ray 9/15/2019

 

HISTORY: Abnormal chest x-ray

 

TECHNIQUE: Single frontal view of the chest is obtained.

 

FINDINGS:  Right Port-A-Cath is present with the distal tip stable overlying the right atrium. Left-s
ided PICC line is in place, distal tip is overlying the right atrium. There has been interval increas
e in the density at the left lung base. No pneumothorax. Blunting of the costophrenic angles is noted
. Bilateral airspace disease is again noted, possibly underlying tumor. Heart is obscured.

 

IMPRESSION:  Pleural effusions and associated atelectasis, correlate for edema versus pneumonia, julia [email or web address]

## 2019-09-16 NOTE — PN
PROGRESS NOTE



PULMONARY/CRITICAL CARE PROGRESS NOTE:



DATE OF SERVICE:

September 16, 2019



CRITICAL CARE TIME: 31 minutes.



This is a 53-year-old female who looks much older than her stated age who was 
admitted

back on September 12, 2019.  She apparently came in with a number of issues 
including

mental status changes from acute metabolic encephalopathy and hyponatremia with 
a

presenting sodium of 111, as well as acute hypoxemic and hypercapnic respiratory

failure secondary to possible pneumonia.  The patient was intubated on the 13th 
and

extubated subsequently on the 14th.  The patient also has a history of laryngeal

carcinoma, status post chemoradiation, with her last treatment being in January 2019,

severe protein calorie malnutrition, hypovolemic hyponatremia, previous tobacco

dependence, anemia of chronic disease, electrolyte disturbance and contraction

metabolic alkalosis.  Anyway, the patient is doing better currently.  As I said 
she was

admitted on the 12th.  She was intubated for a brief period of time about a day 
or so

and extubated on the 14th.  The patient's sodium has improved.  Currently, she 
is on 15

L high flow.  She will sometimes be on BiPAP at 12 and 4 and 60%.  Her IV saline
at 100

mL an hour.



The patient was not real clear about who her physician was.  She apparently sees

somebody in [place].



Anyway, the patient is doing better since being here in the intensive care unit.



She was seen by my partner yesterday.  He recommended continuing ICU monitoring

including antibiotics which were Zosyn and Levaquin, nutritional support,

bronchodilators, GI and DVT prophylaxis as well as some other issues.



PHYSICAL EXAMINATION:

VITAL SIGNS: Current vital signs are reviewed. Temperature is 97.5, heart rate 
118,

respiratory rate is in the high 20s, blood pressure 105/79, mean 87, saturations
are

90%.

GENERAL: She appears in no acute distress.  She is mildly tachypneic.  No 
audible

wheezing.  No use of accessory muscles.  She is very cachectic appearing.

HEENT: Examination is grossly unremarkable.

NECK:  Supple.  Full range of motion.  No adenopathy or thyromegaly.  No neck 
vein

distention.

CARDIOVASCULAR: Examination reveals regular rhythm and rate.  Heart rate about 
100

beats per minute.  It is regular.

LUNGS:  Reveal a few scattered coarse rhonchi.  No wheezes.  A few scattered 
crackles

are appreciated.  Breath sounds are diminished throughout.

ABDOMEN:  Soft, Bowel sounds are heard.

EXTREMITIES: Are intact.  No cyanosis, clubbing, or edema.

SKIN: Without rash.

NEUROLOGIC: Examination is brief but non focal.



LAB DATA:

Lab data is reviewed.  White count 14.4, hemoglobin 9.9, hematocrit 30.6, 
platelet

count 330,000.  Sodium 128, potassium 4.3, chloride 87, CO2 of 39.  Anion gap is
2. BUN

and creatinine were 7 and 0.15. Calcium 8.1.



MICROBIOLOGY:

Sputum and blood are both negative.



Chest x-ray shows bilateral pleural effusions, right greater than left.  There 
is also

bibasilar airspace disease.  We did an ultrasound of the chest and it showed 
very small

right-sided effusion, not worth doing a thoracentesis on.



MEDICATIONS:

Medications are reviewed.  She is currently on subcu heparin, Norco, updrafts 
with

DuoNeb, Levaquin p.o., magnesium replacement, Megace, Solu-Medrol, morphine, 
Narcan,

Protonix, Zosyn, potassium replacement and her IV.



ASSESSMENT:

1. Acute metabolic encephalopathy, in large part related to the patient's

    hyponatremia, which was determined to be hypovolemic hyponatremia. Current 
sodium

    is 128.

2. Acute hypoxemic hypercapnic respiratory failure secondary to effusion and 
possible

    underlying pneumonia, status post intubation on the 13th and extubation 
14rh.

3. Ongoing hypoxemic respiratory failure, requiring both high-flow oxygen 
therapy and

    BiPAP therapy.

4. Severe hyponatremia, improved.

5. History of laryngeal carcinoma, status post chemoradiation, possibly 
metastatic,

    with her last chemoradiation treatments in January of this year.

6. Severe protein-calorie malnutrition and inanation.

7. History of previous tobacco dependence.

8. Anemia of chronic disease.

9. Electrolyte disturbance including hypomagnesemia.

10.Contraction metabolic alkalosis.

11.Chronic anorexia cachexia syndrome.

12.Significantly poor dentition with caries.



PLAN:

The patient is doing a bit better.  We will continue to follow closely.  No 
additional

recommendations are made.  Additional recommendations and suggestions are 
forthcoming.

We will continue with antibiotics.  She will use BiPAP as needed.  Overall 
prognosis

remains poor.  The CT scan of the chest showed no evidence of pulmonary 
embolism, but

extensive bronchial adenopathy raising the suspicion of either reactive 
adenopathy from

pneumonia and/or metastatic cancer.  There are also changes of COPD.  We will 
continue

to follow.  Prognosis is guarded.



Critical care time is 31 minutes.





LIZETT / IJN: [id number] / Job#: [id number]

MTDD

## 2019-09-16 NOTE — P.GSCN
History of Present Illness


Consult date: 09/16/19


Reason for Consult: 





dysphagia, PEG tube placement





Requesting physician: Romina Brown


History of present illness: 





CHIEF COMPLAINT: Dysphagia





HISTORY OF PRESENT ILLNESS: 53 year old female with a history of laryngeal 

carcinoma who was admitted to the hospital with respiratory failure.  Patient is

currently on a BiPAP. She has been having issues with dysphagia. She was unable 

to keep BiPap off long enough to complete a swallow evaluation. General surgery 

was consulted for PEG tube placement





PAST MEDICAL HISTORY: 


See list.





PAST SURGICAL HISTORY: 


See list.





SOCIAL HISTORY: No illicit drug use.  





REVIEW OF SYSTEMS: 


CONSTITUTIONAL: Denies fever or chills.


HEENT: Denies blurred vision, vision changes, or eye pain. Denies hemoptysis 


CARDIOVASCULAR: Denies chest pain or pressure.


RESPIRATORY: Reports occasional shortness of breath. 


GASTROINTESTINAL: Refer to HPI for pertinent findings


HEMATOLOGIC: Denies bleeding disorders.


GENITOURINARY:  Denies any blood in urine.


SKIN: Denies pruitis. Denies rash.





PHYSICAL EXAM: 


VITAL SIGNS: Reviewed.


GENERAL: Well-developed in no acute distress-on BiPap. 


HEENT:  No sclera icterus. Extraocular movements grossly intact.  Moist buccal 

mucosa. Head is atraumatic, normocephalic. 


ABDOMEN:  Soft.  Nondistended. Nontender. 


NEUROLOGIC: Alert and oriented. Cranial nerves II through XII grossly intact.





LABORATORY DATA:


WBC 14.4 Hemoglobin 9.9





ASSESSMENT: 


1.  Dysphagia


2.  History of laryngeal carcinoma


3.  Severe protein calorie malnutrition





PLAN: 


NPO. Patient to undergo PEG tube placement tomorrow with Dr. Wright.





Nurse practitioner note has been reviewed by physician. Signing provider agrees 

with the documented findings, assessment, and plan of care. 








Past Medical History


Past Medical History: Cancer


Additional Past Medical History / Comment(s): Laryngeal cancer: radiation and 

chemo completed in 1/2019; ectopic pregnancy


History of Any Multi-Drug Resistant Organisms: None Reported


Past Surgical History: Tubal Ligation


Past Anesthesia/Blood Transfusion Reactions: No Reported Reaction


Past Psychological History: No Psychological Hx Reported


Smoking Status: Former smoker


Past Alcohol Use History: None Reported


Past Drug Use History: None Reported





- Past Family History


  ** Mother


Family Medical History: Coronary Artery Disease (CAD), Diabetes Mellitus





  ** Father


Family Medical History: CVA/TIA, Myocardial Infarction (MI)


Additional Family Medical History / Comment(s): Emphsema, passed due to MI





Medications and Allergies


                                Home Medications











 Medication  Instructions  Recorded  Confirmed  Type


 


Ibuprofen [Advil] 200 mg PO Q8HR PRN 09/12/19 09/12/19 History


 


guaiFENesin [Mucinex] 600 mg PO BID PRN 09/12/19 09/12/19 History








                                    Allergies











Allergy/AdvReac Type Severity Reaction Status Date / Time


 


Beef Containing Products AdvReac  No reaction Verified 09/14/19 16:39





[Beef]     


 


Fish Containing Products AdvReac  No reaction Verified 09/14/19 16:39





[Fish]     


 


Pork/Porcine Containing AdvReac  No reaction Verified 09/14/19 16:39





Products     





[Pork]     














Surgical - Exam


                                   Vital Signs











Temp Pulse Resp BP Pulse Ox


 


 98.8 F   115 H  17   121/91   79 L


 


 09/12/19 17:02  09/12/19 17:02  09/12/19 17:02  09/12/19 17:02  09/12/19 17:02














Results





- Labs





                                 09/16/19 04:28





                                 09/16/19 04:28


                  Abnormal Lab Results - Last 24 Hours (Table)











  09/16/19 09/16/19 Range/Units





  04:28 04:28 


 


WBC  14.4 H   (3.8-10.6)  k/uL


 


RBC  3.13 L   (3.80-5.40)  m/uL


 


Hgb  9.9 L   (11.4-16.0)  gm/dL


 


Hct  30.6 L   (34.0-46.0)  %


 


Neutrophils #  14.1 H   (1.3-7.7)  k/uL


 


Lymphocytes #  0.0 L   (1.0-4.8)  k/uL


 


Sodium   128 L  (137-145)  mmol/L


 


Chloride   87 L  ()  mmol/L


 


Carbon Dioxide   39 H  (22-30)  mmol/L


 


Creatinine   <0.15 L  (0.52-1.04)  mg/dL


 


Glucose   121 H  (74-99)  mg/dL


 


Calcium   8.1 L  (8.4-10.2)  mg/dL








                      Microbiology - Last 24 Hours (Table)











 09/12/19 17:18 Blood Culture - Preliminary





 Blood    No Growth after 72 hours








                                 Diabetes panel











  09/16/19 Range/Units





  04:28 


 


Sodium  128 L  (137-145)  mmol/L


 


Potassium  4.3  (3.5-5.1)  mmol/L


 


Chloride  87 L  ()  mmol/L


 


Carbon Dioxide  39 H  (22-30)  mmol/L


 


BUN  7  (7-17)  mg/dL


 


Creatinine  <0.15 L  (0.52-1.04)  mg/dL


 


Glucose  121 H  (74-99)  mg/dL


 


Calcium  8.1 L  (8.4-10.2)  mg/dL








                                  Calcium panel











  09/16/19 Range/Units





  04:28 


 


Calcium  8.1 L  (8.4-10.2)  mg/dL








                                 Pituitary panel











  09/16/19 Range/Units





  04:28 


 


Sodium  128 L  (137-145)  mmol/L


 


Potassium  4.3  (3.5-5.1)  mmol/L


 


Chloride  87 L  ()  mmol/L


 


Carbon Dioxide  39 H  (22-30)  mmol/L


 


BUN  7  (7-17)  mg/dL


 


Creatinine  <0.15 L  (0.52-1.04)  mg/dL


 


Glucose  121 H  (74-99)  mg/dL


 


Calcium  8.1 L  (8.4-10.2)  mg/dL








                                  Adrenal panel











  09/16/19 Range/Units





  04:28 


 


Sodium  128 L  (137-145)  mmol/L


 


Potassium  4.3  (3.5-5.1)  mmol/L


 


Chloride  87 L  ()  mmol/L


 


Carbon Dioxide  39 H  (22-30)  mmol/L


 


BUN  7  (7-17)  mg/dL


 


Creatinine  <0.15 L  (0.52-1.04)  mg/dL


 


Glucose  121 H  (74-99)  mg/dL


 


Calcium  8.1 L  (8.4-10.2)  mg/dL

## 2019-09-17 LAB
ALBUMIN SERPL-MCNC: 2.6 G/DL (ref 3.5–5)
ALP SERPL-CCNC: 82 U/L (ref 38–126)
ALT SERPL-CCNC: 25 U/L (ref 9–52)
ANION GAP SERPL CALC-SCNC: 1 MMOL/L
ANION GAP SERPL CALC-SCNC: 4 MMOL/L
AST SERPL-CCNC: 17 U/L (ref 14–36)
BUN SERPL-SCNC: 14 MG/DL (ref 7–17)
BUN SERPL-SCNC: 14 MG/DL (ref 7–17)
CALCIUM SPEC-MCNC: 8.7 MG/DL (ref 8.4–10.2)
CALCIUM SPEC-MCNC: 8.8 MG/DL (ref 8.4–10.2)
CHLORIDE SERPL-SCNC: 84 MMOL/L (ref 98–107)
CHLORIDE SERPL-SCNC: 85 MMOL/L (ref 98–107)
CO2 SERPL-SCNC: 45 MMOL/L (ref 22–30)
CO2 SERPL-SCNC: 46 MMOL/L (ref 22–30)
ERYTHROCYTE [DISTWIDTH] IN BLOOD BY AUTOMATED COUNT: 3.13 M/UL (ref 3.8–5.4)
ERYTHROCYTE [DISTWIDTH] IN BLOOD: 13 % (ref 11.5–15.5)
GLUCOSE BLD-MCNC: 131 MG/DL (ref 75–99)
GLUCOSE BLD-MCNC: 140 MG/DL (ref 75–99)
GLUCOSE BLD-MCNC: 84 MG/DL (ref 75–99)
GLUCOSE BLD-MCNC: 97 MG/DL (ref 75–99)
GLUCOSE SERPL-MCNC: 107 MG/DL (ref 74–99)
GLUCOSE SERPL-MCNC: 107 MG/DL (ref 74–99)
HCT VFR BLD AUTO: 30.5 % (ref 34–46)
HGB BLD-MCNC: 10.1 GM/DL (ref 11.4–16)
MAGNESIUM SPEC-SCNC: 1.8 MG/DL (ref 1.6–2.3)
MAGNESIUM SPEC-SCNC: 2.3 MG/DL (ref 1.6–2.3)
MCH RBC QN AUTO: 32.3 PG (ref 25–35)
MCHC RBC AUTO-ENTMCNC: 33.1 G/DL (ref 31–37)
MCV RBC AUTO: 97.5 FL (ref 80–100)
PLATELET # BLD AUTO: 303 K/UL (ref 150–450)
POTASSIUM SERPL-SCNC: 3.7 MMOL/L (ref 3.5–5.1)
POTASSIUM SERPL-SCNC: 3.7 MMOL/L (ref 3.5–5.1)
PROT SERPL-MCNC: 5.3 G/DL (ref 6.3–8.2)
SODIUM SERPL-SCNC: 132 MMOL/L (ref 137–145)
SODIUM SERPL-SCNC: 133 MMOL/L (ref 137–145)
TRIGL SERPL-MCNC: 92 MG/DL (ref ?–150)
WBC # BLD AUTO: 10 K/UL (ref 3.8–10.6)

## 2019-09-17 PROCEDURE — 3E0436Z INTRODUCTION OF NUTRITIONAL SUBSTANCE INTO CENTRAL VEIN, PERCUTANEOUS APPROACH: ICD-10-PCS

## 2019-09-17 RX ADMIN — IPRATROPIUM BROMIDE AND ALBUTEROL SULFATE SCH ML: .5; 3 SOLUTION RESPIRATORY (INHALATION) at 07:35

## 2019-09-17 RX ADMIN — IPRATROPIUM BROMIDE AND ALBUTEROL SULFATE SCH ML: .5; 3 SOLUTION RESPIRATORY (INHALATION) at 19:05

## 2019-09-17 RX ADMIN — IPRATROPIUM BROMIDE AND ALBUTEROL SULFATE SCH ML: .5; 3 SOLUTION RESPIRATORY (INHALATION) at 11:23

## 2019-09-17 RX ADMIN — PIPERACILLIN AND TAZOBACTAM SCH MLS/HR: 3; .375 INJECTION, POWDER, FOR SOLUTION INTRAVENOUS at 08:17

## 2019-09-17 RX ADMIN — PIPERACILLIN AND TAZOBACTAM SCH MLS/HR: 3; .375 INJECTION, POWDER, FOR SOLUTION INTRAVENOUS at 23:28

## 2019-09-17 RX ADMIN — METHYLPREDNISOLONE SODIUM SUCCINATE SCH MG: 125 INJECTION, POWDER, FOR SOLUTION INTRAMUSCULAR; INTRAVENOUS at 18:10

## 2019-09-17 RX ADMIN — METHYLPREDNISOLONE SODIUM SUCCINATE SCH MG: 125 INJECTION, POWDER, FOR SOLUTION INTRAMUSCULAR; INTRAVENOUS at 05:46

## 2019-09-17 RX ADMIN — METHYLPREDNISOLONE SODIUM SUCCINATE SCH MG: 125 INJECTION, POWDER, FOR SOLUTION INTRAMUSCULAR; INTRAVENOUS at 23:27

## 2019-09-17 RX ADMIN — NOREPINEPHRINE BITARTRATE SCH: 1 INJECTION, SOLUTION, CONCENTRATE INTRAVENOUS at 02:09

## 2019-09-17 RX ADMIN — MAGNESIUM SULFATE IN DEXTROSE SCH MLS/HR: 10 INJECTION, SOLUTION INTRAVENOUS at 06:55

## 2019-09-17 RX ADMIN — METHYLPREDNISOLONE SODIUM SUCCINATE SCH MG: 125 INJECTION, POWDER, FOR SOLUTION INTRAMUSCULAR; INTRAVENOUS at 12:26

## 2019-09-17 RX ADMIN — INSULIN ASPART SCH UNIT: 100 INJECTION, SOLUTION INTRAVENOUS; SUBCUTANEOUS at 18:27

## 2019-09-17 RX ADMIN — MORPHINE SULFATE PRN MG: 4 INJECTION, SOLUTION INTRAMUSCULAR; INTRAVENOUS at 23:27

## 2019-09-17 RX ADMIN — HEPARIN SODIUM SCH UNIT: 5000 INJECTION, SOLUTION INTRAVENOUS; SUBCUTANEOUS at 00:12

## 2019-09-17 RX ADMIN — METHYLPREDNISOLONE SODIUM SUCCINATE SCH MG: 125 INJECTION, POWDER, FOR SOLUTION INTRAMUSCULAR; INTRAVENOUS at 00:13

## 2019-09-17 RX ADMIN — PANTOPRAZOLE SODIUM SCH MG: 40 INJECTION, POWDER, FOR SOLUTION INTRAVENOUS at 09:30

## 2019-09-17 RX ADMIN — PIPERACILLIN AND TAZOBACTAM SCH MLS/HR: 3; .375 INJECTION, POWDER, FOR SOLUTION INTRAVENOUS at 16:19

## 2019-09-17 RX ADMIN — POTASSIUM CHLORIDE SCH MLS/HR: 7.46 INJECTION, SOLUTION INTRAVENOUS at 06:55

## 2019-09-17 RX ADMIN — PANTOPRAZOLE SODIUM SCH MG: 40 INJECTION, POWDER, FOR SOLUTION INTRAVENOUS at 06:55

## 2019-09-17 RX ADMIN — INSULIN ASPART SCH UNIT: 100 INJECTION, SOLUTION INTRAVENOUS; SUBCUTANEOUS at 23:28

## 2019-09-17 RX ADMIN — LEVOFLOXACIN SCH MLS/HR: 5 INJECTION, SOLUTION INTRAVENOUS at 09:31

## 2019-09-17 RX ADMIN — PIPERACILLIN AND TAZOBACTAM SCH MLS/HR: 3; .375 INJECTION, POWDER, FOR SOLUTION INTRAVENOUS at 00:13

## 2019-09-17 RX ADMIN — HEPARIN SODIUM SCH UNIT: 5000 INJECTION, SOLUTION INTRAVENOUS; SUBCUTANEOUS at 16:20

## 2019-09-17 RX ADMIN — IPRATROPIUM BROMIDE AND ALBUTEROL SULFATE SCH ML: .5; 3 SOLUTION RESPIRATORY (INHALATION) at 15:10

## 2019-09-17 RX ADMIN — LEVOFLOXACIN SCH: 500 TABLET, FILM COATED ORAL at 08:53

## 2019-09-17 RX ADMIN — HEPARIN SODIUM SCH UNIT: 5000 INJECTION, SOLUTION INTRAVENOUS; SUBCUTANEOUS at 23:27

## 2019-09-17 RX ADMIN — MAGNESIUM SULFATE IN DEXTROSE SCH MLS/HR: 10 INJECTION, SOLUTION INTRAVENOUS at 05:46

## 2019-09-17 RX ADMIN — IPRATROPIUM BROMIDE AND ALBUTEROL SULFATE PRN ML: .5; 3 SOLUTION RESPIRATORY (INHALATION) at 03:28

## 2019-09-17 RX ADMIN — CEFAZOLIN SCH MLS/HR: 330 INJECTION, POWDER, FOR SOLUTION INTRAMUSCULAR; INTRAVENOUS at 18:16

## 2019-09-17 RX ADMIN — POTASSIUM CHLORIDE SCH MLS/HR: 7.46 INJECTION, SOLUTION INTRAVENOUS at 05:47

## 2019-09-17 RX ADMIN — MEGESTROL ACETATE SCH: 40 SUSPENSION ORAL at 08:52

## 2019-09-17 RX ADMIN — HEPARIN SODIUM SCH UNIT: 5000 INJECTION, SOLUTION INTRAVENOUS; SUBCUTANEOUS at 09:30

## 2019-09-17 NOTE — P.PN
Subjective





Patient is seen in follow-up for hyponatremia.  Sodium level is 133 today.  She 

is off IVFs.  Not able to tolerate oral intake due to concern for aspiration.





Vital signs are stable.


General: The patient appeared well nourished and normally developed. 


HEENT: Head exam is unremarkable. Neck is without jugular venous distension.


LUNGS: Breath sounds decreased.


HEART: Rate and Rhythm are regular. First and second heart sounds normal. No 

murmurs, rubs or gallops. 


ABDOMEN: Abdominal exam reveals normal bowel sounds. Non-tender and 

non-distended. No evidence of peritonitis.


EXTREMITITES: No clubbing, cyanosis, or edema.





Objective





- Vital Signs


Vital signs: 


                                   Vital Signs











Temp  97.5 F L  09/17/19 08:00


 


Pulse  105 H  09/17/19 11:24


 


Resp  26 H  09/17/19 11:00


 


BP  96/71   09/17/19 11:00


 


Pulse Ox  99   09/17/19 11:00








                                 Intake & Output











 09/16/19 09/17/19 09/17/19





 18:59 06:59 18:59


 


Intake Total 700 240 660


 


Output Total 3150 510 175


 


Balance -2450 -270 485


 


Weight  48.2 kg 48.2 kg


 


Intake:   


 


  IV  220 460


 


    .9 kvo  220 60


 


    Magnesium Sulfate-D5w Pmx   200





    1 gm In Dextrose/Water 1   





    100ml.bag @ 100 mls/hr   





    IVPB Q1H [facility] Rx#:   





    [id number]   


 


    Potassium Chloride 10 meq   200





    In Water For Injection 1   





    100ml.bag @ 100 mls/hr   





    IVPB Q1H [facility] Rx#:   





    [id number]   


 


  Intake, IV Titration 700 20 200





  Amount   


 


    Levofloxacin 500Mg-D5w   100





    Pmx 500 mg In Dextrose/   





    Water 1 100ml.bag @ 100   





    mls/hr IVPB Q24H [facility] Rx#:   





    [id number]   


 


    Piperacillin-Tazobactam 3 200  100





    .375 gm In Sodium   





    Chloride 0.9% 100 ml @ 25   





    mls/hr IVPB Q8HR [facility] Rx#   





    [id number]   


 


    Sodium Chloride 0.9% 1, 350  





    000 ml @ 100 mls/hr IV .   





    Q10H [facility] Rx#:[id number]   


 


    Sodium Chloride 0.9% 1, 150 20 





    000 ml @ 20 mls/hr IV .   





    Q24H [facility] Rx#:[id number]   


 


Output:   


 


  Urine 3150 510 175


 


Other:   


 


  Voiding Method Indwelling Catheter Indwelling Catheter 








                       ABP, PAP, CO, CI - Last Documented











Arterial Blood Pressure        75/63

















- Labs


CBC & Chem 7: 


                                 09/17/19 04:12





                                 09/17/19 04:12


Labs: 


                  Abnormal Lab Results - Last 24 Hours (Table)











  09/16/19 09/16/19 09/17/19 Range/Units





  17:55 23:47 04:12 


 


RBC    3.13 L  (3.80-5.40)  m/uL


 


Hgb    10.1 L  (11.4-16.0)  gm/dL


 


Hct    30.5 L  (34.0-46.0)  %


 


Sodium     (137-145)  mmol/L


 


Chloride     ()  mmol/L


 


Carbon Dioxide     (22-30)  mmol/L


 


Creatinine     (0.52-1.04)  mg/dL


 


Glucose     (74-99)  mg/dL


 


POC Glucose (mg/dL)  130 H  114 H   (75-99)  mg/dL














  09/17/19 Range/Units





  04:12 


 


RBC   (3.80-5.40)  m/uL


 


Hgb   (11.4-16.0)  gm/dL


 


Hct   (34.0-46.0)  %


 


Sodium  133 L  (137-145)  mmol/L


 


Chloride  84 L  ()  mmol/L


 


Carbon Dioxide  45 H*  (22-30)  mmol/L


 


Creatinine  0.22 L  (0.52-1.04)  mg/dL


 


Glucose  107 H  (74-99)  mg/dL


 


POC Glucose (mg/dL)   (75-99)  mg/dL








                      Microbiology - Last 24 Hours (Table)











 09/12/19 17:18 Blood Culture - Preliminary





 Blood    No Growth after 96 hours














Assessment and Plan


Plan: 





Assessment:


1.  Hypovolemic hyponatremia initially improved with IV hydration.  Sodium level

was 133 today. 


2.  Mild hypokalemia from poor oral intake and steroids.  Improved.


3.  Laryngeal cancer.


4.  Possible aspiration.  Refused PEG tube. Potential dobhoff placement today.


5.  Small left pleural effusion.


6.  Metabolic alkalosis.  This can be due to Lasix given yesterday and also 

compensatory for respiratory acidosis.





Plan:


Swallow eval pending.


Tube feeding may be started today.


I will give her 2 doses of Diamox today.

## 2019-09-17 NOTE — PN
PROGRESS NOTE



DATE OF SERVICE:

09/17/2019



This is a 53-year-old female who was admitted back on September 12, 2019.  She came in

with a number of issues including mental status changes from acute metabolic

encephalopathy, as well as hyponatremia with a presenting sodium of 111 and as well as

acute hypoxemic and hypercapnic respiratory failure secondary to pneumonia.  The

patient was intubated for a brief period of time and extubated on 9/14.  The patient

has a history of laryngeal carcinoma, status post chemoradiation.  Her last treatments

were in January 2019.  In addition, she has severe protein calorie malnutrition,

hypovolemic hyponatremia, previous tobacco dependence, anemia of chronic disease,

electrolyte disturbance and contraction metabolic alkalosis.  The patient was to have a

PEG tube today.  Apparently she is refusing and a Dobbhoff tube will be placed instead.

She is currently on BiPAP of 14 and 6.  On 100%, her saturations were excellent.  I

have asked him to start titrating the FiO2 down and accept saturations in the mixed

80s.  She is a patient with chronic hypercapnic respiratory failure and her baseline

PACO2 is in the 70 range.  She is getting saline at KVO.  We are going to change her

p.o. Levaquin to IV Levaquin.  We are going to recommend TPN if the Dobbhoff tube

cannot be placed or is refused.  Currently, she is n.p.o.  We are going to wean the

FiO2 as mentioned above.



Current vital signs are reviewed.  Temperature 97.5, heart rate 100, respiratory rate

in the high 20s, low 30s, blood pressure 113/84 mean 93, saturations are in the mid

90s.  Appears in no acute distress.  She is mildly tachypneic.

HEENT: Examination is grossly unremarkable.  BiPAP mask in place.

NECK:  Supple.  Full range of motion.  No adenopathy.  Neck veins are flat.

CARDIOVASCULAR: Examination reveals mild tachycardia.  Heart rate about 103 beats per

minute.  S1, S2 normal.  Heart rate is regular.

LUNGS:  Reveal coarse diffuse bilateral rhonchi.  Some crackles.  Expiratory wheezes.

Breath sounds are diminished throughout.

ABDOMEN:  Soft.  Bowel sounds are heard.  No masses.

EXTREMITIES:  Intact.  No cyanosis, clubbing, or edema.

SKIN: Without rash.

NEUROLOGIC:  Examination is brief but nonfocal.  She is very hard of hearing.



CHEST X-RAY:

Was done this morning.  Compared to yesterday's x-ray, she does have minimal

improvement in the oxygenation and aeration.  She does have some improvement in the

aeration in the upper lobes.



Microbiologic studies are thus far negative.



LABS:

Reviewed.  White count 10, hemoglobin 10.1, hematocrit 30.5, platelet count 303,000.

Sodium 133, potassium 3.7, chloride 94, CO2 of 45. Anion gap is 4. BUN and creatinine

were 14 and 0.22.



Medications are reviewed.  They all appeared to be appropriate.  We did switch the

Levaquin to IV.  She is also getting Zosyn.  The rest of her medications are

appropriate.  They were evaluated accordingly.



ASSESSMENT:

1. Acute metabolic encephalopathy, in large part related to the patient's

    hyponatremia, which was determined to be hypovolemic hyponatremia, which has

    significantly improved.

2. Acute hypoxemic and hypercapnic respiratory failure secondary to pneumonia, status

    post intubation on 9/13 and subsequent extubation on 9/14.

3. Ongoing hypoxemic respiratory failure, requiring BiPAP therapy with high FiO2.

4. Severe hypovolemic hyponatremia, much improved.

5. History of laryngeal carcinoma, status post chemo radiation, possibly metastatic

    with her last chemo radiation treatments in January of 2019.

6. Severe protein/calorie malnutrition and inanition.

7. History of previous tobacco dependence.

8. Anemia of chronic disease.

9. Electrolyte disturbances including hypomagnesemia.

10.Contraction metabolic alkalosis.

11.Anorexia cachexia syndrome, chronic.

12.Significantly poor dentition with dental caries.



PLAN:

The patient will have a Dobbhoff tube placed.  She refused a PEG tube.  Will switch the

Levaquin from p.o. to IV.  If she cannot have a Dobbhoff placed, will put her on TPN.

Currently, she will be n.p.o.  Head of bed elevated at all times.  Aspiration

precautions.  She is getting 0.9 IV KVO.  I have asked the nurses to titrate down the

FiO2 so that her saturations are in the mid 80s and no higher than about 90%.  Will

continue to follow.  Prognosis is very guarded.  Code status has been addressed, but

apparently she maybe does understand what we are saying to her.





MMODL / IJN: [id number] / Job#: [id number]

## 2019-09-17 NOTE — XR
EXAMINATION TYPE: XR chest 1V portable

 

DATE OF EXAM: 9/17/2019

 

COMPARISON: Prior chest x-ray dated 9/16/2019

 

HISTORY: Abnormal chest x-ray

 

TECHNIQUE: Single frontal view of the chest is obtained.

 

FINDINGS:  Patient is rotated. Findings are similar. Central venous catheters are unchanged. There is
 some improvement in aeration in the left upper lobe and possibly right upper lobe. Bibasilar increas
ed density persists. Heart size is likely stable. There are overlying cardiac leads.

 

IMPRESSION:  Suspect some improvement in aeration in the upper lobes.

## 2019-09-17 NOTE — P.PN
Subjective


Progress Note Date: 19


Principal diagnosis: 


Weakness and sleeping more than often than normal





Patient is a 53-year-old  female with a past medical history of 

laryngeal cancer status post chemo and radiation, and history of tobacco abuse 

who presented to the emergency department with weakness and difficulty staying 

awake.  Patient underwent treatment for laryngeal cancer in 2019.  Since

that point in time she been having difficulty swallowing in his been tolerating 

pudding.  She's become severely cachectic and weak.  In the ER she underwent an 

extensive evaluation.  She was found to have a sodium of 111, she underwent an 

acute abdominal series which was nonspecific.  She underwent a CT of the chest 

which showed emphysema with extensive pulmonary interstitial infiltrates as well

as multiple enlarged lymph nodes.  In the emergency department she was having 

respiratory distress and appeared stridorous was subsequently admitted to the 

ICU.  Initially she was 90% on 4 L nasal cannula and was found to be 

significantly acidotic with a pH of 7.13.  She was subsequently intubated and 

placed on mechanical ventilation.  She was seen by critical care.  Due to her 

hypotension she required norepinephrine.  She was also seen by nephrology due to

her low sodiums.  She did receive 3% normal saline due to her altered mentation.

 She was started on empiric IV antibiotics for possible pneumonia of Levaquin 

and Zosyn.  She was transitioned from 3% saline to normal saline.  She was ex

tubated on  without any difficulties.  She then developed significant 

respiratory distress on the morning of  and required BiPAP. Patient amenable

for -Leandro. 





Patient seen and examined at bedside.  Feeling angry and frustrated with current

functional level. Has a cough and feels that she can't clear the back of her 

throat. Feeling anxious. Wants to be able to try swallow study before PEG tube, 

will for -leandro. NO nausea, vomiting, abdominal pain.   





Objective





- Vital Signs


Vital signs: 


                                   Vital Signs











Temp  98.3 F   19 12:00


 


Pulse  101 H  19 13:00


 


Resp  28 H  19 13:00


 


BP  91/66   19 13:00


 


Pulse Ox  89 L  19 13:00








                                 Intake & Output











 19





 18:59 06:59 18:59


 


Intake Total 700 240 680


 


Output Total 3150 510 215


 


Balance -2450 -270 465


 


Weight  48.2 kg 48.2 kg


 


Intake:   


 


  IV  220 480


 


    .9 kvo  220 80


 


    Magnesium Sulfate-D5w Pmx   200





    1 gm In Dextrose/Water 1   





    100ml.bag @ 100 mls/hr   





    IVPB Q1H [facility] Rx#:   





    [id number]   


 


    Potassium Chloride 10 meq   200





    In Water For Injection 1   





    100ml.bag @ 100 mls/hr   





    IVPB Q1H [facility] Rx#:   





    [id number]   


 


  Intake, IV Titration 700 20 200





  Amount   


 


    Levofloxacin 500Mg-D5w   100





    Pmx 500 mg In Dextrose/   





    Water 1 100ml.bag @ 100   





    mls/hr IVPB Q24H [facility] Rx#:   





    [id number]   


 


    Piperacillin-Tazobactam 3 200  100





    .375 gm In Sodium   





    Chloride 0.9% 100 ml @ 25   





    mls/hr IVPB Q8HR [facility] Rx#   





    :[id number]   


 


    Sodium Chloride 0.9% 1, 350  





    000 ml @ 100 mls/hr IV .   





    Q10H [facility] Rx#:[id number]   


 


    Sodium Chloride 0.9% 1, 150 20 





    000 ml @ 20 mls/hr IV .   





    Q24H [facility] Rx#:[id number]   


 


Output:   


 


  Urine 3150 510 215


 


Other:   


 


  Voiding Method Indwelling Catheter Indwelling Catheter Indwelling Catheter








                       ABP, PAP, CO, CI - Last Documented











Arterial Blood Pressure        75/63

















- Exam


General: Ill-appearing, cachectic, appears older than stated age, temporal w

asting


Derm: warm, dry


Head: atraumatic, normocephalic, symmetric


Eyes: EOMI, no lid lag, anicteric sclera


Mouth: no lip lesion, mucus membranes dry


Cardiovascular: S1S2 reg, no murmur, positive posterior tibial pulse bilateral, 


Lungs: Ronchi bilateral, + accessory muscle use, on BiPAP


Abdominal: soft,  nontender to palpation, no guarding, no appreciable 

organomegaly


Ext: + gross muscle atrophy, no edema, no contractures


Neuro:  CN II-XI grossly intact, no focal neuro deficits


Psych: Alert, oriented, appropriate affect 








- Labs


CBC & Chem 7: 


                                 19 04:12





                                 19 15:29


Labs: 


                  Abnormal Lab Results - Last 24 Hours (Table)











  19 Range/Units





  17:55 23:47 04:12 


 


RBC    3.13 L  (3.80-5.40)  m/uL


 


Hgb    10.1 L  (11.4-16.0)  gm/dL


 


Hct    30.5 L  (34.0-46.0)  %


 


Sodium     (137-145)  mmol/L


 


Chloride     ()  mmol/L


 


Carbon Dioxide     (22-30)  mmol/L


 


Creatinine     (0.52-1.04)  mg/dL


 


Glucose     (74-99)  mg/dL


 


POC Glucose (mg/dL)  130 H  114 H   (75-99)  mg/dL














  19 Range/Units





  04:12 


 


RBC   (3.80-5.40)  m/uL


 


Hgb   (11.4-16.0)  gm/dL


 


Hct   (34.0-46.0)  %


 


Sodium  133 L  (137-145)  mmol/L


 


Chloride  84 L  ()  mmol/L


 


Carbon Dioxide  45 H*  (22-30)  mmol/L


 


Creatinine  0.22 L  (0.52-1.04)  mg/dL


 


Glucose  107 H  (74-99)  mg/dL


 


POC Glucose (mg/dL)   (75-99)  mg/dL








                      Microbiology - Last 24 Hours (Table)











 19 17:18 Blood Culture - Preliminary





 Blood    No Growth after 96 hours














Assessment and Plan


Assessment: 


Hypovolemic hyponatremia with resultant metabolic encephalopathy


-IV fluids completed


-Nephrology recommendations


-Status post 3% normal saline


- diamox X 2 today per nephro 





Acute hypoxic respiratory failure secondary to Pneumonia, possible gram nega

tive, likely aspiration


-Continue with Zosyn, Levaquin


-Pulmonary hygiene


-Follow chest x-ray until clear


-Pulmonary recommendations appreciated





Severe protein calorie malnutrition with cachexia and BMI of 14.9


-Was started on Megace


-General surgery has been consulted for possible PEG tube placement, patient 

refusing until off bipap, okay with -leandro


- Needs to be monitored closely for signs of refeeding syndrome


-Dietary recommendations





Pulmonary lymphadenopathy, likely secondary to known prior laryngeal cancer


-Await records from [facility]


-Will need urgent follow-up with her oncologist at [facility]





Anemia, likely dilutional


-Stable


-Outpatient follow-up


-Follow intermittent CBC





Contraction alkalosis


- diamox per nephro today 





Hypomagnesemia, resolved


Hypokalemia, resolved





DVT prophylaxis: SCDs


Discussed with: Patient, nursing, social work


Anticipated discharge: 3-4 days


Anticipated discharge place: home vs SNF


A total of 35 minutes was spent on the care of this complex patient more than 

50% of the time was spent in counseling and care coordination. none

## 2019-09-17 NOTE — P.PN
Progress Note - Text


Progress Note Date: 09/17/19





Patient now refusing PEG tube insertion.  We will sign off.  Please reconsult if

needed.

## 2019-09-18 LAB
ALBUMIN SERPL-MCNC: 2.6 G/DL (ref 3.5–5)
ALP SERPL-CCNC: 73 U/L (ref 38–126)
ALT SERPL-CCNC: 21 U/L (ref 9–52)
ANION GAP SERPL CALC-SCNC: 4 MMOL/L
AST SERPL-CCNC: 16 U/L (ref 14–36)
BUN SERPL-SCNC: 18 MG/DL (ref 7–17)
CALCIUM SPEC-MCNC: 8.6 MG/DL (ref 8.4–10.2)
CHLORIDE SERPL-SCNC: 90 MMOL/L (ref 98–107)
CO2 SERPL-SCNC: 42 MMOL/L (ref 22–30)
ERYTHROCYTE [DISTWIDTH] IN BLOOD BY AUTOMATED COUNT: 2.95 M/UL (ref 3.8–5.4)
ERYTHROCYTE [DISTWIDTH] IN BLOOD: 12.6 % (ref 11.5–15.5)
GLUCOSE BLD-MCNC: 108 MG/DL (ref 75–99)
GLUCOSE BLD-MCNC: 120 MG/DL (ref 75–99)
GLUCOSE SERPL-MCNC: 121 MG/DL (ref 74–99)
HCT VFR BLD AUTO: 29.9 % (ref 34–46)
HGB BLD-MCNC: 9.5 GM/DL (ref 11.4–16)
MAGNESIUM SPEC-SCNC: 2.2 MG/DL (ref 1.6–2.3)
MCH RBC QN AUTO: 32.4 PG (ref 25–35)
MCHC RBC AUTO-ENTMCNC: 31.9 G/DL (ref 31–37)
MCV RBC AUTO: 101.5 FL (ref 80–100)
PLATELET # BLD AUTO: 294 K/UL (ref 150–450)
POTASSIUM SERPL-SCNC: 3.2 MMOL/L (ref 3.5–5.1)
PROT SERPL-MCNC: 5.2 G/DL (ref 6.3–8.2)
SODIUM SERPL-SCNC: 136 MMOL/L (ref 137–145)
WBC # BLD AUTO: 8.5 K/UL (ref 3.8–10.6)

## 2019-09-18 RX ADMIN — PANTOPRAZOLE SODIUM SCH MG: 40 INJECTION, POWDER, FOR SOLUTION INTRAVENOUS at 09:44

## 2019-09-18 RX ADMIN — METHYLPREDNISOLONE SODIUM SUCCINATE SCH MG: 125 INJECTION, POWDER, FOR SOLUTION INTRAMUSCULAR; INTRAVENOUS at 23:18

## 2019-09-18 RX ADMIN — LEVOFLOXACIN SCH MLS/HR: 5 INJECTION, SOLUTION INTRAVENOUS at 09:43

## 2019-09-18 RX ADMIN — HEPARIN SODIUM SCH UNIT: 5000 INJECTION, SOLUTION INTRAVENOUS; SUBCUTANEOUS at 23:18

## 2019-09-18 RX ADMIN — PIPERACILLIN AND TAZOBACTAM SCH MLS/HR: 3; .375 INJECTION, POWDER, FOR SOLUTION INTRAVENOUS at 23:20

## 2019-09-18 RX ADMIN — METHYLPREDNISOLONE SODIUM SUCCINATE SCH MG: 125 INJECTION, POWDER, FOR SOLUTION INTRAMUSCULAR; INTRAVENOUS at 19:32

## 2019-09-18 RX ADMIN — POTASSIUM CHLORIDE SCH MLS/HR: 14.9 INJECTION, SOLUTION INTRAVENOUS at 05:29

## 2019-09-18 RX ADMIN — HYDROMORPHONE HYDROCHLORIDE PRN MG: 1 INJECTION, SOLUTION INTRAMUSCULAR; INTRAVENOUS; SUBCUTANEOUS at 10:01

## 2019-09-18 RX ADMIN — METHYLPREDNISOLONE SODIUM SUCCINATE SCH MG: 125 INJECTION, POWDER, FOR SOLUTION INTRAMUSCULAR; INTRAVENOUS at 05:28

## 2019-09-18 RX ADMIN — PIPERACILLIN AND TAZOBACTAM SCH MLS/HR: 3; .375 INJECTION, POWDER, FOR SOLUTION INTRAVENOUS at 17:12

## 2019-09-18 RX ADMIN — METHYLPREDNISOLONE SODIUM SUCCINATE SCH MG: 125 INJECTION, POWDER, FOR SOLUTION INTRAMUSCULAR; INTRAVENOUS at 12:30

## 2019-09-18 RX ADMIN — SOYBEAN OIL SCH MLS/HR: 20 INJECTION, SOLUTION INTRAVENOUS at 16:54

## 2019-09-18 RX ADMIN — POTASSIUM CHLORIDE SCH MLS/HR: 14.9 INJECTION, SOLUTION INTRAVENOUS at 09:44

## 2019-09-18 RX ADMIN — PIPERACILLIN AND TAZOBACTAM SCH MLS/HR: 3; .375 INJECTION, POWDER, FOR SOLUTION INTRAVENOUS at 09:45

## 2019-09-18 RX ADMIN — IPRATROPIUM BROMIDE AND ALBUTEROL SULFATE SCH ML: .5; 3 SOLUTION RESPIRATORY (INHALATION) at 11:28

## 2019-09-18 RX ADMIN — INSULIN ASPART SCH: 100 INJECTION, SOLUTION INTRAVENOUS; SUBCUTANEOUS at 19:30

## 2019-09-18 RX ADMIN — INSULIN ASPART SCH: 100 INJECTION, SOLUTION INTRAVENOUS; SUBCUTANEOUS at 05:33

## 2019-09-18 RX ADMIN — IPRATROPIUM BROMIDE AND ALBUTEROL SULFATE SCH ML: .5; 3 SOLUTION RESPIRATORY (INHALATION) at 06:57

## 2019-09-18 RX ADMIN — IPRATROPIUM BROMIDE AND ALBUTEROL SULFATE SCH ML: .5; 3 SOLUTION RESPIRATORY (INHALATION) at 15:12

## 2019-09-18 RX ADMIN — INSULIN ASPART SCH: 100 INJECTION, SOLUTION INTRAVENOUS; SUBCUTANEOUS at 23:18

## 2019-09-18 RX ADMIN — INSULIN ASPART SCH: 100 INJECTION, SOLUTION INTRAVENOUS; SUBCUTANEOUS at 12:29

## 2019-09-18 RX ADMIN — HEPARIN SODIUM SCH UNIT: 5000 INJECTION, SOLUTION INTRAVENOUS; SUBCUTANEOUS at 16:54

## 2019-09-18 RX ADMIN — MEGESTROL ACETATE SCH: 40 SUSPENSION ORAL at 07:49

## 2019-09-18 RX ADMIN — HEPARIN SODIUM SCH UNIT: 5000 INJECTION, SOLUTION INTRAVENOUS; SUBCUTANEOUS at 09:59

## 2019-09-18 RX ADMIN — CEFAZOLIN SCH MLS/HR: 330 INJECTION, POWDER, FOR SOLUTION INTRAMUSCULAR; INTRAVENOUS at 12:35

## 2019-09-18 RX ADMIN — NOREPINEPHRINE BITARTRATE SCH: 1 INJECTION, SOLUTION, CONCENTRATE INTRAVENOUS at 00:34

## 2019-09-18 RX ADMIN — HYDROMORPHONE HYDROCHLORIDE PRN MG: 1 INJECTION, SOLUTION INTRAMUSCULAR; INTRAVENOUS; SUBCUTANEOUS at 04:33

## 2019-09-18 RX ADMIN — IPRATROPIUM BROMIDE AND ALBUTEROL SULFATE SCH ML: .5; 3 SOLUTION RESPIRATORY (INHALATION) at 19:52

## 2019-09-18 NOTE — PN
PROGRESS NOTE



This is a 53-year-old female who was admitted back on September 12, 2019. She came in

with mental status changes, metabolic encephalopathy, severe hyponatremia with a

presenting sodium of 111, as well as acute hypoxemic hypercapnic respiratory failure

secondary to pneumonia.  The patient was intubated from the 13th through 14th of September. She has a history of laryngeal carcinoma, status post chemoradiation.  She

has not received any treatments for her cancer since January 2019.  Anyway, the patient

has been here in the ICU for a number of days now.  Currently, she is on high-flow

oxygen at 15 L which could be titrated down. Through the night, she was on BiPAP with

an IPAP of 14, EPAP of 6 and 45% FiO2.  She is receiving TPN through a PICC catheter or

Midline at 30 mL an hour and she has got a saline IV running at 20 mL an hour.  Today's

chest x-ray continues to show bilateral lower lobe infiltrates, but her chest x-ray has

improved.  She likely has bilateral effusions as well.  She refused a PEG tube.

Yesterday, she was too unstable for a Dobbhoff tube.



She herself is much more awake and alert.  She seems to be much more interactive and

breathing easier.  There are no outward signs of respiratory distress, including

conversational dyspnea, use of accessory muscles, or audible wheezing.



PHYSICAL EXAMINATION:

VITAL SIGNS: Current vital signs are reviewed. Temperature is 97.7, heart rate 86,

respiratory rate mid 20s, blood pressure 93/73, mean 79 and saturations are between 88%

and 92%.

GENERAL: Appears in no acute distress.

HEENT: Examination is grossly unremarkable.  Nasal cannula in place.

NECK:  Supple.  Full range of motion.  No adenopathy.  Neck veins are flat.

CARDIOVASCULAR: Examination reveals regular rhythm and rate.  S1, S2 normal.  No S3,

S4, or murmur.

LUNGS:  Reveal coarse rhonchi.  Breath sounds are diminished.  There is prolongation.

There are bibasilar crackles.

ABDOMEN:  Soft.  Bowel sounds are heard.

EXTREMITIES: Are intact.  No cyanosis, clubbing, or edema.

SKIN: Without rash.

NEUROLOGIC: Examination is nonfocal.



LABS:

Labs are reviewed.  White count 8.5, hemoglobin 9.5, hematocrit 29.9, platelet count

294,000. Sodium 136, potassium 3.2, chloride 90, CO2 is 42, anion gap is 4. BUN and

creatinine were 18 and 0.44.  The rest of the labs look okay.



Microbiology including blood and sputum sampling is negative.



Chest x-ray shows bilateral/bibasilar infiltrates which are improved in my opinion.



MEDICATIONS:

Medications are reviewed.



ASSESSMENT:

1. Acute metabolic encephalopathy in large part related to the patient's profound

    hyponatremia (sodium equals 111), which was determined to be hypovolemic

    hyponatremia, which has significantly improved.

2. Acute hypoxemic and hypercapnic respiratory failure secondary to pneumonia, status

    post intubation on the 13th and subsequent extubation on September 14th.

3. Ongoing hypoxemic respiratory failure, requiring nocturnal BiPAP therapy and high-

    flow oxygen therapy.

4. Severe hypovolemic hyponatremia, much improved.

5. History of laryngeal carcinoma, status post chemo/radiation therapy, possibly

    metastatic, with her last chemo radiation treatment in January 2019.

6. Severe protein/calorie malnutrition and inanition.

7. Anorexia/cachexia syndrome.

8. History of previous tobacco dependence.

9. History of anemia of chronic disease.

10.Severe electrolyte disturbance, resolved.

11.Contraction metabolic alkalosis, improved.

12.Anorexia/cachexia syndrome, chronic.

13.Significantly poor dentition with dental caries.



PLAN:

Clinically, the patient appears to be much better today.  She was started on TPN

yesterday.  She was maintained on BiPAP at 14 and 6 and 45% through the night.  She has

currently been weaned down to high-flow O2.  I did tell the nurses and the respiratory

therapy staff that it was important to maintain saturations between 88% and 92% as she

has a chronic CO2 retainer.  Chest x-ray is improved.  She continues on all her current

medications. Appreciate the input by the other consultants.  Overall, her prognosis

remains still very guarded, but she does seem to show some improvement.  We will

continue to follow.





MMODL / IJN: [id number] / Job#: [id number]

## 2019-09-18 NOTE — P.PN
Subjective


Progress Note Date: 19


Principal diagnosis: 


Weakness 





Patient is a 53-year-old  female with a past medical history of 

laryngeal cancer status post chemo and radiation, and history of tobacco abuse 

who presented to the emergency department with weakness and difficulty staying 

awake.  Patient underwent treatment for laryngeal cancer in 2019.  Since

that point in time she been having difficulty swallowing in his been tolerating 

pudding.  She's become severely cachectic and weak.  In the ER she underwent an 

extensive evaluation.  She was found to have a sodium of 111, she underwent an 

acute abdominal series which was nonspecific.  She underwent a CT of the chest 

which showed emphysema with extensive pulmonary interstitial infiltrates as well

as multiple enlarged lymph nodes.  In the emergency department she was having 

respiratory distress and appeared stridorous was subsequently admitted to the 

ICU.  Initially she was 90% on 4 L nasal cannula and was found to be 

significantly acidotic with a pH of 7.13.  She was subsequently intubated and 

placed on mechanical ventilation.  She was seen by critical care.  Due to her 

hypotension she required norepinephrine.  She was also seen by nephrology due to

her low sodiums.  She did receive 3% normal saline due to her altered mentation.

 She was started on empiric IV antibiotics for possible pneumonia of Levaquin 

and Zosyn.  She was transitioned from 3% saline to normal saline.  She was 

extubated on  without any difficulties.  She then developed significant 

respiratory distress on the morning of  and required BiPAP. Patient amenable

for -Leandro. 














2019 : Patient remains on BiPAP, she is still frustrated, she denies 

shortness of breath.  She states that she is still in the hospital . 








Objective





- Vital Signs


Vital signs: 


                                   Vital Signs











Temp  97.7 F   19 04:00


 


Pulse  96   19 07:16


 


Resp  26 H  19 07:00


 


BP  93/73   19 07:00


 


Pulse Ox  92 L  19 07:00








                                 Intake & Output











 19





 18:59 06:59 18:59


 


Intake Total 830 330 20


 


Output Total 385 1410 75


 


Balance 445 -1080 -55


 


Weight 48.2 kg 39.326 kg 


 


Intake:   


 


   330 20


 


    .9 kvo 200 230 20


 


    Magnesium Sulfate-D5w Pmx 200  





    1 gm In Dextrose/Water 1   





    100ml.bag @ 100 mls/hr   





    IVPB Q1H [facility] Rx#:   





    [id number]   


 


    Piperacillin-Tazobactam 3  100 





    .375 gm In Sodium   





    Chloride 0.9% 100 ml @ 25   





    mls/hr IVPB Q8HR [facility] Rx#   





    :[id number]   


 


    Potassium Chloride 10 meq 200  





    In Water For Injection 1   





    100ml.bag @ 100 mls/hr   





    IVPB Q1H [facility] Rx#:   





    [id number]   


 


  Intake, IV Titration 230  





  Amount   


 


    Levofloxacin 500Mg-D5w 100  





    Pmx 500 mg In Dextrose/   





    Water 1 100ml.bag @ 100   





    mls/hr IVPB Q24H [facility] Rx#:   





    [id number]   


 


    Mvi, Adult No.4 with Vit 30  





    K 10 ml Trace (Conc-1Ml/   





    Dose) 1 ml In Amino Acid   





    5%-D15w+Lytes*E* 1,000 ml   





    @ 30 mls/hr IV .Q24H [facility]   





    Rx#:[id number]   


 


    Piperacillin-Tazobactam 3 100  





    .375 gm In Sodium   





    Chloride 0.9% 100 ml @ 25   





    mls/hr IVPB Q8HR [facility] Rx#   





    :[id number]   


 


Output:   


 


  Urine 385 1410 75


 


Other:   


 


  Voiding Method Indwelling Catheter Indwelling Catheter 








                       ABP, PAP, CO, CI - Last Documented











Arterial Blood Pressure        75/63

















- Exam


General: Ill-appearing, cachectic, on BiPAP


Derm: warm, dry


Head: atraumatic, normocephalic


Eyes: EOMI


Cardiovascular: S1S2 reg, no murmur


Lungs:few Rhonchi bilateral,  on BiPAP, no wheezing 


Abdominal: soft,  nontender to palpation, no guarding


Ext: + gross muscle atrophy, no edema, no contractures


Neuro:  CN II-XI grossly intact, no focal neuro deficits


Psych: Alert, oriented








- Labs


CBC & Chem 7: 


                                 19 04:30





                                 19 04:30


Labs: 


                  Abnormal Lab Results - Last 24 Hours (Table)











  19 Range/Units





  15:29 18:03 23:28 


 


RBC     (3.80-5.40)  m/uL


 


Hgb     (11.4-16.0)  gm/dL


 


Hct     (34.0-46.0)  %


 


MCV     (80.0-100.0)  fL


 


Sodium  132 L    (137-145)  mmol/L


 


Potassium     (3.5-5.1)  mmol/L


 


Chloride  85 L    ()  mmol/L


 


Carbon Dioxide  46 H*    (22-30)  mmol/L


 


BUN     (7-17)  mg/dL


 


Creatinine  0.35 L    (0.52-1.04)  mg/dL


 


Glucose  107 H    (74-99)  mg/dL


 


POC Glucose (mg/dL)   140 H  131 H  (75-99)  mg/dL


 


Total Protein  5.3 L    (6.3-8.2)  g/dL


 


Albumin  2.6 L    (3.5-5.0)  g/dL














  19 Range/Units





  04:30 04:30 


 


RBC   2.95 L  (3.80-5.40)  m/uL


 


Hgb   9.5 L  (11.4-16.0)  gm/dL


 


Hct   29.9 L  (34.0-46.0)  %


 


MCV   101.5 H  (80.0-100.0)  fL


 


Sodium  136 L   (137-145)  mmol/L


 


Potassium  3.2 L   (3.5-5.1)  mmol/L


 


Chloride  90 L   ()  mmol/L


 


Carbon Dioxide  42 H*   (22-30)  mmol/L


 


BUN  18 H   (7-17)  mg/dL


 


Creatinine  0.44 L   (0.52-1.04)  mg/dL


 


Glucose  121 H   (74-99)  mg/dL


 


POC Glucose (mg/dL)    (75-99)  mg/dL


 


Total Protein  5.2 L   (6.3-8.2)  g/dL


 


Albumin  2.6 L   (3.5-5.0)  g/dL








                      Microbiology - Last 24 Hours (Table)











 19 17:18 Blood Culture - Preliminary





 Blood    No Growth after 120 hours














Assessment and Plan


Plan: 


Assessment: 


Hypovolemic hyponatremia with resultant metabolic encephalopathy: Now serum 

sodium 136


-IV fluids completed


-Nephrology recommendations appreciated


-Status post 3% normal saline


- On diamox per renal recommendations





Acute hypoxic respiratory failure secondary to Pneumonia, possible gram 

negative, likely aspiration


-Continue with Zosyn, Levaquin


-Pulmonary hygiene


-Pulmonary recommendations appreciated


-Continues to be on BiPAP





Severe protein calorie malnutrition with cachexia and BMI of 14.9


-Was started on Megace


-General surgery has been consulted for possible PEG tube placement, patient 

refusing until off bipap, okay with -leandro, we will follow.


-Dietary recommendations appreciated





Pulmonary lymphadenopathy, likely secondary to known prior laryngeal cancer


-Await records from [facility]


-Will need follow-up with her oncologist at [facility]








Anemia, likely secondary to volume expansion


-Stable


-Outpatient follow-up


-Hemoglobin stable at 9.8 





Contraction alkalosis


- diamox per nephro , serum bicarbonate improved for 42 from 46.





Hypomagnesemia, replace as indicated K 3.2 


Hypokalemia, replace as indicated





DVT prophylaxis: SCDs


Discussed with: Patient, nursing


Anticipated discharge: 3-4 days


Anticipated discharge place: home vs SNF

## 2019-09-18 NOTE — P.PN
Subjective





Patient is seen in follow-up for hyponatremia.  Sodium level is 136 today.  

Bicarb level is down to 42.  She is maintained on Diamox.  She is off IVFs.  Not

able to tolerate oral intake due to concern for aspiration.  Currently receiving

TPN.  She is now agreeable to get a PEG tube placed.





Vital signs are stable.


General: The patient appeared well nourished and normally developed. 


HEENT: Head exam is unremarkable. Neck is without jugular venous distension.


LUNGS: Breath sounds decreased.


HEART: Rate and Rhythm are regular. First and second heart sounds normal. No 

murmurs, rubs or gallops. 


ABDOMEN: Abdominal exam reveals normal bowel sounds. Non-tender and non-

distended. No evidence of peritonitis.


EXTREMITITES: No clubbing, cyanosis, or edema.





Objective





- Vital Signs


Vital signs: 


                                   Vital Signs











Temp  98 F   09/18/19 08:00


 


Pulse  100   09/18/19 11:39


 


Resp  28 H  09/18/19 11:00


 


BP  114/81   09/18/19 11:00


 


Pulse Ox  91 L  09/18/19 11:00








                                 Intake & Output











 09/17/19 09/18/19 09/18/19





 18:59 06:59 18:59


 


Intake Total 830 330 370


 


Output Total 385 1410 875


 


Balance 293 -1411 -452


 


Weight 48.2 kg 39.326 kg 


 


Intake:   


 


   330 370


 


    .9 kvo 200 230 100


 


    Magnesium Sulfate-D5w Pmx 200  





    1 gm In Dextrose/Water 1   





    100ml.bag @ 100 mls/hr   





    IVPB Q1H [facility] Rx#:   





    [id number]   


 


    Mvi, Adult No.4 with Vit   120





    K 10 ml Trace (Conc-1Ml/   





    Dose) 1 ml In Amino Acid   





    5%-D15w+Lytes*E* 1,000 ml   





    @ 30 mls/hr IV .Q24H [facility]   





    Rx#:[id number]   


 


    Piperacillin-Tazobactam 3  100 50





    .375 gm In Sodium   





    Chloride 0.9% 100 ml @ 25   





    mls/hr IVPB Q8HR [facility] Rx#   





    :[id number]   


 


    Potassium Chloride 10 meq 200  





    In Water For Injection 1   





    100ml.bag @ 100 mls/hr   





    IVPB Q1H [facility] Rx#:   





    [id number]   


 


    Potassium Chloride 20 meq   100





    In Water For Injection 1   





    100ml.bag @ 50 mls/hr   





    IVPB Q2H [facility] Rx#:   





    [id number]   


 


  Intake, IV Titration 230  





  Amount   


 


    Levofloxacin 500Mg-D5w 100  





    Pmx 500 mg In Dextrose/   





    Water 1 100ml.bag @ 100   





    mls/hr IVPB Q24H [facility] Rx#:   





    [id number]   


 


    Mvi, Adult No.4 with Vit 30  





    K 10 ml Trace (Conc-1Ml/   





    Dose) 1 ml In Amino Acid   





    5%-D15w+Lytes*E* 1,000 ml   





    @ 30 mls/hr IV .Q24H [facility]   





    Rx#:[id number]   


 


    Piperacillin-Tazobactam 3 100  





    .375 gm In Sodium   





    Chloride 0.9% 100 ml @ 25   





    mls/hr IVPB Q8HR [facility] Rx#   





    :[id number]   


 


Output:   


 


  Urine 385 1410 875


 


Other:   


 


  Voiding Method Indwelling Catheter Indwelling Catheter 








                       ABP, PAP, CO, CI - Last Documented











Arterial Blood Pressure        75/63

















- Labs


CBC & Chem 7: 


                                 09/18/19 04:30





                                 09/18/19 04:30


Labs: 


                  Abnormal Lab Results - Last 24 Hours (Table)











  09/17/19 09/17/19 09/17/19 Range/Units





  15:29 18:03 23:28 


 


RBC     (3.80-5.40)  m/uL


 


Hgb     (11.4-16.0)  gm/dL


 


Hct     (34.0-46.0)  %


 


MCV     (80.0-100.0)  fL


 


Sodium  132 L    (137-145)  mmol/L


 


Potassium     (3.5-5.1)  mmol/L


 


Chloride  85 L    ()  mmol/L


 


Carbon Dioxide  46 H*    (22-30)  mmol/L


 


BUN     (7-17)  mg/dL


 


Creatinine  0.35 L    (0.52-1.04)  mg/dL


 


Glucose  107 H    (74-99)  mg/dL


 


POC Glucose (mg/dL)   140 H  131 H  (75-99)  mg/dL


 


Total Protein  5.3 L    (6.3-8.2)  g/dL


 


Albumin  2.6 L    (3.5-5.0)  g/dL














  09/18/19 09/18/19 09/18/19 Range/Units





  04:30 04:30 11:33 


 


RBC   2.95 L   (3.80-5.40)  m/uL


 


Hgb   9.5 L   (11.4-16.0)  gm/dL


 


Hct   29.9 L   (34.0-46.0)  %


 


MCV   101.5 H   (80.0-100.0)  fL


 


Sodium  136 L    (137-145)  mmol/L


 


Potassium  3.2 L    (3.5-5.1)  mmol/L


 


Chloride  90 L    ()  mmol/L


 


Carbon Dioxide  42 H*    (22-30)  mmol/L


 


BUN  18 H    (7-17)  mg/dL


 


Creatinine  0.44 L    (0.52-1.04)  mg/dL


 


Glucose  121 H    (74-99)  mg/dL


 


POC Glucose (mg/dL)    108 H  (75-99)  mg/dL


 


Total Protein  5.2 L    (6.3-8.2)  g/dL


 


Albumin  2.6 L    (3.5-5.0)  g/dL








                      Microbiology - Last 24 Hours (Table)











 09/12/19 17:18 Blood Culture - Preliminary





 Blood    No Growth after 120 hours














Assessment and Plan


Plan: 





Assessment:


1.  Hypovolemic hyponatremia initially improved with IV hydration.  Sodium level

CXXXVI today.  She is off IV fluids.


2.  Hypokalemia due to diuretics.  Magnesium normal.


3.  Laryngeal cancer.


4.  Possible aspiration.  PEG tube to be placed today.


5.  Small left pleural effusion.


6.  Metabolic alkalosis.  This can be due to Lasix and also compensatory for 

respiratory acidosis.  Better.





Plan:


Maintain Diamox for the next 24 hours.


Potassium was replaced.


Repeat electrolytes in the morning.

## 2019-09-18 NOTE — XR
EXAMINATION TYPE: XR chest 1V portable

 

DATE OF EXAM: 9/18/2019

 

COMPARISON: Prior chest x-ray 9/17/2019

 

HISTORY: Abnormal chest x-ray, pleural effusion

 

TECHNIQUE: Single frontal view of the chest is obtained.

 

FINDINGS:  Pleural parenchymal changes are stable. Central venous catheters are unchanged. There are 
overlying cardiac leads. No pneumothorax.

 

IMPRESSION:  No significant interval change.

## 2019-09-19 LAB
ALBUMIN SERPL-MCNC: 2.4 G/DL (ref 3.5–5)
ALP SERPL-CCNC: 71 U/L (ref 38–126)
ALT SERPL-CCNC: 32 U/L (ref 9–52)
ANION GAP SERPL CALC-SCNC: 6 MMOL/L
ANION GAP SERPL CALC-SCNC: 7 MMOL/L
AST SERPL-CCNC: 26 U/L (ref 14–36)
BASOPHILS # BLD AUTO: 0 K/UL (ref 0–0.2)
BASOPHILS NFR BLD AUTO: 0 %
BUN SERPL-SCNC: 16 MG/DL (ref 7–17)
BUN SERPL-SCNC: 16 MG/DL (ref 7–17)
CALCIUM SPEC-MCNC: 8.4 MG/DL (ref 8.4–10.2)
CALCIUM SPEC-MCNC: 8.7 MG/DL (ref 8.4–10.2)
CHLORIDE SERPL-SCNC: 100 MMOL/L (ref 98–107)
CHLORIDE SERPL-SCNC: 103 MMOL/L (ref 98–107)
CO2 SERPL-SCNC: 26 MMOL/L (ref 22–30)
CO2 SERPL-SCNC: 29 MMOL/L (ref 22–30)
EOSINOPHIL # BLD AUTO: 0 K/UL (ref 0–0.7)
EOSINOPHIL NFR BLD AUTO: 0 %
ERYTHROCYTE [DISTWIDTH] IN BLOOD BY AUTOMATED COUNT: 2.94 M/UL (ref 3.8–5.4)
ERYTHROCYTE [DISTWIDTH] IN BLOOD: 12.8 % (ref 11.5–15.5)
GLUCOSE BLD-MCNC: 111 MG/DL (ref 75–99)
GLUCOSE BLD-MCNC: 128 MG/DL (ref 75–99)
GLUCOSE BLD-MCNC: 128 MG/DL (ref 75–99)
GLUCOSE BLD-MCNC: 83 MG/DL (ref 75–99)
GLUCOSE SERPL-MCNC: 103 MG/DL (ref 74–99)
GLUCOSE SERPL-MCNC: 120 MG/DL (ref 74–99)
HCT VFR BLD AUTO: 29.5 % (ref 34–46)
HGB BLD-MCNC: 8.5 GM/DL (ref 11.4–16)
LYMPHOCYTES # SPEC AUTO: 0 K/UL (ref 1–4.8)
LYMPHOCYTES NFR SPEC AUTO: 0 %
MAGNESIUM SPEC-SCNC: 1.9 MG/DL (ref 1.6–2.3)
MCH RBC QN AUTO: 28.8 PG (ref 25–35)
MCHC RBC AUTO-ENTMCNC: 28.8 G/DL (ref 31–37)
MCV RBC AUTO: 100.2 FL (ref 80–100)
MONOCYTES # BLD AUTO: 0.4 K/UL (ref 0–1)
MONOCYTES NFR BLD AUTO: 4 %
NEUTROPHILS # BLD AUTO: 8.7 K/UL (ref 1.3–7.7)
NEUTROPHILS NFR BLD AUTO: 95 %
PLATELET # BLD AUTO: 309 K/UL (ref 150–450)
POTASSIUM SERPL-SCNC: 2.9 MMOL/L (ref 3.5–5.1)
POTASSIUM SERPL-SCNC: 3.3 MMOL/L (ref 3.5–5.1)
PROT SERPL-MCNC: 5.1 G/DL (ref 6.3–8.2)
SODIUM SERPL-SCNC: 135 MMOL/L (ref 137–145)
SODIUM SERPL-SCNC: 136 MMOL/L (ref 137–145)
WBC # BLD AUTO: 9.2 K/UL (ref 3.8–10.6)

## 2019-09-19 PROCEDURE — 0DJ08ZZ INSPECTION OF UPPER INTESTINAL TRACT, VIA NATURAL OR ARTIFICIAL OPENING ENDOSCOPIC: ICD-10-PCS

## 2019-09-19 RX ADMIN — IPRATROPIUM BROMIDE AND ALBUTEROL SULFATE SCH ML: .5; 3 SOLUTION RESPIRATORY (INHALATION) at 11:49

## 2019-09-19 RX ADMIN — CEFAZOLIN SCH MLS/HR: 330 INJECTION, POWDER, FOR SOLUTION INTRAMUSCULAR; INTRAVENOUS at 13:47

## 2019-09-19 RX ADMIN — IPRATROPIUM BROMIDE AND ALBUTEROL SULFATE SCH ML: .5; 3 SOLUTION RESPIRATORY (INHALATION) at 17:19

## 2019-09-19 RX ADMIN — IPRATROPIUM BROMIDE AND ALBUTEROL SULFATE SCH ML: .5; 3 SOLUTION RESPIRATORY (INHALATION) at 21:11

## 2019-09-19 RX ADMIN — HYDROMORPHONE HYDROCHLORIDE PRN MG: 1 INJECTION, SOLUTION INTRAMUSCULAR; INTRAVENOUS; SUBCUTANEOUS at 23:08

## 2019-09-19 RX ADMIN — HEPARIN SODIUM SCH UNIT: 5000 INJECTION, SOLUTION INTRAVENOUS; SUBCUTANEOUS at 23:08

## 2019-09-19 RX ADMIN — PIPERACILLIN AND TAZOBACTAM SCH MLS/HR: 3; .375 INJECTION, POWDER, FOR SOLUTION INTRAVENOUS at 09:32

## 2019-09-19 RX ADMIN — MAGNESIUM SULFATE IN DEXTROSE SCH MLS/HR: 10 INJECTION, SOLUTION INTRAVENOUS at 09:32

## 2019-09-19 RX ADMIN — INSULIN ASPART SCH: 100 INJECTION, SOLUTION INTRAVENOUS; SUBCUTANEOUS at 13:48

## 2019-09-19 RX ADMIN — PIPERACILLIN AND TAZOBACTAM SCH MLS/HR: 3; .375 INJECTION, POWDER, FOR SOLUTION INTRAVENOUS at 23:10

## 2019-09-19 RX ADMIN — MAGNESIUM SULFATE IN DEXTROSE SCH MLS/HR: 10 INJECTION, SOLUTION INTRAVENOUS at 12:00

## 2019-09-19 RX ADMIN — POTASSIUM CHLORIDE SCH MLS/HR: 14.9 INJECTION, SOLUTION INTRAVENOUS at 21:21

## 2019-09-19 RX ADMIN — IPRATROPIUM BROMIDE AND ALBUTEROL SULFATE SCH ML: .5; 3 SOLUTION RESPIRATORY (INHALATION) at 08:15

## 2019-09-19 RX ADMIN — POTASSIUM CHLORIDE SCH MLS/HR: 14.9 INJECTION, SOLUTION INTRAVENOUS at 13:43

## 2019-09-19 RX ADMIN — HEPARIN SODIUM SCH UNIT: 5000 INJECTION, SOLUTION INTRAVENOUS; SUBCUTANEOUS at 16:38

## 2019-09-19 RX ADMIN — METHYLPREDNISOLONE SODIUM SUCCINATE SCH MG: 40 INJECTION, POWDER, FOR SOLUTION INTRAMUSCULAR; INTRAVENOUS at 16:37

## 2019-09-19 RX ADMIN — INSULIN ASPART SCH: 100 INJECTION, SOLUTION INTRAVENOUS; SUBCUTANEOUS at 23:57

## 2019-09-19 RX ADMIN — INSULIN ASPART SCH: 100 INJECTION, SOLUTION INTRAVENOUS; SUBCUTANEOUS at 06:27

## 2019-09-19 RX ADMIN — MEGESTROL ACETATE SCH: 40 SUSPENSION ORAL at 09:36

## 2019-09-19 RX ADMIN — PANTOPRAZOLE SODIUM SCH MG: 40 INJECTION, POWDER, FOR SOLUTION INTRAVENOUS at 09:33

## 2019-09-19 RX ADMIN — HEPARIN SODIUM SCH UNIT: 5000 INJECTION, SOLUTION INTRAVENOUS; SUBCUTANEOUS at 09:33

## 2019-09-19 RX ADMIN — PIPERACILLIN AND TAZOBACTAM SCH MLS/HR: 3; .375 INJECTION, POWDER, FOR SOLUTION INTRAVENOUS at 16:38

## 2019-09-19 RX ADMIN — INSULIN ASPART SCH: 100 INJECTION, SOLUTION INTRAVENOUS; SUBCUTANEOUS at 16:39

## 2019-09-19 RX ADMIN — LEVOFLOXACIN SCH MLS/HR: 5 INJECTION, SOLUTION INTRAVENOUS at 09:36

## 2019-09-19 RX ADMIN — METHYLPREDNISOLONE SODIUM SUCCINATE SCH MG: 40 INJECTION, POWDER, FOR SOLUTION INTRAMUSCULAR; INTRAVENOUS at 23:08

## 2019-09-19 RX ADMIN — METHYLPREDNISOLONE SODIUM SUCCINATE SCH MG: 125 INJECTION, POWDER, FOR SOLUTION INTRAMUSCULAR; INTRAVENOUS at 06:55

## 2019-09-19 RX ADMIN — POTASSIUM CHLORIDE SCH MLS/HR: 14.9 INJECTION, SOLUTION INTRAVENOUS at 19:25

## 2019-09-19 RX ADMIN — POTASSIUM CHLORIDE SCH MLS/HR: 14.9 INJECTION, SOLUTION INTRAVENOUS at 07:45

## 2019-09-19 RX ADMIN — NOREPINEPHRINE BITARTRATE SCH: 1 INJECTION, SOLUTION, CONCENTRATE INTRAVENOUS at 01:00

## 2019-09-19 RX ADMIN — HYDROMORPHONE HYDROCHLORIDE PRN MG: 1 INJECTION, SOLUTION INTRAMUSCULAR; INTRAVENOUS; SUBCUTANEOUS at 02:18

## 2019-09-19 RX ADMIN — POTASSIUM CHLORIDE SCH MLS/HR: 14.9 INJECTION, SOLUTION INTRAVENOUS at 10:04

## 2019-09-19 NOTE — XR
EXAMINATION TYPE: XR chest 1V portable

 

DATE OF EXAM: 9/19/2019

 

COMPARISON: Prior chest x-ray 9/18/2019

 

HISTORY: Adventitious lung sounds, abnormal chest x-ray

 

TECHNIQUE: Single frontal view of the chest is obtained.

 

FINDINGS:  Findings are similar to prior exam. Bilateral airspace disease is again noted. Central dsetiny
ous catheters are stable. Heart size is not enlarged. There are overlying cardiac leads. Patient is r
otated. No pneumothorax. Bones are stable.

 

IMPRESSION:  Correlate for pneumonia, there may be associated effusions. Possible underlying tumor.

## 2019-09-19 NOTE — PN
PROGRESS NOTE



PULMONARY/CRITICAL CARE PROGRESS NOTE:



DATE OF SERVICE:

September 19, 2019



This is a 53-year-old female who was admitted back on September 12, 2019.  She came in

initially with mental status changes, metabolic encephalopathy, severe hyponatremia

with a sodium of 111, as well as acute hypoxemic and hypercapnic respiratory failure

likely secondary to aspiration pneumonia.  She was intubated and mechanically

ventilated for a brief period of time between September 13th  through the 14th.  She

does have a history of laryngeal carcinoma, status post chemoradiation.  She has not

been treated since January 2019 for that disease process.  Currently, she is doing much

better.  She has made dramatic improvements in the last 48 hours.  She has been weaned

down to 2 L by nasal cannula.  She is getting TPN at 30 mL an hour and a saline IV at

20 mL an hour.  She is going to have a PEG tube today for nutrition.  She remains on

Zosyn and Levaquin.  Microbiologic studies have been negative.  Her chest x-ray still

shows bibasilar infiltrates, atelectasis and small effusions, her disease process seems

to be more right than left-sided.  Overall though, she is feeling much better.  She is

sitting up in bed.  She actually has a smile on her face.



PHYSICAL EXAMINATION:

VITAL SIGNS: Current vital signs are reviewed. Temperature is 98.2, heart rate 86,

respiratory rate 14, blood pressure 124/94, mean 104 and 2 L saturation 93% to 94%.

GENERAL: Appears in no acute distress.

HEENT: Examination is grossly unremarkable.  Teeth in poor repair.

NECK:  Supple.  Full range of motion.  No adenopathy or thyromegaly.  Neck veins are

flat.

CARDIOVASCULAR: Examination reveals regular rhythm and rate.  Heart sounds are distant.

S1, S2 normal.  Heart rate is about 80 beats per minute.

LUNGS:  Reveal a few scattered coarse rhonchi.  Breath sounds equal.  No crackles.  No

wheezes.

ABDOMEN:  Soft.  Bowel sounds are not noted.

EXTREMITIES: Are intact.  No edema.

SKIN: Without rash.

NEUROLOGIC: Examination is nonfocal.



Chest x-ray is reviewed.  Again, it shows diffuse bilateral lower lobe infiltrates.

There is probably some atelectasis and small effusions as well.  There is likely some

pneumonic processes as well bilaterally.



Microbiologic studies thus far have been negative.



LABS:

Labs are reviewed.  White count 9.2, hemoglobin 8.5, hematocrit 29.5, platelet count

309,000. Sodium 135, potassium 2.9, chloride 100, CO2 is 29. BUN and creatinine were 16

and 0.36. Anion gap is 6. Albumin 2.4.



MEDICATIONS:

Medications are reviewed.  She is on appropriate medications.  She is on updrafts.  She

is getting her nutrition.  Her Solu-Medrol dose is 40 mg q.8 hours.  She is getting

Zosyn as an antibiotic.



ASSESSMENT:

1. Acute metabolic encephalopathy in large part related to the patient's profound

    hyponatremia, which was determined to be hypovolemic hyponatremia, significantly

    improved.

2. Acute hypoxemic and hypercapnic respiratory failure secondary to aspiration

    pneumonia, status post intubation on the 13th of September and extubation

    subsequently on the 14th.

3. Ongoing hypoxemic respiratory failure, which has improved dramatically, and the

    patient's oxygen requirements have significantly declined.

4. Severe hypovolemic hyponatremia, resolved.

5. History of laryngeal carcinoma, status post chemoradiation, possibly metastatic.

6. Severe protein-calorie malnutrition and inanition.

7. Anorexia/cachexia syndrome.

8. History of previous tobacco dependence.

9. History of anemia of chronic disease.

10.Severe electrolyte disturbance, improved.

11.Hypoalbuminemia secondary to poor nutrition.

12.Contraction metabolic alkalosis, improved.

13.Significantly poor dentition with caries.



PLAN:

The patient is doing much better.  She is going for an EGD today.  She will have a PEG

tube placed hopefully by the surgeon.  She remains on TPN at 30 mL an hour and a saline

IV at 20 mL an hour.  She remains on antibiotics.  Microbiologic studies are negative.

Additional recommendations and suggestions are forthcoming.  Prognosis is guarded.





MMODL / IJN: [id number] / Job#: [id number]

## 2019-09-19 NOTE — P.PN
Subjective


Progress Note Date: 19 (delayed charting seen at 0900)


Principal diagnosis: 


Weakness and sleeping more than often than normal





Patient is a 53-year-old  female with a past medical history of davi

ngeal cancer status post chemo and radiation, and history of tobacco abuse who 

presented to the emergency department with weakness and difficulty staying 

awake.    Patient underwent treatment for laryngeal cancer in 2019.  

Since that point in time she been having difficulty swallowing in his been 

tolerating pudding.  She's become severely cachectic and weak.  In the ER she 

underwent an extensive evaluation.  She was found to have a sodium of 111, she 

underwent an acute abdominal series which was nonspecific.  She underwent a CT 

of the chest which showed emphysema with extensive pulmonary interstitial 

infiltrates as well as multiple enlarged lymph nodes.  In the emergency 

department she was having respiratory distress and appeared stridorous was 

subsequently admitted to the ICU.  Initially she was 90% on 4 L nasal cannula 

and was found to be significantly acidotic with a pH of 7.13.  She was 

subsequently intubated and placed on mechanical ventilation.  She was seen by 

critical care.  Due to her hypotension she required norepinephrine.  She was 

also seen by nephrology due to her low sodiums.  She did receive 3% normal 

saline due to her altered mentation.  She was started on empiric IV antibiotics 

for possible pneumonia of Levaquin and Zosyn.  She was transitioned from 3% sa

line to normal saline.  She was extubated on  without any difficulties.  She

then developed significant respiratory distress on the morning of  and 

required BiPAP, which was able to be weaned by the morning of . Patient 

amenable for -Leandro, attempted placement on  unable to do so due to scar 

tissue and inflammation. Would be able to have open G tube.





Patient seen and examined at bedside.  Feeling angry and frustrated.  No chest 

pain or shortness of breath.  No nausea or vomiting.  No diarrhea.  Wanting to 

come off BiPAP get better and go home.  Anxious to have tube feedings restarted.







Objective





- Vital Signs


Vital signs: 


                                   Vital Signs











Temp  98 F   19 08:00


 


Pulse  94   19 12:34


 


Resp  18   19 10:00


 


BP  134/81   19 10:00


 


Pulse Ox  91 L  19 10:00








                                 Intake & Output











 19





 18:59 06:59 18:59


 


Intake Total 863 660 840


 


Output Total 1475 1945 1140


 


Balance -612 -8205 -300


 


Weight  38.4 kg 


 


Intake:   


 


   660 840


 


    .9 kvo 260 120 90


 


    Fat Emulsion 20% 250 ml @ 80 160 





    20.833 mls/hr IV MoWeFr@   





    1600 [facility] Rx#:[id number]   


 


    Magnesium Sulfate-D5w Pmx   200





    1 gm In Dextrose/Water 1   





    100ml.bag @ 100 mls/hr   





    IVPB Q1H [facility] Rx#:   





    [id number]   


 


    Mvi, Adult No.4 with Vit 273 330 150





    K 10 ml Trace (Conc-1Ml/   





    Dose) 1 ml In Amino Acid   





    5%-D15w+Lytes*E* 1,000 ml   





    @ 30 mls/hr IV .Q24H [facility]   





    Rx#:[id number]   


 


    Piperacillin-Tazobactam 3 150 50 100





    .375 gm In Sodium   





    Chloride 0.9% 100 ml @ 25   





    mls/hr IVPB Q8HR [facility] Rx#   





    :[id number]   


 


    Potassium Chloride 20 meq 100  





    In Water For Injection 1   





    100ml.bag @ 50 mls/hr   





    IVPB Q2H [facility] Rx#:   





    [id number]   


 


    Potassium Chloride 20 meq   200





    In Water For Injection 1   





    100ml.bag @ 50 mls/hr   





    IVPB Q2H [facility] Rx#:   





    [id number]   


 


Output:   


 


  Urine 1475 1945 1140


 


Other:   


 


  Voiding Method Indwelling Catheter Indwelling Catheter 








                       ABP, PAP, CO, CI - Last Documented











Arterial Blood Pressure        75/63

















- Exam


General: Ill-appearing, cachectic, appears older than stated age, temporal 

wasting


Derm: warm, dry


Head: atraumatic, normocephalic, symmetric


Eyes: EOMI, no lid lag, anicteric sclera


Mouth: no lip lesion, mucus membranes dry


Cardiovascular: S1S2 reg, no murmur, positive posterior tibial pulse bilateral, 


Lungs: Ronchi bilateral, no accessory muscle use


Abdominal: soft,  nontender to palpation, no guarding, no appreciable 

organomegaly


Ext: + gross muscle atrophy, no edema, no contractures


Neuro:  CN II-XI grossly intact, no focal neuro deficits


Psych: Alert, oriented, flat affect 








- Labs


CBC & Chem 7: 


                                 19 04:37





                                 19 04:37


Labs: 


                  Abnormal Lab Results - Last 24 Hours (Table)











  19 Range/Units





  23:16 04:37 04:37 


 


RBC    2.94 L  (3.80-5.40)  m/uL


 


Hgb    8.5 L  (11.4-16.0)  gm/dL


 


Hct    29.5 L  (34.0-46.0)  %


 


MCV    100.2 H  (80.0-100.0)  fL


 


MCHC    28.8 L  (31.0-37.0)  g/dL


 


Neutrophils #    8.7 H  (1.3-7.7)  k/uL


 


Lymphocytes #    0.0 L  (1.0-4.8)  k/uL


 


Sodium   135 L   (137-145)  mmol/L


 


Potassium   2.9 L   (3.5-5.1)  mmol/L


 


Creatinine   0.36 L   (0.52-1.04)  mg/dL


 


Glucose   120 H   (74-99)  mg/dL


 


POC Glucose (mg/dL)  120 H    (75-99)  mg/dL


 


Total Protein   5.1 L   (6.3-8.2)  g/dL


 


Albumin   2.4 L   (3.5-5.0)  g/dL














  19 Range/Units





  06:10 12:12 


 


RBC    (3.80-5.40)  m/uL


 


Hgb    (11.4-16.0)  gm/dL


 


Hct    (34.0-46.0)  %


 


MCV    (80.0-100.0)  fL


 


MCHC    (31.0-37.0)  g/dL


 


Neutrophils #    (1.3-7.7)  k/uL


 


Lymphocytes #    (1.0-4.8)  k/uL


 


Sodium    (137-145)  mmol/L


 


Potassium    (3.5-5.1)  mmol/L


 


Creatinine    (0.52-1.04)  mg/dL


 


Glucose    (74-99)  mg/dL


 


POC Glucose (mg/dL)  128 H  128 H  (75-99)  mg/dL


 


Total Protein    (6.3-8.2)  g/dL


 


Albumin    (3.5-5.0)  g/dL








                      Microbiology - Last 24 Hours (Table)











 19 17:18 Blood Culture - Final





 Blood    No Growth after 144 hours














Assessment and Plan


Assessment: 


Severe protein calorie malnutrition with cachexia and BMI of 14.9


-Attempted PEG tube on.  Unsuccessful secondary to severe inflammation and 

scar tissue.  Would be able to have open gastrostomy tube on 


- Needs to be monitored closely for signs of refeeding syndrome


-Dietary recommendations





Hypovolemic hyponatremia with resultant metabolic encephalopathy, improving 


-IV fluids completed, on TPN


-Nephrology recommendations


-Status post 3% normal saline





Acute hypoxic respiratory failure secondary to Pneumonia, possible gram 

negative, likely aspiration


-Continue with Zosyn, Levaquin


-Pulmonary hygiene


-Follow chest x-ray until clear


-Pulmonary recommendations appreciated





Pulmonary lymphadenopathy, likely secondary to known prior laryngeal cancer


-Await records from [facility]


-Will need urgent follow-up with her oncologist at [facility]





Hypokalemia


- repleace IV


- TPN





Anemia, likely dilutional


-Stable


-Outpatient follow-up


-Follow intermittent CBC





Contraction alkalosis, resolved


Hypomagnesemia, resolved





DVT prophylaxis: SCDs


Discussed with: Patient, nursing, social work


Anticipated discharge: 3-4 days


Anticipated discharge place: home vs SNF


A total of 35 minutes was spent on the care of this complex patient more than 

50% of the time was spent in counseling and care coordination.

## 2019-09-20 LAB
ALBUMIN SERPL-MCNC: 2.8 G/DL (ref 3.5–5)
ALP SERPL-CCNC: 75 U/L (ref 38–126)
ALT SERPL-CCNC: 27 U/L (ref 9–52)
ANION GAP SERPL CALC-SCNC: 6 MMOL/L
AST SERPL-CCNC: 22 U/L (ref 14–36)
BASOPHILS # BLD AUTO: 0 K/UL (ref 0–0.2)
BASOPHILS NFR BLD AUTO: 0 %
BUN SERPL-SCNC: 16 MG/DL (ref 7–17)
CALCIUM SPEC-MCNC: 8.8 MG/DL (ref 8.4–10.2)
CHLORIDE SERPL-SCNC: 101 MMOL/L (ref 98–107)
CO2 SERPL-SCNC: 28 MMOL/L (ref 22–30)
EOSINOPHIL # BLD AUTO: 0 K/UL (ref 0–0.7)
EOSINOPHIL NFR BLD AUTO: 1 %
ERYTHROCYTE [DISTWIDTH] IN BLOOD BY AUTOMATED COUNT: 3.11 M/UL (ref 3.8–5.4)
ERYTHROCYTE [DISTWIDTH] IN BLOOD: 14.1 % (ref 11.5–15.5)
GLUCOSE BLD-MCNC: 120 MG/DL (ref 75–99)
GLUCOSE BLD-MCNC: 125 MG/DL (ref 75–99)
GLUCOSE BLD-MCNC: 137 MG/DL (ref 75–99)
GLUCOSE BLD-MCNC: 96 MG/DL (ref 75–99)
GLUCOSE SERPL-MCNC: 122 MG/DL (ref 74–99)
HCT VFR BLD AUTO: 30.7 % (ref 34–46)
HGB BLD-MCNC: 9.9 GM/DL (ref 11.4–16)
LYMPHOCYTES # SPEC AUTO: 0.1 K/UL (ref 1–4.8)
LYMPHOCYTES NFR SPEC AUTO: 1 %
MAGNESIUM SPEC-SCNC: 2 MG/DL (ref 1.6–2.3)
MCH RBC QN AUTO: 31.9 PG (ref 25–35)
MCHC RBC AUTO-ENTMCNC: 32.4 G/DL (ref 31–37)
MCV RBC AUTO: 98.5 FL (ref 80–100)
MONOCYTES # BLD AUTO: 0.3 K/UL (ref 0–1)
MONOCYTES NFR BLD AUTO: 4 %
NEUTROPHILS # BLD AUTO: 7 K/UL (ref 1.3–7.7)
NEUTROPHILS NFR BLD AUTO: 94 %
PLATELET # BLD AUTO: 334 K/UL (ref 150–450)
POTASSIUM SERPL-SCNC: 3.9 MMOL/L (ref 3.5–5.1)
PROT SERPL-MCNC: 5.8 G/DL (ref 6.3–8.2)
SODIUM SERPL-SCNC: 135 MMOL/L (ref 137–145)
WBC # BLD AUTO: 7.5 K/UL (ref 3.8–10.6)

## 2019-09-20 RX ADMIN — IPRATROPIUM BROMIDE AND ALBUTEROL SULFATE SCH ML: .5; 3 SOLUTION RESPIRATORY (INHALATION) at 15:02

## 2019-09-20 RX ADMIN — HYDROMORPHONE HYDROCHLORIDE PRN MG: 1 INJECTION, SOLUTION INTRAMUSCULAR; INTRAVENOUS; SUBCUTANEOUS at 23:43

## 2019-09-20 RX ADMIN — CEFAZOLIN SCH: 330 INJECTION, POWDER, FOR SOLUTION INTRAMUSCULAR; INTRAVENOUS at 16:27

## 2019-09-20 RX ADMIN — METHYLPREDNISOLONE SODIUM SUCCINATE SCH MG: 40 INJECTION, POWDER, FOR SOLUTION INTRAMUSCULAR; INTRAVENOUS at 16:37

## 2019-09-20 RX ADMIN — NOREPINEPHRINE BITARTRATE SCH: 1 INJECTION, SOLUTION, CONCENTRATE INTRAVENOUS at 06:26

## 2019-09-20 RX ADMIN — IPRATROPIUM BROMIDE AND ALBUTEROL SULFATE SCH ML: .5; 3 SOLUTION RESPIRATORY (INHALATION) at 07:11

## 2019-09-20 RX ADMIN — INSULIN ASPART SCH: 100 INJECTION, SOLUTION INTRAVENOUS; SUBCUTANEOUS at 12:14

## 2019-09-20 RX ADMIN — PIPERACILLIN AND TAZOBACTAM SCH MLS/HR: 3; .375 INJECTION, POWDER, FOR SOLUTION INTRAVENOUS at 16:37

## 2019-09-20 RX ADMIN — IPRATROPIUM BROMIDE AND ALBUTEROL SULFATE SCH ML: .5; 3 SOLUTION RESPIRATORY (INHALATION) at 19:03

## 2019-09-20 RX ADMIN — HYDROMORPHONE HYDROCHLORIDE PRN MG: 1 INJECTION, SOLUTION INTRAMUSCULAR; INTRAVENOUS; SUBCUTANEOUS at 18:37

## 2019-09-20 RX ADMIN — POTASSIUM CHLORIDE SCH MLS/HR: 7.46 INJECTION, SOLUTION INTRAVENOUS at 09:21

## 2019-09-20 RX ADMIN — INSULIN ASPART SCH: 100 INJECTION, SOLUTION INTRAVENOUS; SUBCUTANEOUS at 06:59

## 2019-09-20 RX ADMIN — INSULIN ASPART SCH: 100 INJECTION, SOLUTION INTRAVENOUS; SUBCUTANEOUS at 23:36

## 2019-09-20 RX ADMIN — PIPERACILLIN AND TAZOBACTAM SCH MLS/HR: 3; .375 INJECTION, POWDER, FOR SOLUTION INTRAVENOUS at 09:24

## 2019-09-20 RX ADMIN — PANTOPRAZOLE SODIUM SCH MG: 40 INJECTION, POWDER, FOR SOLUTION INTRAVENOUS at 09:36

## 2019-09-20 RX ADMIN — LEUCINE, PHENYLALANINE, LYSINE, METHIONINE, ISOLEUCINE, VALINE, HISTIDINE, THREONINE, TRYPTOPHAN, ALANINE, GLYCINE, ARGININE, PROLINE, SERINE, TYROSINE, DEXTROSE SCH MLS/HR: 365; 280; 290; 200; 300; 290; 240; 210; 90; 1035; 515; 575; 340; 250; 20; 15 INJECTION INTRAVENOUS at 15:12

## 2019-09-20 RX ADMIN — METHYLPREDNISOLONE SODIUM SUCCINATE SCH MG: 40 INJECTION, POWDER, FOR SOLUTION INTRAMUSCULAR; INTRAVENOUS at 23:42

## 2019-09-20 RX ADMIN — METHYLPREDNISOLONE SODIUM SUCCINATE SCH MG: 40 INJECTION, POWDER, FOR SOLUTION INTRAMUSCULAR; INTRAVENOUS at 09:24

## 2019-09-20 RX ADMIN — LEVOFLOXACIN SCH MLS/HR: 5 INJECTION, SOLUTION INTRAVENOUS at 09:24

## 2019-09-20 RX ADMIN — POTASSIUM CHLORIDE SCH MLS/HR: 7.46 INJECTION, SOLUTION INTRAVENOUS at 09:15

## 2019-09-20 RX ADMIN — IPRATROPIUM BROMIDE AND ALBUTEROL SULFATE SCH ML: .5; 3 SOLUTION RESPIRATORY (INHALATION) at 11:00

## 2019-09-20 RX ADMIN — HEPARIN SODIUM SCH UNIT: 5000 INJECTION, SOLUTION INTRAVENOUS; SUBCUTANEOUS at 09:23

## 2019-09-20 RX ADMIN — PIPERACILLIN AND TAZOBACTAM SCH MLS/HR: 3; .375 INJECTION, POWDER, FOR SOLUTION INTRAVENOUS at 23:43

## 2019-09-20 RX ADMIN — INSULIN ASPART SCH: 100 INJECTION, SOLUTION INTRAVENOUS; SUBCUTANEOUS at 18:24

## 2019-09-20 RX ADMIN — IPRATROPIUM BROMIDE AND ALBUTEROL SULFATE PRN ML: .5; 3 SOLUTION RESPIRATORY (INHALATION) at 23:08

## 2019-09-20 RX ADMIN — SOYBEAN OIL SCH MLS/HR: 20 INJECTION, SOLUTION INTRAVENOUS at 16:37

## 2019-09-20 RX ADMIN — HEPARIN SODIUM SCH UNIT: 5000 INJECTION, SOLUTION INTRAVENOUS; SUBCUTANEOUS at 23:43

## 2019-09-20 RX ADMIN — HEPARIN SODIUM SCH UNIT: 5000 INJECTION, SOLUTION INTRAVENOUS; SUBCUTANEOUS at 16:37

## 2019-09-20 NOTE — P.PN
Subjective


Progress Note Date: 19 (delayed charting seen at 0900)


Principal diagnosis: 


Weakness and sleeping more than often than normal





Patient is a 53-year-old  female with a past medical history of davi

ngeal cancer status post chemo and radiation, and history of tobacco abuse who 

presented to the emergency department with weakness and difficulty staying 

awake.    Patient underwent treatment for laryngeal cancer in 2019.  

Since that point in time she been having difficulty swallowing in his been 

tolerating pudding.  She's become severely cachectic and weak.  In the ER she 

underwent an extensive evaluation.  She was found to have a sodium of 111, she 

underwent an acute abdominal series which was nonspecific.  She underwent a CT 

of the chest which showed emphysema with extensive pulmonary interstitial 

infiltrates as well as multiple enlarged lymph nodes.  In the emergency 

department she was having respiratory distress and appeared stridorous was 

subsequently admitted to the ICU.  Initially she was 90% on 4 L nasal cannula 

and was found to be significantly acidotic with a pH of 7.13.  She was 

subsequently intubated and placed on mechanical ventilation.  She was seen by 

critical care.  Due to her hypotension she required norepinephrine.  She was 

also seen by nephrology due to her low sodiums.  She did receive 3% normal 

saline due to her altered mentation.  She was started on empiric IV antibiotics 

for possible pneumonia of Levaquin and Zosyn.  She was transitioned from 3% sa

line to normal saline.  She was extubated on  without any difficulties.  She

then developed significant respiratory distress on the morning of  and 

required BiPAP, which was able to be weaned by the morning of . Patient 

amenable for -Leandro, attempted placement on  unable to do so due to scar 

tissue and inflammation. Would be able to have open G tube, failed MBSS, plan 

for open G tube . 





Patient seen and examined at bedside. Anxious for modified barium swallow marybeth

dies today.  No chest pain.  No shortness of breath.  No nausea.  No vomiting.  

Had remained making progress in decreasing oxygen requirements.  Would be 

amenable to open J-tube if fails swallow study.  She realizes this does not have

to be permanent but would allow her to have adequate oral intake while seeking 

therapy for her swallowing difficulties.





Objective





- Vital Signs


Vital signs: 


                                   Vital Signs











Temp  98.1 F   19 16:00


 


Pulse  104 H  19 17:00


 


Resp  19   19 17:00


 


BP  127/91   19 17:00


 


Pulse Ox  94 L  19 17:00








                                 Intake & Output











 19





 18:59 06:59 18:59


 


Intake Total 1275 945 907.1


 


Output Total 2740 1925 1250


 


Balance -1465 -980 -342.9


 


Weight  38.6 kg 38.6 kg


 


Intake:   


 


  IV 1275 945 541.6


 


    .9 kvo 230 230 200


 


    Fat Emulsion 20% 250 ml @  90 41.6





    20.833 mls/hr IV MoWeFr@   





    [address] [facility] Rx#:[id number]   


 


    Magnesium Sulfate-D5w Pmx 200  





    1 gm In Dextrose/Water 1   





    100ml.bag @ 100 mls/hr   





    IVPB Q1H [facility] Rx#:   





    [id number]   


 


    Mvi, Adult No.4 with Vit 270 300 





    K 10 ml Trace (Conc-1Ml/   





    Dose) 1 ml In Amino Acid   





    5%-D15w+Lytes*E* 1,000 ml   





    @ 30 mls/hr IV .Q24H [facility]   





    Rx#:[id number]   


 


    Piperacillin-Tazobactam 3 175 75 200





    .375 gm In Sodium   





    Chloride 0.9% 100 ml @ 25   





    mls/hr IVPB Q8HR [facility] Rx#   





    :[id number]   


 


    Potassium Chloride 20 meq 300 250 100





    In Water For Injection 1   





    100ml.bag @ 50 mls/hr   





    IVPB Q2H [facility] Rx#:   





    [id number]   


 


  Intake, IV Titration   365.5





  Amount   


 


    Levofloxacin 500Mg-D5w   100





    Pmx 500 mg In Dextrose/   





    Water 1 100ml.bag @ 100   





    mls/hr IVPB Q24H [facility] Rx#:   





    [id number]   


 


    Mvi, Adult No.4 with Vit   265.5





    K 10 ml Trace (Conc-1Ml/   





    Dose) 1 ml Parenteral   





    Electrolytes 20 ml   





    Potassium Chloride 40 meq   





    In Amino Acid 5%-D15w 1,   





    000 ml @ 30 mls/hr IV .   





    Q24H [facility] Rx#:[id number]   


 


Output:   


 


  Urine 2740 1925 1250


 


Other:   


 


  Voiding Method Indwelling Catheter Indwelling Catheter Indwelling Catheter








                       ABP, PAP, CO, CI - Last Documented











Arterial Blood Pressure        75/63

















- Exam


General:non toxic, cachectic, appears older than stated age, temporal wasting


Derm: warm, dry


Head: atraumatic, normocephalic, symmetric


Eyes: EOMI, no lid lag, anicteric sclera


Mouth: no lip lesion, mucus membranes dry


Cardiovascular: S1S2 reg, no murmur, positive posterior tibial pulse bilateral, 


Lungs: Ronchi bilateral, no accessory muscle use


Abdominal: soft,  nontender to palpation, no guarding, no appreciable 

organomegaly


Ext: + gross muscle atrophy, trace edema, no contractures


Neuro:  CN II-XI grossly intact, no focal neuro deficits


Psych: Alert, oriented, flat affect 








- Labs


CBC & Chem 7: 


                                 19 04:35





                                 19 04:35


Labs: 


                  Abnormal Lab Results - Last 24 Hours (Table)











  19 Range/Units





  18:30 23:01 04:35 


 


RBC     (3.80-5.40)  m/uL


 


Hgb     (11.4-16.0)  gm/dL


 


Hct     (34.0-46.0)  %


 


Lymphocytes #     (1.0-4.8)  k/uL


 


Sodium  136 L   135 L  (137-145)  mmol/L


 


Potassium  3.3 L    (3.5-5.1)  mmol/L


 


Creatinine  0.25 L   0.26 L  (0.52-1.04)  mg/dL


 


Glucose  103 H   122 H  (74-99)  mg/dL


 


POC Glucose (mg/dL)   111 H   (75-99)  mg/dL


 


Total Protein    5.8 L  (6.3-8.2)  g/dL


 


Albumin    2.8 L  (3.5-5.0)  g/dL














  19 Range/Units





  04:35 06:00 12:11 


 


RBC  3.11 L    (3.80-5.40)  m/uL


 


Hgb  9.9 L    (11.4-16.0)  gm/dL


 


Hct  30.7 L    (34.0-46.0)  %


 


Lymphocytes #  0.1 L    (1.0-4.8)  k/uL


 


Sodium     (137-145)  mmol/L


 


Potassium     (3.5-5.1)  mmol/L


 


Creatinine     (0.52-1.04)  mg/dL


 


Glucose     (74-99)  mg/dL


 


POC Glucose (mg/dL)   137 H  125 H  (75-99)  mg/dL


 


Total Protein     (6.3-8.2)  g/dL


 


Albumin     (3.5-5.0)  g/dL














Assessment and Plan


Assessment: 


Severe protein calorie malnutrition with cachexia and BMI of 14.9


-Attempted PEG tube on.  Unsuccessful secondary to severe inflammation and 

scar tissue.  Plan for open gastrostomy tube on 


- Needs to be monitored closely for signs of refeeding syndrome


-Dietary recommendations





Dysphagia


- failed MBS


- Open G tube 





Hypovolemic hyponatremia with resultant metabolic encephalopathy, improving 


-IV fluids completed, on TPN


-Nephrology recommendations


-Status post 3% normal saline





Acute hypoxic respiratory failure secondary to Pneumonia, possible gram 

negative, likely aspiration


-Continue with Zosyn, Levaquin D # 7


-Pulmonary hygiene


-Follow chest x-ray until clear


-Pulmonary recommendations appreciated





Pulmonary lymphadenopathy, likely secondary to known prior laryngeal cancer


-Will need urgent follow-up with her oncologist at [facility]





Hypokalemia


- repleace IV


- TPN





Anemia, likely dilutional


-Stable


-Outpatient follow-up


-Follow intermittent CBC





Contraction alkalosis, resolved


Hypomagnesemia, resolved





DVT prophylaxis: SCDs


Discussed with: Patient, nursing, Dr. Gregorio


Anticipated discharge: 3-4 days


Anticipated discharge place: home vs SNF


A total of 35 minutes was spent on the care of this complex patient more than 

50% of the time was spent in counseling and care coordination.

## 2019-09-20 NOTE — PN
PROGRESS NOTE



PULMONARY/CRITICAL CARE PROGRESS NOTE:



DATE OF SERVICE:

September 20, 2019



A 53-year-old female who was admitted way back on September 12, 2019.  She initially

came in with mental status changes, thought to be related to metabolic encephalopathy.

In addition, she had severe hyponatremia with a sodium of 111.  She also developed

acute hypoxemic and hypercapnic respiratory failure secondary to aspiration pneumonia

and was intubated on September 13th, but was able to be successfully extubated on the

14th.  She does have a history of laryngeal carcinoma, status post chemoradiation, with

her last treatments being in January 2019 over the last 48 to 72 hours, she has had

significant improvement.  Yesterday, they attempted to place a PEG tube.  Because of

her radiation to the laryngeal carcinoma, they were unable to do an EGD.  She is on 2 L

nasal cannula.  She is getting saline at KVO.  She is getting TPN at 30 mL an hour.  We

are going to have speech pathology do a modified barium swallow on her today to see if

we cannot  supplement her TPN with some oral feeds.  Her overall situation is

complicated by the fact that she is profoundly cachectic and profoundly malnourished

both in terms of protein and calories.



She is clinically feeling much better though.  She is doing much better.  No additional

recommendations from that standpoint are made.



PHYSICAL EXAMINATION:

VITAL SIGNS: Current vital signs are reviewed. Temperature is 98.3, heart rate 90,

respiratory rate 18, blood pressure 137/88, mean 104 and 2 L saturation is 94%.

GENERAL: Appears in no acute distress.

HEENT: Examination is grossly unremarkable.

NECK:  Supple.  Full range of motion.  No adenopathy.  Neck veins are flat.

CARDIOVASCULAR: Examination reveals regular rhythm and rate.  Heart rate 85.  S1, S2

normal.

LUNGS:  Reveal a few scattered rhonchi.  No wheezes or crackles.

ABDOMEN:  Soft.  Bowel sounds are heard.

EXTREMITIES are intact.  No cyanosis, clubbing, or edema.

SKIN: Without rash.

NEUROLOGIC: Examination is brief but nonfocal.



A chest x-ray continues to show bibasilar infiltrates, atelectasis, and bibasilar

effusions.  In my way of looking at the x-ray, I think over time, the x-ray has

improved.



LAB DATA:

Lab data is reviewed.  White count 7.5, hemoglobin 9.9, hematocrit 30.7, platelet count

334,000. Sodium 135, potassium 3.9, chloride 101, CO2 of 28. Anion gap of 6. BUN and

creatinine were 16 and 0.26.  Albumin is only 2.8.



Microbiologic studies including blood and sputum are negative.



MEDICATIONS:

Medications are reviewed.  They all seem pretty appropriate.  She remains on Zosyn and

Levaquin as antibiotics.



ASSESSMENT:

1. Acute metabolic encephalopathy, in large part related to the patient's profound

    hyponatremia, which is hypovolemic hyponatremia, which is significantly improved.

2. Acute hypoxemic and hypercapnic respiratory failure secondary to aspiration

    pneumonia, status post intubation on the 13th and extubation on 14th of September.

3. Ongoing hypoxemic respiratory failure, which is improved dramatically.  The

    patient's oxygen requirements have significantly declined.  She is currently only

    on 2 L.

4. Severe hypovolemic hyponatremia, resolved.

5. History of laryngeal carcinoma, status post chemoradiation, and possibly

    metastatic.

6. Severe protein calorie malnutrition with inanition.

7. Anorexia/cachexia syndrome.

8. History of previous tobacco dependence.

9. History of anemia of chronic disease.

10.Severe electrolyte disturbance, resolved.

11.Hypoalbuminemia.

12.Contraction alkalosis, improved.

13.Significantly poor dentition with caries.



PLAN:

EGD could not be accomplished.  This is because of the scarring in the throat area from

her radiation for the laryngeal carcinoma.  We will have Speech Pathology do

appropriate swallow evaluation with modified barium swallow.  She remains on TPN.

Additional recommendations and suggestions are forthcoming.  Chest x-ray is slowly

improved.  We will continue to follow.  Prognosis is guarded.  She did use a BiPAP last

night.  She should have aspiration precautions.  Head of bed elevated at all times.





MMODL / IJN: [id number] / Job#: [id number]

## 2019-09-20 NOTE — FL
EXAMINATION TYPE: FL barium swallow w video

 

DATE OF EXAM: 9/20/2019

 

COMPARISON: NONE

 

HISTORY: Difficulty with swallowing

 

TECHNIQUE: Fluoroscopy.

 

FINDINGS:  Fluoroscopic guidance was provided for the procedure performed in conjunction with the [facility]– [place]note 2] pathology department.  Please see complete report forthcoming from the Speech Pathology departmen
t.  Various consistencies from thin liquid to solids were administered.

 

Fluoroscopy time 52 seconds.

Number of images: 0.

 

Aspiration was evident with thin liquids, nectar thick liquids, and pudding thick consistencies.

 

There is delay in bolus formation. Some residuals may be present.

 

IMPRESSION: 

1. Aspiration with thin, nectar thick and pudding consistencies. The procedure was terminated at this
 point exam.

## 2019-09-20 NOTE — P.PN
Progress Note - Text


Progress Note Date: 09/20/19





Patient was unable to have her PEG tube placed yesterday.  She will need to have

an open gastrostomy tube placement due to her laryngeal edema.  We will 

tentative schedule her for surgery on Monday.

## 2019-09-20 NOTE — XR
EXAMINATION TYPE: XR chest 1V portable

 

DATE OF EXAM: 9/20/2019

 

COMPARISON: Prior chest x-ray 9/19/2019

 

HISTORY: Adventitious lung sounds, abnormal chest x-ray

 

TECHNIQUE: Single frontal view of the chest is obtained.

 

FINDINGS:  There is no significant interval change. Hemidiaphragms are secured. Pleural parenchymal c
hanges show a similar appearance. Central venous catheters are stable, there are overlying cardiac le
ads. No pneumothorax. Heart is unchanged.

 

IMPRESSION:  Correlate for pneumonia, edema, atelectasis, possible underlying tumor. Cannot exclude e
ffusions.

## 2019-09-21 LAB
ALBUMIN SERPL-MCNC: 2.9 G/DL (ref 3.5–5)
ALP SERPL-CCNC: 85 U/L (ref 38–126)
ALT SERPL-CCNC: 47 U/L (ref 9–52)
ANION GAP SERPL CALC-SCNC: 7 MMOL/L
AST SERPL-CCNC: 30 U/L (ref 14–36)
BASOPHILS # BLD AUTO: 0 K/UL (ref 0–0.2)
BASOPHILS NFR BLD AUTO: 0 %
BUN SERPL-SCNC: 11 MG/DL (ref 7–17)
CALCIUM SPEC-MCNC: 8.7 MG/DL (ref 8.4–10.2)
CHLORIDE SERPL-SCNC: 97 MMOL/L (ref 98–107)
CO2 SERPL-SCNC: 29 MMOL/L (ref 22–30)
EOSINOPHIL # BLD AUTO: 0 K/UL (ref 0–0.7)
EOSINOPHIL NFR BLD AUTO: 0 %
ERYTHROCYTE [DISTWIDTH] IN BLOOD BY AUTOMATED COUNT: 3.21 M/UL (ref 3.8–5.4)
ERYTHROCYTE [DISTWIDTH] IN BLOOD: 13.6 % (ref 11.5–15.5)
GLUCOSE BLD-MCNC: 104 MG/DL (ref 75–99)
GLUCOSE BLD-MCNC: 119 MG/DL (ref 75–99)
GLUCOSE BLD-MCNC: 134 MG/DL (ref 75–99)
GLUCOSE BLD-MCNC: 97 MG/DL (ref 75–99)
GLUCOSE BLD-MCNC: 98 MG/DL (ref 75–99)
GLUCOSE SERPL-MCNC: 116 MG/DL (ref 74–99)
HCT VFR BLD AUTO: 31 % (ref 34–46)
HGB BLD-MCNC: 10.1 GM/DL (ref 11.4–16)
LYMPHOCYTES # SPEC AUTO: 0.1 K/UL (ref 1–4.8)
LYMPHOCYTES NFR SPEC AUTO: 1 %
MAGNESIUM SPEC-SCNC: 1.7 MG/DL (ref 1.6–2.3)
MCH RBC QN AUTO: 31.4 PG (ref 25–35)
MCHC RBC AUTO-ENTMCNC: 32.5 G/DL (ref 31–37)
MCV RBC AUTO: 96.5 FL (ref 80–100)
MONOCYTES # BLD AUTO: 0.3 K/UL (ref 0–1)
MONOCYTES NFR BLD AUTO: 3 %
NEUTROPHILS # BLD AUTO: 8.3 K/UL (ref 1.3–7.7)
NEUTROPHILS NFR BLD AUTO: 95 %
PLATELET # BLD AUTO: 330 K/UL (ref 150–450)
POTASSIUM SERPL-SCNC: 3.4 MMOL/L (ref 3.5–5.1)
PROT SERPL-MCNC: 5.8 G/DL (ref 6.3–8.2)
SODIUM SERPL-SCNC: 133 MMOL/L (ref 137–145)
WBC # BLD AUTO: 8.8 K/UL (ref 3.8–10.6)

## 2019-09-21 RX ADMIN — IPRATROPIUM BROMIDE AND ALBUTEROL SULFATE SCH: .5; 3 SOLUTION RESPIRATORY (INHALATION) at 20:17

## 2019-09-21 RX ADMIN — MAGNESIUM SULFATE IN DEXTROSE SCH MLS/HR: 10 INJECTION, SOLUTION INTRAVENOUS at 09:57

## 2019-09-21 RX ADMIN — POTASSIUM CHLORIDE SCH MLS/HR: 14.9 INJECTION, SOLUTION INTRAVENOUS at 11:27

## 2019-09-21 RX ADMIN — HEPARIN SODIUM SCH UNIT: 5000 INJECTION, SOLUTION INTRAVENOUS; SUBCUTANEOUS at 23:50

## 2019-09-21 RX ADMIN — INSULIN ASPART SCH UNIT: 100 INJECTION, SOLUTION INTRAVENOUS; SUBCUTANEOUS at 11:59

## 2019-09-21 RX ADMIN — PIPERACILLIN AND TAZOBACTAM SCH MLS/HR: 3; .375 INJECTION, POWDER, FOR SOLUTION INTRAVENOUS at 09:13

## 2019-09-21 RX ADMIN — METHYLPREDNISOLONE SODIUM SUCCINATE SCH MG: 40 INJECTION, POWDER, FOR SOLUTION INTRAMUSCULAR; INTRAVENOUS at 15:06

## 2019-09-21 RX ADMIN — IPRATROPIUM BROMIDE AND ALBUTEROL SULFATE SCH ML: .5; 3 SOLUTION RESPIRATORY (INHALATION) at 07:46

## 2019-09-21 RX ADMIN — MAGNESIUM SULFATE IN DEXTROSE SCH MLS/HR: 10 INJECTION, SOLUTION INTRAVENOUS at 09:09

## 2019-09-21 RX ADMIN — HEPARIN SODIUM SCH UNIT: 5000 INJECTION, SOLUTION INTRAVENOUS; SUBCUTANEOUS at 15:05

## 2019-09-21 RX ADMIN — PANTOPRAZOLE SODIUM SCH MG: 40 INJECTION, POWDER, FOR SOLUTION INTRAVENOUS at 09:11

## 2019-09-21 RX ADMIN — CEFAZOLIN SCH MLS/HR: 330 INJECTION, POWDER, FOR SOLUTION INTRAMUSCULAR; INTRAVENOUS at 14:44

## 2019-09-21 RX ADMIN — POTASSIUM CHLORIDE SCH MLS/HR: 14.9 INJECTION, SOLUTION INTRAVENOUS at 09:10

## 2019-09-21 RX ADMIN — INSULIN ASPART SCH: 100 INJECTION, SOLUTION INTRAVENOUS; SUBCUTANEOUS at 06:34

## 2019-09-21 RX ADMIN — INSULIN ASPART SCH: 100 INJECTION, SOLUTION INTRAVENOUS; SUBCUTANEOUS at 23:38

## 2019-09-21 RX ADMIN — PIPERACILLIN AND TAZOBACTAM SCH MLS/HR: 3; .375 INJECTION, POWDER, FOR SOLUTION INTRAVENOUS at 09:19

## 2019-09-21 RX ADMIN — HEPARIN SODIUM SCH UNIT: 5000 INJECTION, SOLUTION INTRAVENOUS; SUBCUTANEOUS at 09:13

## 2019-09-21 RX ADMIN — IPRATROPIUM BROMIDE AND ALBUTEROL SULFATE SCH ML: .5; 3 SOLUTION RESPIRATORY (INHALATION) at 15:36

## 2019-09-21 RX ADMIN — LEUCINE, PHENYLALANINE, LYSINE, METHIONINE, ISOLEUCINE, VALINE, HISTIDINE, THREONINE, TRYPTOPHAN, ALANINE, GLYCINE, ARGININE, PROLINE, SERINE, TYROSINE, DEXTROSE SCH MLS/HR: 365; 280; 290; 200; 300; 290; 240; 210; 90; 1035; 515; 575; 340; 250; 20; 15 INJECTION INTRAVENOUS at 14:45

## 2019-09-21 RX ADMIN — INSULIN ASPART SCH: 100 INJECTION, SOLUTION INTRAVENOUS; SUBCUTANEOUS at 17:33

## 2019-09-21 RX ADMIN — METHYLPREDNISOLONE SODIUM SUCCINATE SCH MG: 40 INJECTION, POWDER, FOR SOLUTION INTRAMUSCULAR; INTRAVENOUS at 23:51

## 2019-09-21 RX ADMIN — LEVOFLOXACIN SCH MLS/HR: 5 INJECTION, SOLUTION INTRAVENOUS at 09:53

## 2019-09-21 RX ADMIN — PIPERACILLIN AND TAZOBACTAM SCH MLS/HR: 3; .375 INJECTION, POWDER, FOR SOLUTION INTRAVENOUS at 23:51

## 2019-09-21 RX ADMIN — METHYLPREDNISOLONE SODIUM SUCCINATE SCH MG: 40 INJECTION, POWDER, FOR SOLUTION INTRAMUSCULAR; INTRAVENOUS at 09:13

## 2019-09-21 RX ADMIN — IPRATROPIUM BROMIDE AND ALBUTEROL SULFATE SCH ML: .5; 3 SOLUTION RESPIRATORY (INHALATION) at 11:08

## 2019-09-21 RX ADMIN — HYDROMORPHONE HYDROCHLORIDE PRN MG: 1 INJECTION, SOLUTION INTRAMUSCULAR; INTRAVENOUS; SUBCUTANEOUS at 23:49

## 2019-09-21 NOTE — P.PN
Subjective


Progress Note Date: 19


Principal diagnosis: 


Weakness and sleeping more than often than normal





Patient is a 53-year-old  female with a past medical history of 

laryngeal cancer status post chemo and radiation, and history of tobacco abuse 

who presented to the emergency department with weakness and difficulty staying 

awake.    Patient underwent treatment for laryngeal cancer in 2019.  S

shanta that point in time she been having difficulty swallowing in his been 

tolerating pudding.  She's become severely cachectic and weak.  In the ER she 

underwent an extensive evaluation.  She was found to have a sodium of 111, she 

underwent an acute abdominal series which was nonspecific.  She underwent a CT 

of the chest which showed emphysema with extensive pulmonary interstitial 

infiltrates as well as multiple enlarged lymph nodes.  In the emergency 

department she was having respiratory distress and appeared stridorous was 

subsequently admitted to the ICU.  Initially she was 90% on 4 L nasal cannula 

and was found to be significantly acidotic with a pH of 7.13.  She was 

subsequently intubated and placed on mechanical ventilation.  She was seen by 

critical care.  Due to her hypotension she required norepinephrine.  She was 

also seen by nephrology due to her low sodiums.  She did receive 3% normal 

saline due to her altered mentation.  She was started on empiric IV antibiotics 

for possible pneumonia of Levaquin and Zosyn.  She was transitioned from 3% 

saline to normal saline.  She was extubated on  without any difficulties.  

She then developed significant respiratory distress on the morning of  and 

required BiPAP, which was able to be weaned by the morning of . She was 

started on TPN on  due to no ability to feed and severe malnutrition.  

Patient amenable for -Leandro, attempted placement on  unable to do so due 

to scar tissue and inflammation. Would be able to have open G tube, failed MBSS,

plan for open G tube . 





Patient seen and examined at bedside. No chest pain, SOB, nausea or vomiting. 

Feeling tired. Feeling anxious no pain.  





Objective





- Vital Signs


Vital signs: 


                                   Vital Signs











Temp  98.3 F   19 04:00


 


Pulse  94   19 07:58


 


Resp  30 H  19 07:00


 


BP  129/92   19 07:00


 


Pulse Ox  88 L  19 07:00








                                 Intake & Output











 0919





 18:59 06:59 18:59


 


Intake Total 947.9 915.8 65


 


Output Total 1360 1835 125


 


Balance -412.1 -919.2 -60


 


Weight 38.6 kg  


 


Intake:   


 


  .4 915.8 65


 


    .9 kvo 220 240 20


 


    Fat Emulsion 20% 250 ml @ 62.4 180.8 





    20.833 mls/hr IV MoWeFr@   





    [address] [facility] Rx#:[id number]   


 


    Mvi, Adult No.4 with Vit  495 45





    K 10 ml Trace (Conc-1Ml/   





    Dose) 1 ml Parenteral   





    Electrolytes 20 ml   





    Potassium Chloride 40 meq   





    In Amino Acid 5%-D15w 1,   





    000 ml @ 45 mls/hr IV .   





    [id number] [facility] Rx#:[id number]   


 


    Piperacillin-Tazobactam 3 200  





    .375 gm In Sodium   





    Chloride 0.9% 100 ml @ 25   





    mls/hr IVPB Q8HR [facility] Rx#   





    :[id number]   


 


    Potassium Chloride 20 meq 100  





    In Water For Injection 1   





    100ml.bag @ 50 mls/hr   





    IVPB Q2H [facility] Rx#:   





    [id number]   


 


  Intake, IV Titration 365.5  





  Amount   


 


    Levofloxacin 500Mg-D5w 100  





    Pmx 500 mg In Dextrose/   





    Water 1 100ml.bag @ 100   





    mls/hr IVPB Q24H [facility] Rx#:   





    [id number]   


 


    Mvi, Adult No.4 with Vit 265.5  





    K 10 ml Trace (Conc-1Ml/   





    Dose) 1 ml Parenteral   





    Electrolytes 20 ml   





    Potassium Chloride 40 meq   





    In Amino Acid 5%-D15w 1,   





    000 ml @ 30 mls/hr IV .   





    Q24H [facility] Rx#:[id number]   


 


Output:   


 


  Urine 1360 1835 125


 


Other:   


 


  Voiding Method Indwelling Catheter Indwelling Catheter 








                       ABP, PAP, CO, CI - Last Documented











Arterial Blood Pressure        75/63

















- Exam


General: non toxic, cachectic, appears older than stated age, temporal wasting


Derm: multiple areas of echymosis, warm, dry


Head: atraumatic, normocephalic, symmetric


Eyes: EOMI, no lid lag, anicteric sclera


Mouth: no lip lesion, mucus membranes dry


Cardiovascular: S1S2 reg, no murmur, positive posterior tibial pulse bilateral, 


Lungs: Ronchi bilateral, no accessory muscle use


Abdominal: soft,  nontender to palpation, no guarding, no appreciable 

organomegaly


Ext: + gross muscle atrophy, trace edema, no contractures


Neuro:  CN II-XI grossly intact, no focal neuro deficits


Psych: Alert, oriented, flat affect 








- Labs


CBC & Chem 7: 


                                 19 05:10





                                 19 05:10


Labs: 


                  Abnormal Lab Results - Last 24 Hours (Table)











  19 Range/Units





  12:11 23:36 05:10 


 


RBC    3.21 L  (3.80-5.40)  m/uL


 


Hgb    10.1 L  (11.4-16.0)  gm/dL


 


Hct    31.0 L  (34.0-46.0)  %


 


Neutrophils #    8.3 H  (1.3-7.7)  k/uL


 


Lymphocytes #    0.1 L  (1.0-4.8)  k/uL


 


Sodium     (137-145)  mmol/L


 


Potassium     (3.5-5.1)  mmol/L


 


Chloride     ()  mmol/L


 


Creatinine     (0.52-1.04)  mg/dL


 


Glucose     (74-99)  mg/dL


 


POC Glucose (mg/dL)  125 H  120 H   (75-99)  mg/dL


 


Total Protein     (6.3-8.2)  g/dL


 


Albumin     (3.5-5.0)  g/dL














  19 Range/Units





  05:10 06:30 


 


RBC    (3.80-5.40)  m/uL


 


Hgb    (11.4-16.0)  gm/dL


 


Hct    (34.0-46.0)  %


 


Neutrophils #    (1.3-7.7)  k/uL


 


Lymphocytes #    (1.0-4.8)  k/uL


 


Sodium  133 L   (137-145)  mmol/L


 


Potassium  3.4 L   (3.5-5.1)  mmol/L


 


Chloride  97 L   ()  mmol/L


 


Creatinine  0.23 L   (0.52-1.04)  mg/dL


 


Glucose  116 H   (74-99)  mg/dL


 


POC Glucose (mg/dL)   119 H  (75-99)  mg/dL


 


Total Protein  5.8 L   (6.3-8.2)  g/dL


 


Albumin  2.9 L   (3.5-5.0)  g/dL














Assessment and Plan


Assessment: 


Severe protein calorie malnutrition with cachexia and BMI of 14.9 Associated 

with dysphagia 


-Attempted PEG tube on.  Unsuccessful secondary to severe inflammation and 

scar tissue.  Plan for open gastrostomy tube on 


- continue TPN 


- Dietary recommendations


- failed MBS


- Open G tube 





Hypovolemic hyponatremia with resultant metabolic encephalopathy, improving 


-IV fluids completed, on TPN


-Nephrology recommendations


-Status post 3% normal saline


- follow sodium level 





Acute hypoxic respiratory failure secondary to Pneumonia, possible gram 

negative, likely aspiration


-Continue with Zosyn, Levaquin D # 8


-Pulmonary hygiene


-Follow chest x-ray until clear


-Pulmonary recommendations appreciated





Pulmonary lymphadenopathy, likely secondary to known prior laryngeal cancer


-Will need urgent follow-up with her oncologist at [facility]





Hypokalemia


- repleace IV


- TPN





Anemia, likely dilutional


-Stable


-Outpatient follow-up


-Follow intermittent CBC





Contraction alkalosis, resolved


Hypomagnesemia, resolved





DVT prophylaxis: SCDs


Discussed with: Patient, nursing, Dr. Gregorio


Anticipated discharge: 3-4 days


Anticipated discharge place: home vs SNF


A total of 35 minutes was spent on the care of this complex patient more than 

50% of the time was spent in counseling and care coordination.

## 2019-09-21 NOTE — PN
PROGRESS NOTE



DATE OF SERVICE:

September 21, 2019.



This is a 53-year-old female who was admitted way back on September 12, 2019.  She

initially came in with mental status changes, thought to be related to metabolic

encephalopathy.  She also had a profound hyponatremia with a sodium of 111.  She also

developed acute hypoxemic hypercapnic respiratory failure.  She required a brief period

of time on the mechanical ventilator and having been intubated on the 13th and

extubated on September 14.  She does have a history of laryngeal carcinoma.  She is

status post chemoradiation with last treatment being in January 2019.  Over the last 3

or 4 days, she has improved dramatically.  The big issue right now is nutrition.  We

tried to do a PEG tube.  Unfortunately, she could not have an EGD performed because of

the scarring that has occurred in the airway and the upper throat area from her

radiation from the laryngeal carcinoma.  We are thinking about some sort of surgical

feeding tube at this point.  She is currently not receiving any supplemental oxygen.

Her IV saline at 20 mL an hour.  She is getting TPN at 45 mL an hour.  Her nutrition is

inadequate.  She is profoundly cachectic.  Anyway, she is improving from the pulmonary

status.  She was thought to have aspiration pneumonia and that has improved.  Chest x-

rays and have improved on a daily basis and her oxygenation has also improved.  We are

keeping her n.p.o. and she apparently just recently failed her modified barium swallow.



PHYSICAL EXAMINATION:

VITAL SIGNS: Current vital signs are reviewed. Temperature is 98.3.  Heart rate 90,

respiratory rate 20 to 25, blood pressure 124/92, mean 104, saturations are 94% on room

air.  Appears in no acute distress.

HEENT examination is grossly unremarkable.  Mucous membranes are moist.  No oral

lesions.

NECK:  Supple.  Full range of motion.  No adenopathy.  No neck vein distention.

CARDIOVASCULAR examination reveals regular rhythm and rate.  Heart rate about 90 beats

per minute.  S1, S2 normal.  She is in sinus rhythm.  No murmur.  S1, S2 normal.

LUNGS:  A few scattered coarse rhonchi.  No wheezes or crackles.  Breath sounds equal

bilaterally.

ABDOMEN:  Soft.  Bowel sounds are heard.

EXTREMITIES are intact.  No cyanosis, clubbing, or edema.

SKIN: Without rash.

NEUROLOGIC examination is nonfocal.



LAB WORK:

Includes a white count of 8.8, hemoglobin 10.1, hematocrit 31, platelet count normal at

330,000.  Sodium 133, potassium 3.4, chloride 97, CO2 of 29. Anion gap 7, BUN and

creatinine were 11 and 0.23.  Albumin is 2.9.



A chest x-ray was done today.  It shows improving infiltrates bilaterally.  She still

has bibasilar infiltrates, atelectasis or effusions.



Her modified barium swallow revealed evidence of aspiration with thin nectar thick and

pudding consistencies.



Medications are reviewed.



Microbiology is negative.



ASSESSMENT:

1. Acute metabolic encephalopathy in large part related to the patient's profound

    hypovolemic hyponatremia, which is significantly improved.  Admission sodium was

    111.  Current sodium is 133.

2. Acute hypoxemic hypercapnic respiratory failure secondary to aspiration pneumonia,

    much improved, status post intubation on September 13 and extubation on the 14th.

3. Ongoing hypoxemic respiratory failure, dramatically improved.  The patient has been

    weaned off oxygen therapy.

4. Severe hypovolemic hyponatremia, resolved.

5. History of laryngeal carcinoma, status post chemo radiation, possibly metastatic.

6. Severe protein calorie malnutrition with inundation.

7. Anorexia/cachexia syndrome.

8. History of previous tobacco dependence.

9. History of anemia of chronic disease.

10.Severe electrolyte disturbance, resolved.

11.Hypoalbuminemia.

12.Contraction alkalosis, improved.

13.Significantly poor dentition with dental caries.



PLAN:

The patient is getting TPN at 45 mL an hour.  She may need a surgical feeding tube done

early next week.  She could not have the PEG tube performed.  I suspect she has

significant radiation changes in the upper airway and throat area.  The patient will

have her medications reviewed.  Unnecessary medications will be discontinued.

Microbiologic studies are thus far negative.  We will continue with aspiration

precautions.  We will keep her n.p.o.  She failed a modified barium swallow.

Additional recommendations and suggestions are forthcoming.





MMODL / IJN: [id number] / Job#: [id number]

## 2019-09-21 NOTE — XR
EXAMINATION TYPE: XR chest 1V portable

 

DATE OF EXAM: 9/21/2019

 

HISTORY: PNA.

 

REFERENCE: Previous study dated 9/20/2019.

 

FINDINGS: There is a Mediport in place on the right. There is a left basilic PICC line in place. Its 
tip is in the right atrium.

 

There are bilateral effusions. There is underlying COPD. The heart is not enlarged. There is bibasila
r airspace disease.

 

IMPRESSION: 

 

NO SIGNIFICANT INTERVAL CHANGE IN CHEST.

## 2019-09-21 NOTE — P.PN
Subjective


Progress Note Date: 09/21/19


Principal diagnosis: 





Malnutrition





Patient without new complaints.  White blood cell count 8.8, hemoglobin 10.1.  

Denies abdominal pain.  No chest pain.





Objective





- Vital Signs


Vital signs: 


                                   Vital Signs











Temp  98.3 F   09/21/19 08:00


 


Pulse  97   09/21/19 08:00


 


Resp  22   09/21/19 08:00


 


BP  130/96   09/21/19 08:00


 


Pulse Ox  97   09/21/19 08:00








                                 Intake & Output











 09/20/19 09/21/19 09/21/19





 18:59 06:59 18:59


 


Intake Total 947.9 915.8 65


 


Output Total 1360 1835 125


 


Balance -412.1 -919.2 -60


 


Weight 38.6 kg  


 


Intake:   


 


  .4 915.8 65


 


    .9 kvo 220 240 20


 


    Fat Emulsion 20% 250 ml @ 62.4 180.8 





    20.833 mls/hr IV MoWeFr@   





    [address] [facility] Rx#:[id number]   


 


    Mvi, Adult No.4 with Vit  495 45





    K 10 ml Trace (Conc-1Ml/   





    Dose) 1 ml Parenteral   





    Electrolytes 20 ml   





    Potassium Chloride 40 meq   





    In Amino Acid 5%-D15w 1,   





    000 ml @ 45 mls/hr IV .   





    [id number] [facility] Rx#:[id number]   


 


    Piperacillin-Tazobactam 3 200  





    .375 gm In Sodium   





    Chloride 0.9% 100 ml @ 25   





    mls/hr IVPB Q8HR [facility] Rx#   





    :[id number]   


 


    Potassium Chloride 20 meq 100  





    In Water For Injection 1   





    100ml.bag @ 50 mls/hr   





    IVPB Q2H [facility] Rx#:   





    [id number]   


 


  Intake, IV Titration 365.5  





  Amount   


 


    Levofloxacin 500Mg-D5w 100  





    Pmx 500 mg In Dextrose/   





    Water 1 100ml.bag @ 100   





    mls/hr IVPB Q24H [facility] Rx#:   





    [id number]   


 


    Mvi, Adult No.4 with Vit 265.5  





    K 10 ml Trace (Conc-1Ml/   





    Dose) 1 ml Parenteral   





    Electrolytes 20 ml   





    Potassium Chloride 40 meq   





    In Amino Acid 5%-D15w 1,   





    000 ml @ 30 mls/hr IV .   





    Q24H [facility] Rx#:[id number]   


 


Output:   


 


  Urine 1360 1835 125


 


Other:   


 


  Voiding Method Indwelling Catheter Indwelling Catheter 








                       ABP, PAP, CO, CI - Last Documented











Arterial Blood Pressure        75/63

















- Exam





Abdomen: Soft, nontender, nondistended





- Labs


CBC & Chem 7: 


                                 09/21/19 05:10





                                 09/21/19 05:10


Labs: 


                  Abnormal Lab Results - Last 24 Hours (Table)











  09/20/19 09/20/19 09/21/19 Range/Units





  12:11 23:36 05:10 


 


RBC    3.21 L  (3.80-5.40)  m/uL


 


Hgb    10.1 L  (11.4-16.0)  gm/dL


 


Hct    31.0 L  (34.0-46.0)  %


 


Neutrophils #    8.3 H  (1.3-7.7)  k/uL


 


Lymphocytes #    0.1 L  (1.0-4.8)  k/uL


 


Sodium     (137-145)  mmol/L


 


Potassium     (3.5-5.1)  mmol/L


 


Chloride     ()  mmol/L


 


Creatinine     (0.52-1.04)  mg/dL


 


Glucose     (74-99)  mg/dL


 


POC Glucose (mg/dL)  125 H  120 H   (75-99)  mg/dL


 


Total Protein     (6.3-8.2)  g/dL


 


Albumin     (3.5-5.0)  g/dL














  09/21/19 09/21/19 Range/Units





  05:10 06:30 


 


RBC    (3.80-5.40)  m/uL


 


Hgb    (11.4-16.0)  gm/dL


 


Hct    (34.0-46.0)  %


 


Neutrophils #    (1.3-7.7)  k/uL


 


Lymphocytes #    (1.0-4.8)  k/uL


 


Sodium  133 L   (137-145)  mmol/L


 


Potassium  3.4 L   (3.5-5.1)  mmol/L


 


Chloride  97 L   ()  mmol/L


 


Creatinine  0.23 L   (0.52-1.04)  mg/dL


 


Glucose  116 H   (74-99)  mg/dL


 


POC Glucose (mg/dL)   119 H  (75-99)  mg/dL


 


Total Protein  5.8 L   (6.3-8.2)  g/dL


 


Albumin  2.9 L   (3.5-5.0)  g/dL














Assessment and Plan


(1) Severe protein-energy malnutrition


Narrative/Plan: 


Patient was severe malnutrition.  Plan open gastrostomy tube on Monday.


Current Visit: Yes   Status: Acute   Code(s): E43 - UNSPECIFIED SEVERE PROTEIN-

CALORIE MALNUTRITION   SNOMED Code(s): 047566947

## 2019-09-22 LAB
ALBUMIN SERPL-MCNC: 2.9 G/DL (ref 3.5–5)
ALP SERPL-CCNC: 70 U/L (ref 38–126)
ALT SERPL-CCNC: 45 U/L (ref 9–52)
ANION GAP SERPL CALC-SCNC: 7 MMOL/L
AST SERPL-CCNC: 26 U/L (ref 14–36)
BASOPHILS # BLD AUTO: 0 K/UL (ref 0–0.2)
BASOPHILS NFR BLD AUTO: 0 %
BUN SERPL-SCNC: 15 MG/DL (ref 7–17)
CALCIUM SPEC-MCNC: 8.6 MG/DL (ref 8.4–10.2)
CHLORIDE SERPL-SCNC: 98 MMOL/L (ref 98–107)
CO2 SERPL-SCNC: 26 MMOL/L (ref 22–30)
EOSINOPHIL # BLD AUTO: 0 K/UL (ref 0–0.7)
EOSINOPHIL NFR BLD AUTO: 0 %
ERYTHROCYTE [DISTWIDTH] IN BLOOD BY AUTOMATED COUNT: 3.09 M/UL (ref 3.8–5.4)
ERYTHROCYTE [DISTWIDTH] IN BLOOD: 13.5 % (ref 11.5–15.5)
GLUCOSE BLD-MCNC: 101 MG/DL (ref 75–99)
GLUCOSE BLD-MCNC: 126 MG/DL (ref 75–99)
GLUCOSE BLD-MCNC: 94 MG/DL (ref 75–99)
GLUCOSE SERPL-MCNC: 106 MG/DL (ref 74–99)
HCT VFR BLD AUTO: 29.4 % (ref 34–46)
HGB BLD-MCNC: 9.7 GM/DL (ref 11.4–16)
LYMPHOCYTES # SPEC AUTO: 0.2 K/UL (ref 1–4.8)
LYMPHOCYTES NFR SPEC AUTO: 2 %
MAGNESIUM SPEC-SCNC: 1.7 MG/DL (ref 1.6–2.3)
MCH RBC QN AUTO: 31.3 PG (ref 25–35)
MCHC RBC AUTO-ENTMCNC: 32.9 G/DL (ref 31–37)
MCV RBC AUTO: 95.1 FL (ref 80–100)
MONOCYTES # BLD AUTO: 0.4 K/UL (ref 0–1)
MONOCYTES NFR BLD AUTO: 4 %
NEUTROPHILS # BLD AUTO: 7.3 K/UL (ref 1.3–7.7)
NEUTROPHILS NFR BLD AUTO: 92 %
PLATELET # BLD AUTO: 350 K/UL (ref 150–450)
POTASSIUM SERPL-SCNC: 3.9 MMOL/L (ref 3.5–5.1)
PROT SERPL-MCNC: 5.9 G/DL (ref 6.3–8.2)
SODIUM SERPL-SCNC: 131 MMOL/L (ref 137–145)
WBC # BLD AUTO: 7.9 K/UL (ref 3.8–10.6)

## 2019-09-22 RX ADMIN — METHYLPREDNISOLONE SODIUM SUCCINATE SCH MG: 40 INJECTION, POWDER, FOR SOLUTION INTRAMUSCULAR; INTRAVENOUS at 22:20

## 2019-09-22 RX ADMIN — IPRATROPIUM BROMIDE AND ALBUTEROL SULFATE SCH ML: .5; 3 SOLUTION RESPIRATORY (INHALATION) at 19:43

## 2019-09-22 RX ADMIN — PIPERACILLIN AND TAZOBACTAM SCH: 3; .375 INJECTION, POWDER, FOR SOLUTION INTRAVENOUS at 17:11

## 2019-09-22 RX ADMIN — IPRATROPIUM BROMIDE AND ALBUTEROL SULFATE PRN ML: .5; 3 SOLUTION RESPIRATORY (INHALATION) at 23:35

## 2019-09-22 RX ADMIN — IPRATROPIUM BROMIDE AND ALBUTEROL SULFATE SCH: .5; 3 SOLUTION RESPIRATORY (INHALATION) at 16:29

## 2019-09-22 RX ADMIN — INSULIN ASPART SCH: 100 INJECTION, SOLUTION INTRAVENOUS; SUBCUTANEOUS at 06:52

## 2019-09-22 RX ADMIN — METHYLPREDNISOLONE SODIUM SUCCINATE SCH MG: 40 INJECTION, POWDER, FOR SOLUTION INTRAMUSCULAR; INTRAVENOUS at 17:04

## 2019-09-22 RX ADMIN — PIPERACILLIN AND TAZOBACTAM SCH MLS/HR: 3; .375 INJECTION, POWDER, FOR SOLUTION INTRAVENOUS at 08:15

## 2019-09-22 RX ADMIN — IPRATROPIUM BROMIDE AND ALBUTEROL SULFATE SCH ML: .5; 3 SOLUTION RESPIRATORY (INHALATION) at 07:50

## 2019-09-22 RX ADMIN — IPRATROPIUM BROMIDE AND ALBUTEROL SULFATE SCH ML: .5; 3 SOLUTION RESPIRATORY (INHALATION) at 16:33

## 2019-09-22 RX ADMIN — PANTOPRAZOLE SODIUM SCH MG: 40 INJECTION, POWDER, FOR SOLUTION INTRAVENOUS at 08:00

## 2019-09-22 RX ADMIN — CEFAZOLIN SCH MLS/HR: 330 INJECTION, POWDER, FOR SOLUTION INTRAMUSCULAR; INTRAVENOUS at 14:47

## 2019-09-22 RX ADMIN — HYDROMORPHONE HYDROCHLORIDE PRN MG: 1 INJECTION, SOLUTION INTRAMUSCULAR; INTRAVENOUS; SUBCUTANEOUS at 22:19

## 2019-09-22 RX ADMIN — HEPARIN SODIUM SCH UNIT: 5000 INJECTION, SOLUTION INTRAVENOUS; SUBCUTANEOUS at 17:04

## 2019-09-22 RX ADMIN — HEPARIN SODIUM SCH UNIT: 5000 INJECTION, SOLUTION INTRAVENOUS; SUBCUTANEOUS at 08:19

## 2019-09-22 RX ADMIN — LEUCINE, PHENYLALANINE, LYSINE, METHIONINE, ISOLEUCINE, VALINE, HISTIDINE, THREONINE, TRYPTOPHAN, ALANINE, GLYCINE, ARGININE, PROLINE, SERINE, TYROSINE, DEXTROSE SCH MLS/HR: 365; 280; 290; 200; 300; 290; 240; 210; 90; 1035; 515; 575; 340; 250; 20; 15 INJECTION INTRAVENOUS at 15:00

## 2019-09-22 RX ADMIN — INSULIN ASPART SCH: 100 INJECTION, SOLUTION INTRAVENOUS; SUBCUTANEOUS at 12:08

## 2019-09-22 RX ADMIN — IPRATROPIUM BROMIDE AND ALBUTEROL SULFATE SCH ML: .5; 3 SOLUTION RESPIRATORY (INHALATION) at 11:32

## 2019-09-22 RX ADMIN — LEVOFLOXACIN SCH MLS/HR: 5 INJECTION, SOLUTION INTRAVENOUS at 08:06

## 2019-09-22 RX ADMIN — INSULIN ASPART SCH: 100 INJECTION, SOLUTION INTRAVENOUS; SUBCUTANEOUS at 18:57

## 2019-09-22 RX ADMIN — METHYLPREDNISOLONE SODIUM SUCCINATE SCH MG: 40 INJECTION, POWDER, FOR SOLUTION INTRAMUSCULAR; INTRAVENOUS at 07:59

## 2019-09-22 RX ADMIN — HEPARIN SODIUM SCH UNIT: 5000 INJECTION, SOLUTION INTRAVENOUS; SUBCUTANEOUS at 22:20

## 2019-09-22 NOTE — P.PN
Subjective


Progress Note Date: 19


Principal diagnosis: 


Weakness and sleeping more than often than normal





Patient is a 53-year-old  female with a past medical history of 

laryngeal cancer status post chemo and radiation, and history of tobacco abuse 

who presented to the emergency department with weakness and difficulty staying 

awake.    Patient underwent treatment for laryngeal cancer in 2019.  S

shanta that point in time she been having difficulty swallowing in his been 

tolerating pudding.  She's become severely cachectic and weak.  In the ER she 

underwent an extensive evaluation.  She was found to have a sodium of 111, she 

underwent an acute abdominal series which was nonspecific.  She underwent a CT 

of the chest which showed emphysema with extensive pulmonary interstitial 

infiltrates as well as multiple enlarged lymph nodes.  In the emergency 

department she was having respiratory distress and appeared stridorous was 

subsequently admitted to the ICU.  Initially she was 90% on 4 L nasal cannula 

and was found to be significantly acidotic with a pH of 7.13.  She was 

subsequently intubated and placed on mechanical ventilation.  She was seen by 

critical care.  Due to her hypotension she required norepinephrine.  She was 

also seen by nephrology due to her low sodiums.  She did receive 3% normal 

saline due to her altered mentation.  She was started on empiric IV antibiotics 

for possible pneumonia of Levaquin and Zosyn.  She was transitioned from 3% 

saline to normal saline.  She was extubated on  without any difficulties.  

She then developed significant respiratory distress on the morning of  and 

required BiPAP, which was able to be weaned by the morning of . She was 

started on TPN on  due to no ability to feed and severe malnutrition.  

Patient amenable for -Leandro, attempted placement on  unable to do so due 

to scar tissue and inflammation. Would be able to have open G tube, failed MBSS,

plan for open G tube . 





Patient seen and examined at bedside.  Doing well today, pain control, anxiety 

much improved, no nausea or vomiting. 





Objective





- Vital Signs


Vital signs: 


                                   Vital Signs











Temp  98.1 F   19 08:00


 


Pulse  101 H  19 08:07


 


Resp  22   19 08:00


 


BP  112/80   19 08:00


 


Pulse Ox  92 L  19 08:00








                                 Intake & Output











 1919





 18:59 06:59 18:59


 


Intake Total 2101 880 260


 


Output Total 1375 1550 600


 


Balance 726 -670 -340


 


Weight  33 kg 


 


Intake:   


 


   880 260


 


    .9 kvo 200 240 


 


    Mvi, Adult No.4 with Vit 45 540 180





    K 10 ml Trace (Conc-1Ml/   





    Dose) 1 ml Parenteral   





    Electrolytes 20 ml   





    Potassium Chloride 40 meq   





    In Amino Acid 5%-D15w 1,   





    000 ml @ 45 mls/hr IV .   





    [id number] [facility] Rx#:[id number]   


 


    Piperacillin-Tazobactam 3 100 100 





    .375 gm In Sodium   





    Chloride 0.9% 100 ml @ 25   





    mls/hr IVPB Q8HR [facility] Rx#   





    :[id number]   


 


    Sodium Chloride 0.9% 1,   80





    000 ml @ 20 mls/hr IV .   





    Q24H [facility] Rx#:[id number]   


 


  Intake, IV Titration 1351  





  Amount   


 


    Levofloxacin 500Mg-D5w 100  





    Pmx 500 mg In Dextrose/   





    Water 1 100ml.bag @ 100   





    mls/hr IVPB Q24H [facility] Rx#:   





    [id number]   


 


    Magnesium Sulfate-D5w Pmx 200  





    1 gm In Dextrose/Water 1   





    100ml.bag @ 100 mls/hr   





    IVPB Q1H [facility] Rx#:   





    [id number]   


 


    Mvi, Adult No.4 with Vit 1051  





    K 10 ml Trace (Conc-1Ml/   





    Dose) 1 ml Parenteral   





    Electrolytes 20 ml   





    Potassium Chloride 40 meq   





    In Amino Acid 5%-D15w 1,   





    000 ml @ 45 mls/hr IV .   





    [id number] [facility] Rx#:[id number]   


 


  TPN/  


 


    .9 kvo 405  


 


Output:   


 


  Urine 1375 1550 600


 


Other:   


 


  Voiding Method Indwelling Catheter Indwelling Catheter 








                       ABP, PAP, CO, CI - Last Documented











Arterial Blood Pressure        75/63

















- Exam


General: non toxic, cachectic, appears older than stated age, temporal wasting


Derm: multiple areas of echymosis, warm, dry


Head: atraumatic, normocephalic, symmetric


Eyes: EOMI, no lid lag, anicteric sclera


Mouth: no lip lesion, mucus membranes dry


Cardiovascular: S1S2 reg, no murmur, positive posterior tibial pulse bilateral, 


Lungs: Decreased breath sounds bilateral, no accessory muscle use


Abdominal: soft,  nontender to palpation, no guarding, no appreciable organom

egaly


Ext: + gross muscle atrophy, trace edema, no contractures


Neuro:  CN II-XI grossly intact, no focal neuro deficits


Psych: Alert, oriented, flat affect 








- Labs


CBC & Chem 7: 


                                 19 05:10





                                 19 17:25


Labs: 


                  Abnormal Lab Results - Last 24 Hours (Table)











  19 Range/Units





  11:32 16:56 06:05 


 


POC Glucose (mg/dL)  134 H  104 H  101 H  (75-99)  mg/dL














Assessment and Plan


Assessment: 


Severe protein calorie malnutrition with cachexia and BMI of 11.4 Associated 

with dysphagia 


-Attempted PEG tube on.  Unsuccessful secondary to severe inflammation and 

scar tissue.  Plan for open gastrostomy tube on 


- continue TPN 


- Dietary recommendations


- failed MBS


- Open G tube 





Hypovolemic hyponatremia with resultant metabolic encephalopathy, improving 


-IV fluids completed, on TPN


-Nephrology recommendations


-Status post 3% normal saline


- follow sodium level 





Acute hypoxic respiratory failure secondary to Pneumonia, possible gram 

negative, likely aspiration


-Continue with Zosyn, Levaquin D # 9


-Pulmonary hygiene


-Follow chest x-ray until clear


-Pulmonary recommendations appreciated





Pulmonary lymphadenopathy, likely secondary to known prior laryngeal cancer


-Will need urgent follow-up with her oncologist at [facility]. Patient aware.





Hypokalemia, improved


-follow levels





Anemia, likely dilutional


-Stable


-Outpatient follow-up


-Follow intermittent CBC





Contraction alkalosis, resolved


Hypomagnesemia, resolved





DVT prophylaxis: SCDs


Discussed with: Patient, nursing


Anticipated discharge: 3-4 days


Anticipated discharge place: home vs SNF


A total of 25 minutes was spent on the care of this complex patient more than 

50% of the time was spent in counseling and care coordination.

## 2019-09-22 NOTE — PN
PROGRESS NOTE



DATE OF SERVICE:

September 22, 2019



This is a 54-year-old female who was admitted on September 12.  She was initially

admitted with a diagnosis of mental status changes and encephalopathy.  She also had

profound hyponatremia with a presenting sodium of 111.  She developed acute hypoxemic

respiratory failure secondary to aspiration pneumonia.  The patient was intubated on

September 13 and extubated the next day on the 14th.  She has had significant

improvement over the last 4 or 5 days.  She has been weaned down to room air.  She is

getting a saline IV at 20 mL an hour, TPN at 45 mL an hour.  One of the issues remains

poor nutrition.  Dr. Wright tried to do an EGD for PEG tube placement was not able to

do that because of scarring of the upper esophagus.  This is from the radiation she

received because of her laryngeal carcinoma.  Anyway, she is getting nutrition with

TPN.  The plan tomorrow is a surgical feeding tube.  She otherwise is doing well.

Denies any particular complaints.  Denies any shortness of breath.  She is n.p.o.



PHYSICAL EXAMINATION:

VITAL SIGNS: Her current vital signs are reviewed.  Temperature is 98.4.  Heart rate

89, respiratory rate 24, blood pressure 133/101, mean 111, room air saturation 95%.

GENERAL: Appears in no acute distress HEENT examination is grossly unremarkable.

Mucous membranes are moist.  No oral lesions.

NECK is supple.  Full range of motion.  No adenopathy.

CARDIOVASCULAR examination reveals regular rhythm rate.  Heart rate about 80 beats per

minute.  S1, S2 normal.

LUNGS:  Reveal a few scattered rhonchi.  No wheezes or crackles.  Breath sounds equal.

Breath sounds have improved.

ABDOMEN is soft.  No bowel sounds.

EXTREMITIES are intact.  No cyanosis, clubbing, or edema.

SKIN: Without rash.

NEUROLOGIC: Examination is nonfocal.



Microbiologic studies on this patient have been negative all along.



LABS:

Reviewed.  No new labs from today.



Yesterday's labs were reviewed.  No chest x-ray from today.  Chest x-ray from yesterday

continued to show bibasilar infiltrates, atelectasis and/or effusions.  All in all

though, her chest x-ray is dramatically improved.



Medications are reviewed.  She remains on all appropriate medications including Zosyn

and Levaquin.  She is on breathing treatments as well.



ASSESSMENT:

1. Acute metabolic encephalopathy in large part related to the patient's profound

    hypovolemic, hyponatremia, which is significantly improved.  Admission sodium was

    111.

2. Acute hypoxemic and hypercapnic respiratory failure secondary to aspiration

    pneumonia, much improved, status post brief period of intubation on September 13

    and extubation on the following day the 14th.

3. Ongoing hypoxemic respiratory failure, dramatically improved.  The patient has been

    weaned off oxygen.

4. Severe hypovolemic hyponatremia, resolved.

5. History of laryngeal carcinoma, status post chemo radiation, with last treatments

    in January 2019.

6. Severe protein calorie malnutrition with inanition.

7. Anorexia/cachexia syndrome.

8. History of previous tobacco dependence.

9. History of anemia of chronic disease.

10.Severe electrolyte disturbance, resolved.

11.Hypoalbuminemia from poor nutrition.

12.Contraction alkalosis, improved.

13.Significantly poor dentition with dental caries.



PLAN:

The patient is currently n.p.o.  The patient's O2 has been weaned off.  She remains on

TPN at 45 mL an hour.  She will have a surgical feeding tube placed tomorrow.  I

believe by Dr. Wright.  No additional recommendations are made.  Head of bed elevated

at all times.  Aspiration precautions.





MMODL / IJN: [id number] / Job#: [id number]

## 2019-09-22 NOTE — P.PN
Subjective


Progress Note Date: 09/22/19


Principal diagnosis: 





Malnutrition





Patient has no new complaints.  Scheduled for gastrostomy tube placement 

tomorrow.  Denies pain.





Objective





- Vital Signs


Vital signs: 


                                   Vital Signs











Temp  98.4 F   09/22/19 04:00


 


Pulse  101 H  09/22/19 08:07


 


Resp  28 H  09/22/19 04:00


 


BP  133/101   09/22/19 04:00


 


Pulse Ox  95   09/22/19 04:00








                                 Intake & Output











 09/21/19 09/22/19 09/22/19





 18:59 06:59 18:59


 


Intake Total 2101 880 


 


Output Total 1375 1550 


 


Balance 726 -670 


 


Weight  33 kg 


 


Intake:   


 


   880 


 


    .9 kvo 200 240 


 


    Mvi, Adult No.4 with Vit 45 540 





    K 10 ml Trace (Conc-1Ml/   





    Dose) 1 ml Parenteral   





    Electrolytes 20 ml   





    Potassium Chloride 40 meq   





    In Amino Acid 5%-D15w 1,   





    000 ml @ 45 mls/hr IV .   





    [id number] [facility] Rx#:[id number]   


 


    Piperacillin-Tazobactam 3 100 100 





    .375 gm In Sodium   





    Chloride 0.9% 100 ml @ 25   





    mls/hr IVPB Q8HR [facility] Rx#   





    :[id number]   


 


  Intake, IV Titration 1351  





  Amount   


 


    Levofloxacin 500Mg-D5w 100  





    Pmx 500 mg In Dextrose/   





    Water 1 100ml.bag @ 100   





    mls/hr IVPB Q24H [facility] Rx#:   





    [id number]   


 


    Magnesium Sulfate-D5w Pmx 200  





    1 gm In Dextrose/Water 1   





    100ml.bag @ 100 mls/hr   





    IVPB Q1H [facility] Rx#:   





    [id number]   


 


    Mvi, Adult No.4 with Vit 1051  





    K 10 ml Trace (Conc-1Ml/   





    Dose) 1 ml Parenteral   





    Electrolytes 20 ml   





    Potassium Chloride 40 meq   





    In Amino Acid 5%-D15w 1,   





    000 ml @ 45 mls/hr IV .   





    [id number] [facility] Rx#:[id number]   


 


  TPN/  


 


    .9 kvo 405  


 


Output:   


 


  Urine 1375 1550 


 


Other:   


 


  Voiding Method Indwelling Catheter Indwelling Catheter 








                       ABP, PAP, CO, CI - Last Documented











Arterial Blood Pressure        75/63

















- Exam





Abdomen: Soft, nontender, nondistended





- Labs


CBC & Chem 7: 


                                 09/21/19 05:10





                                 09/21/19 17:25


Labs: 


                  Abnormal Lab Results - Last 24 Hours (Table)











  09/21/19 09/21/19 09/22/19 Range/Units





  11:32 16:56 06:05 


 


POC Glucose (mg/dL)  134 H  104 H  101 H  (75-99)  mg/dL














Assessment and Plan


(1) Severe protein-energy malnutrition


Narrative/Plan: 


Arrangements for open gastrostomy tube have been made for tomorrow.  Nothing by 

mouth after midnight.


Current Visit: Yes   Status: Acute   Code(s): E43 - UNSPECIFIED SEVERE PROTEIN-

CALORIE MALNUTRITION   SNOMED Code(s): 048588254

## 2019-09-23 LAB
ALBUMIN SERPL-MCNC: 2.9 G/DL (ref 3.5–5)
ALP SERPL-CCNC: 81 U/L (ref 38–126)
ALT SERPL-CCNC: 43 U/L (ref 9–52)
ANION GAP SERPL CALC-SCNC: 6 MMOL/L
AST SERPL-CCNC: 20 U/L (ref 14–36)
BUN SERPL-SCNC: 15 MG/DL (ref 7–17)
CALCIUM SPEC-MCNC: 8.8 MG/DL (ref 8.4–10.2)
CHLORIDE SERPL-SCNC: 100 MMOL/L (ref 98–107)
CO2 SERPL-SCNC: 28 MMOL/L (ref 22–30)
ERYTHROCYTE [DISTWIDTH] IN BLOOD BY AUTOMATED COUNT: 2.86 M/UL (ref 3.8–5.4)
ERYTHROCYTE [DISTWIDTH] IN BLOOD: 14.7 % (ref 11.5–15.5)
GLUCOSE BLD-MCNC: 104 MG/DL (ref 75–99)
GLUCOSE BLD-MCNC: 124 MG/DL (ref 75–99)
GLUCOSE BLD-MCNC: 149 MG/DL (ref 75–99)
GLUCOSE BLD-MCNC: 82 MG/DL (ref 75–99)
GLUCOSE SERPL-MCNC: 121 MG/DL (ref 74–99)
HCT VFR BLD AUTO: 27.1 % (ref 34–46)
HGB BLD-MCNC: 9.2 GM/DL (ref 11.4–16)
MAGNESIUM SPEC-SCNC: 1.9 MG/DL (ref 1.6–2.3)
MCH RBC QN AUTO: 32.4 PG (ref 25–35)
MCHC RBC AUTO-ENTMCNC: 34.1 G/DL (ref 31–37)
MCV RBC AUTO: 94.9 FL (ref 80–100)
PLATELET # BLD AUTO: 386 K/UL (ref 150–450)
POTASSIUM SERPL-SCNC: 4.1 MMOL/L (ref 3.5–5.1)
PROT SERPL-MCNC: 5.8 G/DL (ref 6.3–8.2)
SODIUM SERPL-SCNC: 134 MMOL/L (ref 137–145)
WBC # BLD AUTO: 6 K/UL (ref 3.8–10.6)

## 2019-09-23 PROCEDURE — 0DH60UZ INSERTION OF FEEDING DEVICE INTO STOMACH, OPEN APPROACH: ICD-10-PCS

## 2019-09-23 RX ADMIN — LEUCINE, PHENYLALANINE, LYSINE, METHIONINE, ISOLEUCINE, VALINE, HISTIDINE, THREONINE, TRYPTOPHAN, ALANINE, GLYCINE, ARGININE, PROLINE, SERINE, TYROSINE, DEXTROSE SCH MLS/HR: 365; 280; 290; 200; 300; 290; 240; 210; 90; 1035; 515; 575; 340; 250; 20; 15 INJECTION INTRAVENOUS at 18:00

## 2019-09-23 RX ADMIN — PANTOPRAZOLE SODIUM SCH MG: 40 INJECTION, POWDER, FOR SOLUTION INTRAVENOUS at 08:26

## 2019-09-23 RX ADMIN — IPRATROPIUM BROMIDE AND ALBUTEROL SULFATE SCH ML: .5; 3 SOLUTION RESPIRATORY (INHALATION) at 20:21

## 2019-09-23 RX ADMIN — INSULIN ASPART SCH: 100 INJECTION, SOLUTION INTRAVENOUS; SUBCUTANEOUS at 01:31

## 2019-09-23 RX ADMIN — HEPARIN SODIUM SCH UNIT: 5000 INJECTION, SOLUTION INTRAVENOUS; SUBCUTANEOUS at 08:26

## 2019-09-23 RX ADMIN — IPRATROPIUM BROMIDE AND ALBUTEROL SULFATE SCH ML: .5; 3 SOLUTION RESPIRATORY (INHALATION) at 10:42

## 2019-09-23 RX ADMIN — IPRATROPIUM BROMIDE AND ALBUTEROL SULFATE SCH ML: .5; 3 SOLUTION RESPIRATORY (INHALATION) at 07:17

## 2019-09-23 RX ADMIN — INSULIN ASPART SCH: 100 INJECTION, SOLUTION INTRAVENOUS; SUBCUTANEOUS at 12:52

## 2019-09-23 RX ADMIN — INSULIN ASPART SCH: 100 INJECTION, SOLUTION INTRAVENOUS; SUBCUTANEOUS at 06:59

## 2019-09-23 RX ADMIN — SOYBEAN OIL SCH MLS/HR: 20 INJECTION, SOLUTION INTRAVENOUS at 18:02

## 2019-09-23 RX ADMIN — INSULIN ASPART SCH: 100 INJECTION, SOLUTION INTRAVENOUS; SUBCUTANEOUS at 18:21

## 2019-09-23 RX ADMIN — LEVOFLOXACIN SCH MLS/HR: 5 INJECTION, SOLUTION INTRAVENOUS at 08:27

## 2019-09-23 RX ADMIN — HEPARIN SODIUM SCH UNIT: 5000 INJECTION, SOLUTION INTRAVENOUS; SUBCUTANEOUS at 18:00

## 2019-09-23 RX ADMIN — METHYLPREDNISOLONE SODIUM SUCCINATE SCH MG: 40 INJECTION, POWDER, FOR SOLUTION INTRAMUSCULAR; INTRAVENOUS at 08:26

## 2019-09-23 RX ADMIN — HYDROMORPHONE HYDROCHLORIDE PRN MG: 1 INJECTION, SOLUTION INTRAMUSCULAR; INTRAVENOUS; SUBCUTANEOUS at 18:00

## 2019-09-23 RX ADMIN — CEFAZOLIN SCH: 330 INJECTION, POWDER, FOR SOLUTION INTRAMUSCULAR; INTRAVENOUS at 16:46

## 2019-09-23 RX ADMIN — HYDROMORPHONE HYDROCHLORIDE PRN MG: 1 INJECTION, SOLUTION INTRAMUSCULAR; INTRAVENOUS; SUBCUTANEOUS at 21:19

## 2019-09-23 RX ADMIN — IPRATROPIUM BROMIDE AND ALBUTEROL SULFATE SCH: .5; 3 SOLUTION RESPIRATORY (INHALATION) at 15:58

## 2019-09-23 NOTE — P.PN
Subjective


Progress Note Date: 09/23/19


Principal diagnosis: 


 Shortness of breath





 On 09/23/2019 patient was seen in follow-up in the intensive care unit, she has

been an overflow for medical surgical floor, signs are stable, room air pulse ox

is 95%, patient is afebrile, lung sounds are essentially clear to auscultation, 

no rhonchi, no wheezing, no completed shortness of breath, no chest pain, no new

chest x-rays, her blood and sputum cultures have shown no growth.  Patient has 

been treated for suspected aspiration pneumonia, with a combination of Zosyn and

Levaquin, she has completed a course of Zosyn, and remains on Levaquin only at 

this time.  Remains on IV steroids, but patient has really not had any wheezing 

or congestion.  Last chest x-ray was on 09/21/2019 and showed bilateral pleural 

effusions, and bibasilar airspace disease.  Patient has been nothing by mouth in

view of aspiration, she had a modified barium swallow evaluation on 09/20/2019 

which showed aspiration with thin liquids, nectar thick liquids and pudding 

thick consistencies, patient was unable to have a PEG tube inserted related to e

xtensive scarring in the esophagus related to previous radiation treatments, and

patient is going for a tube gastrostomy insertion by Dr. Wright today.  His 

labs have been reviewed, showing white blood cell, 6.0, hemoglobin of 9.2, serum

sodium is 134, the rest of electrolytes and renal profile were unremarkable.  

Patient remains on TPN








Objective





- Vital Signs


Vital signs: 


                                   Vital Signs











Temp  97 F L  09/23/19 15:49


 


Pulse  87   09/23/19 16:04


 


Resp  18   09/23/19 16:04


 


BP  137/84   09/23/19 16:04


 


Pulse Ox  93 L  09/23/19 16:04








                                 Intake & Output











 09/22/19 09/23/19 09/23/19





 18:59 06:59 18:59


 


Intake Total 1571 260 350


 


Output Total 925 325 155


 


Balance 646 -65 195


 


Weight   33 kg


 


Intake:   


 


   260 350


 


    Mvi, Adult No.4 with Vit 360 180 





    K 10 ml Trace (Conc-1Ml/   





    Dose) 1 ml Parenteral   





    Electrolytes 20 ml   





    Potassium Chloride 40 meq   





    In Amino Acid 5%-D15w 1,   





    000 ml @ 45 mls/hr IV .   





    [id number] Rx[facility][id number]   


 


    Sodium Chloride 0.9% 1, 160 80 





    000 ml @ 20 mls/hr IV .   





    Q24H [facility] Rx#:[id number]   


 


  Intake, IV Titration 1051  





  Amount   


 


    Mvi, Adult No.4 with Vit 1051  





    K 10 ml Trace (Conc-1Ml/   





    Dose) 1 ml Parenteral   





    Electrolytes 20 ml   





    Potassium Chloride 40 meq   





    In Amino Acid 5%-D15w 1,   





    000 ml @ 45 mls/hr IV .   





    [id number] [facility] Rx#:[id number]   


 


Output:   


 


  Urine 925 325 150


 


  Estimated Blood Loss   5


 


Other:   


 


  Voiding Method Indwelling Catheter Indwelling Catheter Indwelling Catheter








                       ABP, PAP, CO, CI - Last Documented











Arterial Blood Pressure        75/63

















- Exam


 GENERAL EXAM: Alert, very pleasant, cachectic 54-year-old white female, on room

air, comfortable in no apparent distress.


HEAD: Normocephalic/atraumatic.


EYES: Normal reaction of pupils, equal size.  Conjunctiva pink, sclera white.


NOSE: Clear with pink turbinates.


THROAT: No erythema or exudates.


NECK: No masses, no JVD, no thyroid enlargement, no adenopathy.


CHEST: No chest wall deformity.  Symmetrical expansion. 


LUNGS: Equal air entry with no crackles, wheeze, rhonchi or dullness.


CVS: Regular rate and rhythm, normal S1 and S2, no gallops, no murmurs, no rubs


ABDOMEN: Soft, nontender.  No hepatosplenomegaly, normal bowel sounds, no 

guarding or rigidity.


EXTREMITIES: No clubbing, no edema, no cyanosis, 2+ pulses and upper and lower 

extremities.


MUSCULOSKELETAL: Muscle strength and tone normal.


SPINE: No scoliosis or deformity


SKIN: No rashes


CENTRAL NERVOUS SYSTEM: Alert and oriented -3.  No focal deficits, tone is 

normal in all 4 extremities.


PSYCHIATRIC: Alert and oriented -3.  Appropriate affect.  Intact judgment and 

insight.











- Labs


CBC & Chem 7: 


                                 09/23/19 04:34





                                 09/23/19 04:33


Labs: 


                  Abnormal Lab Results - Last 24 Hours (Table)











  09/22/19 09/23/19 09/23/19 Range/Units





  18:21 00:03 04:33 


 


RBC     (3.80-5.40)  m/uL


 


Hgb     (11.4-16.0)  gm/dL


 


Hct     (34.0-46.0)  %


 


Sodium    134 L  (137-145)  mmol/L


 


Creatinine    0.28 L  (0.52-1.04)  mg/dL


 


Glucose    121 H  (74-99)  mg/dL


 


POC Glucose (mg/dL)  126 H  149 H   (75-99)  mg/dL


 


Total Protein    5.8 L  (6.3-8.2)  g/dL


 


Albumin    2.9 L  (3.5-5.0)  g/dL














  09/23/19 09/23/19 09/23/19 Range/Units





  04:34 06:08 11:48 


 


RBC  2.86 L    (3.80-5.40)  m/uL


 


Hgb  9.2 L    (11.4-16.0)  gm/dL


 


Hct  27.1 L    (34.0-46.0)  %


 


Sodium     (137-145)  mmol/L


 


Creatinine     (0.52-1.04)  mg/dL


 


Glucose     (74-99)  mg/dL


 


POC Glucose (mg/dL)   104 H  124 H  (75-99)  mg/dL


 


Total Protein     (6.3-8.2)  g/dL


 


Albumin     (3.5-5.0)  g/dL














Assessment and Plan


Plan: 


 Assessment:





#1.  Acute hypoxemic and hypercapnic respiratory failure secondary to aspiration

pneumonia, much improved, patient required a brief period of intubation and 

placement on mechanical ventilation on September 13 and extubation on September 14





#2.  Pulmonary lymphadenopathy, possibly related to prior history of laryngeal 

cancer





#3.  Acute metabolic encephalopathy in large part related to the patient's 

profound hypovolemic hyponatremia, which significantly improved





#4.  Severe hypovolemic hyponatremia, resolved





#5.  History of laryngeal carcinoma, status post chemo and radiation therapy, wi

th last treatments in January 2019, she was treated at [facility]





#6.  Severe protein calorie malnutrition





#7.  Anorexia/cachexia syndrome





#8.  History of previous tobacco dependence





#9.  History of anemia of chronic disease





#10.  Severe electrolyte disturbance, resolved





#11.  Hypoalbuminemia





#12.  Significantly poor dentition with dental caries





Plan:





Continue with Levaquin, continue breathing treatments, we'll discontinue the IV 

Solu-Medrol, no wheezing, no congestion noted on today's exam, patient is on 

room air, remains nothing by mouth, she is going for gastric tube insertion 

today, remains on TPN for nutritional support.  We need further records from the

[facility] in regards to patient's follow-up regarding laryngeal 

cancer, and up pulmonary lymphadenopathy seen on CT chest may be related to 

metastatic disease.  Otherwise patient is stable to transfer out of the 

intensive care unit today to general medical floor.  We'll continue to follow





I performed a history & physical examination of the patient and discussed their 

management with my nurse practitioner, Romina Brown.  I reviewed the nurse 

practitioner's note and agree with the documented findings and plan of care.  

Lung sounds are positive for clear breath sounds.  The findings and the impres

mercedes was discussed with the patient.  I attest to the documentation by the nurse

practitioner. 








Time with Patient: Less than 30

## 2019-09-23 NOTE — P.PN
Subjective


Progress Note Date: 19


Principal diagnosis: 





Patient is a 53-year-old  female with a past medical history of 

laryngeal cancer status post chemo and radiation, and history of tobacco abuse 

who presented to the emergency department with weakness and difficulty staying 

awake.    Patient underwent treatment for laryngeal cancer in 2019.  

Since that point in time she been having difficulty swallowing in his been 

tolerating pudding.  She's become severely cachectic and weak.  In the ER she 

underwent an extensive evaluation.  She was found to have a sodium of 111, she 

underwent an acute abdominal series which was nonspecific.  She underwent a CT 

of the chest which showed emphysema with extensive pulmonary interstitial 

infiltrates as well as multiple enlarged lymph nodes.  In the emergency 

department she was having respiratory distress and appeared stridorous was 

subsequently admitted to the ICU.  Initially she was 90% on 4 L nasal cannula 

and was found to be significantly acidotic with a pH of 7.13.  She was 

subsequently intubated and placed on mechanical ventilation.  She was seen by 

critical care.  Due to her hypotension she required norepinephrine.  She was 

also seen by nephrology due to her low sodiums.  She did receive 3% normal 

saline due to her altered mentation.  She was started on empiric IV antibiotics 

for possible pneumonia of Levaquin and Zosyn.  She was transitioned from 3% 

saline to normal saline.  She was extubated on  without any difficulties.  

She then developed significant respiratory distress on the morning of  and 

required BiPAP, which was able to be weaned by the morning of . She was 

started on TPN on  due to no ability to feed and severe malnutrition.  

Patient amenable for -Leandro, attempted placement on  unable to do so due 

to scar tissue and inflammation. Would be able to have open G tube, failed MBSS,

plan for open G tube . 





Patient seen and examined in follow-up today, patient doing well without any 

significant complaints or concerns





Objective





- Vital Signs


Vital signs: 


                                   Vital Signs











Temp  98 F   19 00:00


 


Pulse  106 H  19 00:00


 


Resp  22   19 00:00


 


BP  98/67   19 00:00


 


Pulse Ox  97   19 00:00








                                 Intake & Output











 19





 18:59 06:59 18:59


 


Intake Total 1571 260 


 


Output Total 925 325 


 


Balance 646 -65 


 


Intake:   


 


   260 


 


    Mvi, Adult No.4 with Vit 360 180 





    K 10 ml Trace (Conc-1Ml/   





    Dose) 1 ml Parenteral   





    Electrolytes 20 ml   





    Potassium Chloride 40 meq   





    In Amino Acid 5%-D15w 1,   





    000 ml @ 45 mls/hr IV .   





    [id number] [facility] Rx#:[id number]   


 


    Sodium Chloride 0.9% 1, 160 80 





    000 ml @ 20 mls/hr IV .   





    Q24H [facility] Rx#:[id number]   


 


  Intake, IV Titration 1051  





  Amount   


 


    Mvi, Adult No.4 with Vit 1051  





    K 10 ml Trace (Conc-1Ml/   





    Dose) 1 ml Parenteral   





    Electrolytes 20 ml   





    Potassium Chloride 40 meq   





    In Amino Acid 5%-D15w 1,   





    000 ml @ 45 mls/hr IV .   





    [id number] [facility] Rx#:[id number]   


 


Output:   


 


  Urine 925 325 


 


Other:   


 


  Voiding Method Indwelling Catheter Indwelling Catheter 








                       ABP, PAP, CO, CI - Last Documented











Arterial Blood Pressure        75/63

















- Exam





Constitutional: No acute distress, awake and alert cachectic appearance





Eyes: Anicteric sclerae, moist conjunctiva, no lid-lag, PERRLA





ENMT: Bilateral temporal wasting 





 NC/AT,Oropharynx clear, no erythema, exudates





Neck:Supple, FROM, no masses, or JVD, No carotid bruits; No thyromegaly





Lungs: Decreased breath sounds bilaterally, unlabored





Cardiovascular: Heart regular in rate and rhythm,  No murmurs, gallops, or rubs 

no peripheral edema





Abdominal: Soft Nontender, nom distended, no guarding, no rebound or  rigidity, 

Normoactive bowel sounds No hepatomegaly, No splenomegaly,  No palpable mass No 

abdominal wall hernia noted 





Skin: Normal temperature, tone, texture, turgor, No induration No subcutaneous 

nodules, No rash, lesions, No ulcers





Extremities:No digital cyanosis No clubbing, Pedal pulses intact and  

symmetrical Radial pulses intact and symmetrical Normal gait and station, No 

calf tenderness   


      


Neuro: Awake alert following commands no focal deficits appreciated








- Labs


CBC & Chem 7: 


                                 19 04:34





                                 19 04:33


Labs: 


                  Abnormal Lab Results - Last 24 Hours (Table)











  19 Range/Units





  13:48 13:48 18:21 


 


RBC  3.09 L    (3.80-5.40)  m/uL


 


Hgb  9.7 L    (11.4-16.0)  gm/dL


 


Hct  29.4 L    (34.0-46.0)  %


 


Lymphocytes #  0.2 L    (1.0-4.8)  k/uL


 


Sodium   131 L   (137-145)  mmol/L


 


Creatinine   0.24 L   (0.52-1.04)  mg/dL


 


Glucose   106 H   (74-99)  mg/dL


 


POC Glucose (mg/dL)    126 H  (75-99)  mg/dL


 


Total Protein   5.9 L   (6.3-8.2)  g/dL


 


Albumin   2.9 L   (3.5-5.0)  g/dL














  19 Range/Units





  00:03 04:33 04:34 


 


RBC    2.86 L  (3.80-5.40)  m/uL


 


Hgb    9.2 L  (11.4-16.0)  gm/dL


 


Hct    27.1 L  (34.0-46.0)  %


 


Lymphocytes #     (1.0-4.8)  k/uL


 


Sodium   134 L   (137-145)  mmol/L


 


Creatinine   0.28 L   (0.52-1.04)  mg/dL


 


Glucose   121 H   (74-99)  mg/dL


 


POC Glucose (mg/dL)  149 H    (75-99)  mg/dL


 


Total Protein   5.8 L   (6.3-8.2)  g/dL


 


Albumin   2.9 L   (3.5-5.0)  g/dL














  19 Range/Units





  06:08 


 


RBC   (3.80-5.40)  m/uL


 


Hgb   (11.4-16.0)  gm/dL


 


Hct   (34.0-46.0)  %


 


Lymphocytes #   (1.0-4.8)  k/uL


 


Sodium   (137-145)  mmol/L


 


Creatinine   (0.52-1.04)  mg/dL


 


Glucose   (74-99)  mg/dL


 


POC Glucose (mg/dL)  104 H  (75-99)  mg/dL


 


Total Protein   (6.3-8.2)  g/dL


 


Albumin   (3.5-5.0)  g/dL














Assessment and Plan


(1) Severe protein-energy malnutrition


Narrative/Plan: 


Severe protein calorie malnutrition with cachexia and BMI of 11.4 Associated 

with dysphagia 


-Attempted PEG tube on.  Unsuccessful secondary to severe inflammation and 

scar tissue.  Plan for open gastrostomy tube on 


- continue TPN 


- Dietary recommendations


- failed MBS


- Open G tube 


Current Visit: Yes   Status: Acute   Code(s): E43 - UNSPECIFIED SEVERE PROTEIN-

CALORIE MALNUTRITION   SNOMED Code(s): 398530360


   





(2) Acute respiratory failure with hypoxia and hypercapnia


Narrative/Plan: 


Acute hypoxic respiratory failure secondary to Pneumonia, possible gram 

negative, likely aspiration


-Continue with Zosyn, Levaquin D # 10


-Pulmonary hygiene


-Follow chest x-ray until clear


-Pulmonary recommendations appreciated


Current Visit: Yes   Status: Acute   Code(s): J96.01 - ACUTE RESPIRATORY FAILURE

WITH HYPOXIA; J96.02 - ACUTE RESPIRATORY FAILURE WITH HYPERCAPNIA   SNOMED 

Code(s): 544938235


   





(3) Hyponatremia


Narrative/Plan: 


-IV fluids completed, on TPN


-Nephrology recommendations


-Status post 3% normal saline


- follow sodium level 


Current Visit: Yes   Status: Acute   Code(s): E87.1 - HYPO-OSMOLALITY AND 

HYPONATREMIA   SNOMED Code(s): 73900775


   





(4) Mediastinal lymphadenopathy


Narrative/Plan: 





* CT of the chest showing extensive bronchial and subcarinal adenopathy


* Suspected to be metastatic versus lung primary


* Will need urgent follow-up with her oncologist at [facility]. Patient aware.


Current Visit: Yes   Status: Acute   Code(s): R59.0 - LOCALIZED ENLARGED LYMPH 

NODES   SNOMED Code(s): 07168976


   





(5) History of laryngeal cancer


Narrative/Plan: 





* Most recent treatment in 2019


Current Visit: Yes   Status: Chronic   Code(s): Z85.21 - PERSONAL HISTORY OF 

MALIGNANT NEOPLASM OF LARYNX   SNOMED Code(s): 333811888


   





(6) Weakness


Narrative/Plan: 





* Secondary to profound hyponatremia


Current Visit: Yes   Status: Acute   Code(s): R53.1 - WEAKNESS   SNOMED Code(s):

65562990


   





(7) Metabolic encephalopathy


Narrative/Plan: 





* Secondary to hyponatremia


Current Visit: Yes   Status: Resolved   Code(s): G93.41 - METABOLIC 

ENCEPHALOPATHY   SNOMED Code(s): 95364992


   





(8) Hypokalemia


Narrative/Plan: 





* Continue to monitor daily continued on potassium replacement protocol


* We'll replace and recheck


Current Visit: Yes   Status: Acute   Code(s): E87.6 - HYPOKALEMIA   SNOMED 

Code(s): 93808002


   


Plan: 





Disposition


* Patient medically stable for transfer to the medical floor after having open 

  G-tube placement


* Anticipate discharge tomorrow

## 2019-09-23 NOTE — P.OP
Date of Procedure: 09/23/19


Preoperative Diagnosis: 


Malnutrition


Postoperative Diagnosis: 


Malnutrition


Procedure(s) Performed: 


Tube gastrostomy


Anesthesia: KATHRYN


Surgeon: Deep Wright


Pathology: none sent


Condition: stable


Disposition: PACU


Description of Procedure: 


The patient's placed on the operative table in the supine position.  She 

received general anesthesia.  Her abdomen was prepped and draped usual sterile 

fashion.  The skin was incised in the midline.  The fascia was cut with 

electrocautery.  The abdomen was entered.  The stomach was visualized.  3-0 silk

sutures used to place a pursestring on the stomach.  The gastrotomy was 

performed using large cautery.  A 16-Turk[place] DOE feeding tube was placed through 

the anterior abdominal wall and then into the stomach.  The balloon was 

insufflated.  The pursestring suture was secured.  The stomach was then tacked 

in 4 quadrants using 3-0 silk suture.  The bolster was secured at the 2 cm gilson.

 The fascia was closed with #1 strata fix suture.  Skin was closed staples.  

Patient tolerated procedure well was sent to recovery room in stable condition.

## 2019-09-24 LAB
ALBUMIN SERPL-MCNC: 3.4 G/DL (ref 3.5–5)
ALP SERPL-CCNC: 92 U/L (ref 38–126)
ALT SERPL-CCNC: 34 U/L (ref 9–52)
ANION GAP SERPL CALC-SCNC: 8 MMOL/L
AST SERPL-CCNC: 23 U/L (ref 14–36)
BUN SERPL-SCNC: 13 MG/DL (ref 7–17)
CALCIUM SPEC-MCNC: 9 MG/DL (ref 8.4–10.2)
CHLORIDE SERPL-SCNC: 97 MMOL/L (ref 98–107)
CO2 SERPL-SCNC: 30 MMOL/L (ref 22–30)
GLUCOSE BLD-MCNC: 105 MG/DL (ref 75–99)
GLUCOSE BLD-MCNC: 115 MG/DL (ref 75–99)
GLUCOSE BLD-MCNC: 117 MG/DL (ref 75–99)
GLUCOSE BLD-MCNC: 128 MG/DL (ref 75–99)
GLUCOSE SERPL-MCNC: 120 MG/DL (ref 74–99)
MAGNESIUM SPEC-SCNC: 1.8 MG/DL (ref 1.6–2.3)
POTASSIUM SERPL-SCNC: 4.1 MMOL/L (ref 3.5–5.1)
PROT SERPL-MCNC: 6.7 G/DL (ref 6.3–8.2)
SODIUM SERPL-SCNC: 135 MMOL/L (ref 137–145)
TRIGL SERPL-MCNC: 76 MG/DL (ref ?–150)

## 2019-09-24 RX ADMIN — PANTOPRAZOLE SODIUM SCH MG: 40 INJECTION, POWDER, FOR SOLUTION INTRAVENOUS at 09:34

## 2019-09-24 RX ADMIN — HYDROMORPHONE HYDROCHLORIDE PRN MG: 1 INJECTION, SOLUTION INTRAMUSCULAR; INTRAVENOUS; SUBCUTANEOUS at 03:48

## 2019-09-24 RX ADMIN — INSULIN ASPART SCH: 100 INJECTION, SOLUTION INTRAVENOUS; SUBCUTANEOUS at 18:15

## 2019-09-24 RX ADMIN — HEPARIN SODIUM SCH UNIT: 5000 INJECTION, SOLUTION INTRAVENOUS; SUBCUTANEOUS at 01:05

## 2019-09-24 RX ADMIN — CEFAZOLIN SCH: 330 INJECTION, POWDER, FOR SOLUTION INTRAMUSCULAR; INTRAVENOUS at 12:53

## 2019-09-24 RX ADMIN — IPRATROPIUM BROMIDE AND ALBUTEROL SULFATE SCH ML: .5; 3 SOLUTION RESPIRATORY (INHALATION) at 21:41

## 2019-09-24 RX ADMIN — LEUCINE, PHENYLALANINE, LYSINE, METHIONINE, ISOLEUCINE, VALINE, HISTIDINE, THREONINE, TRYPTOPHAN, ALANINE, GLYCINE, ARGININE, PROLINE, SERINE, TYROSINE, DEXTROSE SCH: 365; 280; 290; 200; 300; 290; 240; 210; 90; 1035; 515; 575; 340; 250; 20; 15 INJECTION INTRAVENOUS at 17:33

## 2019-09-24 RX ADMIN — HYDROMORPHONE HYDROCHLORIDE PRN MG: 1 INJECTION, SOLUTION INTRAMUSCULAR; INTRAVENOUS; SUBCUTANEOUS at 12:43

## 2019-09-24 RX ADMIN — HYDROMORPHONE HYDROCHLORIDE PRN MG: 1 INJECTION, SOLUTION INTRAMUSCULAR; INTRAVENOUS; SUBCUTANEOUS at 15:50

## 2019-09-24 RX ADMIN — INSULIN ASPART SCH: 100 INJECTION, SOLUTION INTRAVENOUS; SUBCUTANEOUS at 00:43

## 2019-09-24 RX ADMIN — INSULIN ASPART SCH: 100 INJECTION, SOLUTION INTRAVENOUS; SUBCUTANEOUS at 07:02

## 2019-09-24 RX ADMIN — IPRATROPIUM BROMIDE AND ALBUTEROL SULFATE SCH ML: .5; 3 SOLUTION RESPIRATORY (INHALATION) at 12:39

## 2019-09-24 RX ADMIN — HEPARIN SODIUM SCH UNIT: 5000 INJECTION, SOLUTION INTRAVENOUS; SUBCUTANEOUS at 09:34

## 2019-09-24 RX ADMIN — IPRATROPIUM BROMIDE AND ALBUTEROL SULFATE SCH ML: .5; 3 SOLUTION RESPIRATORY (INHALATION) at 08:13

## 2019-09-24 RX ADMIN — HYDROMORPHONE HYDROCHLORIDE PRN MG: 1 INJECTION, SOLUTION INTRAMUSCULAR; INTRAVENOUS; SUBCUTANEOUS at 00:10

## 2019-09-24 RX ADMIN — INSULIN ASPART SCH: 100 INJECTION, SOLUTION INTRAVENOUS; SUBCUTANEOUS at 12:53

## 2019-09-24 RX ADMIN — LEVOFLOXACIN SCH MLS/HR: 5 INJECTION, SOLUTION INTRAVENOUS at 09:34

## 2019-09-24 RX ADMIN — HYDROMORPHONE HYDROCHLORIDE PRN MG: 1 INJECTION, SOLUTION INTRAMUSCULAR; INTRAVENOUS; SUBCUTANEOUS at 09:38

## 2019-09-24 RX ADMIN — IPRATROPIUM BROMIDE AND ALBUTEROL SULFATE SCH ML: .5; 3 SOLUTION RESPIRATORY (INHALATION) at 16:41

## 2019-09-24 RX ADMIN — HYDROMORPHONE HYDROCHLORIDE PRN MG: 1 INJECTION, SOLUTION INTRAMUSCULAR; INTRAVENOUS; SUBCUTANEOUS at 19:56

## 2019-09-24 RX ADMIN — HEPARIN SODIUM SCH UNIT: 5000 INJECTION, SOLUTION INTRAVENOUS; SUBCUTANEOUS at 16:34

## 2019-09-24 NOTE — P.PN
Subjective


Progress Note Date: 09/24/19


Principal diagnosis: 


 Shortness of breath





 On 09/23/2019 patient was seen in follow-up in the intensive care unit, she has

been an overflow for medical surgical floor, signs are stable, room air pulse ox

is 95%, patient is afebrile, lung sounds are essentially clear to auscultation, 

no rhonchi, no wheezing, no completed shortness of breath, no chest pain, no new

chest x-rays, her blood and sputum cultures have shown no growth.  Patient has 

been treated for suspected aspiration pneumonia, with a combination of Zosyn and

Levaquin, she has completed a course of Zosyn, and remains on Levaquin only at 

this time.  Remains on IV steroids, but patient has really not had any wheezing 

or congestion.  Last chest x-ray was on 09/21/2019 and showed bilateral pleural 

effusions, and bibasilar airspace disease.  Patient has been nothing by mouth in

view of aspiration, she had a modified barium swallow evaluation on 09/20/2019 

which showed aspiration with thin liquids, nectar thick liquids and pudding 

thick consistencies, patient was unable to have a PEG tube inserted related to e

xtensive scarring in the esophagus related to previous radiation treatments, and

patient is going for a tube gastrostomy insertion by Dr. Wright today.  His 

labs have been reviewed, showing white blood cell, 6.0, hemoglobin of 9.2, serum

sodium is 134, the rest of electrolytes and renal profile were unremarkable.  

Patient remains on TPN





On 09/24/2019 patient seen in follow-up on medical surgical floor.  She is awake

and alert, in no acute distress, currently on 2 L of oxygen, it appears that the

patient wears oxygen intermittently with some room air oxygen saturations 

recorded at 92 and above, with occasional hypoxemia in the early morning hours 

at 88% on 2 L.  Patient does not appear to be in any distress, she states her 

breathing is stable, no cough or congestion, lung sounds reveal clear breath 

sounds, yesterday she had done a G-tube inserted, surgical site looks clean dry 

and intact, patient remains on TPN for nutritional support, anticipate starting 

tube feedings possibly today.  And has been rinsing her mouth with different 

types of beverage including Gatorade, however she's been cautioned against 

swallowing anything and she understands it.  She has completed her antibiotics, 

she's been afebrile.  Sputum culture showed no growth.  Today's labs have been 

reviewed, showing sodium of 135, potassium is 4.1, chloride is 97, CO2 is 30, 

BUN of 13 and creatinine 0.23, LFTs were within normal limits no acute events 

overnight, no new chest x-rays





Objective





- Vital Signs


Vital signs: 


                                   Vital Signs











Temp  98.0 F   09/24/19 06:27


 


Pulse  84   09/24/19 12:53


 


Resp  24   09/24/19 06:27


 


BP  111/75   09/24/19 06:27


 


Pulse Ox  88 L  09/24/19 06:27








                                 Intake & Output











 09/23/19 09/24/19 09/24/19





 18:59 06:59 18:59


 


Intake Total 1551  


 


Output Total 155 700 


 


Balance 1396 -700 


 


Weight 33 kg  32.5 kg


 


Intake:   


 


    


 


  Intake, IV Titration 1051  





  Amount   


 


    Mvi, Adult No.4 with Vit 1051  





    K 10 ml Trace (Conc-1Ml/   





    Dose) 1 ml Parenteral   





    Electrolytes 20 ml   





    Potassium Chloride 40 meq   





    In Amino Acid 5%-D15w 1,   





    000 ml @ 45 mls/hr IV .   





    [id number] [facility] Rx#:[id number]   


 


Output:   


 


  Urine 150 700 


 


  Estimated Blood Loss 5  


 


Other:   


 


  Voiding Method Indwelling Catheter Indwelling Catheter 


 


  # Voids 1  








                       ABP, PAP, CO, CI - Last Documented











Arterial Blood Pressure        75/63

















- Exam


 GENERAL EXAM: Alert, very pleasant, cachectic 54-year-old white female, on room

air, comfortable in no apparent distress.


HEAD: Normocephalic/atraumatic.


EYES: Normal reaction of pupils, equal size.  Conjunctiva pink, sclera white.


NOSE: Clear with pink turbinates.


THROAT: No erythema or exudates.


NECK: No masses, no JVD, no thyroid enlargement, no adenopathy.


CHEST: No chest wall deformity.  Symmetrical expansion. 


LUNGS: Equal air entry with no crackles, wheeze, rhonchi or dullness.


CVS: Regular rate and rhythm, normal S1 and S2, no gallops, no murmurs, no rubs


ABDOMEN: Soft, nontender.  No hepatosplenomegaly, normal bowel sounds, no 

guarding or rigidity.  Gastric tube insertion site is clean dry and intact, 

gastric tube is covered with the dressing


EXTREMITIES: No clubbing, no edema, no cyanosis, 2+ pulses and upper and lower 

extremities.


MUSCULOSKELETAL: Muscle strength and tone normal.


SPINE: No scoliosis or deformity


SKIN: No rashes


CENTRAL NERVOUS SYSTEM: Alert and oriented -3.  No focal deficits, tone is nor

mal in all 4 extremities.


PSYCHIATRIC: Alert and oriented -3.  Appropriate affect.  Intact judgment and 

insight.











- Labs


CBC & Chem 7: 


                                 09/23/19 04:34





                                 09/24/19 06:31


Labs: 


                  Abnormal Lab Results - Last 24 Hours (Table)











  09/24/19 09/24/19 09/24/19 Range/Units





  00:40 06:31 06:57 


 


Sodium   135 L   (137-145)  mmol/L


 


Chloride   97 L   ()  mmol/L


 


Creatinine   0.23 L   (0.52-1.04)  mg/dL


 


Glucose   120 H   (74-99)  mg/dL


 


POC Glucose (mg/dL)  115 H   128 H  (75-99)  mg/dL


 


Albumin   3.4 L   (3.5-5.0)  g/dL














  09/24/19 Range/Units





  12:38 


 


Sodium   (137-145)  mmol/L


 


Chloride   ()  mmol/L


 


Creatinine   (0.52-1.04)  mg/dL


 


Glucose   (74-99)  mg/dL


 


POC Glucose (mg/dL)  117 H  (75-99)  mg/dL


 


Albumin   (3.5-5.0)  g/dL














Assessment and Plan


Plan: 


 Assessment:





#1.  Acute hypoxemic and hypercapnic respiratory failure secondary to aspiration

pneumonia, much improved, patient required a brief period of intubation and 

placement on mechanical ventilation on September 13 and extubation on September 14





#2.  Pulmonary lymphadenopathy, possibly related to prior history of laryngeal 

cancer





#3.  Acute metabolic encephalopathy in large part related to the patient's 

profound hypovolemic hyponatremia, which significantly improved





#4.  Severe hypovolemic hyponatremia, resolved





#5.  History of laryngeal carcinoma, status post chemo and radiation therapy, 

with last treatments in January 2019, she was treated at [facility]





#6.  Severe protein calorie malnutrition





#7.  Anorexia/cachexia syndrome





#8.  History of previous tobacco dependence





#9.  History of anemia of chronic disease





#10.  Severe electrolyte disturbance, resolved





#11.  Hypoalbuminemia





#12.  Significantly poor dentition with dental caries





#13.  Dysphagia, aspiration noted on barium swallow with all types of 

consistencies, requiring insertion of a gastric tube





Plan:





Clinically stable, breathing is stable, encourage patient to sit up in a chair, 

deep breathing cough, ambulate.  No acute events overnight, patient has 

completed her antibiotics, all cultures remain negative, anticipate initiation 

of tube feedings today, she may continue on TPN until her tube feedings are up 

to goal, no acute events overnight.  Disharge planning is in progress. 





I performed a history & physical examination of the patient and discussed their 

management with my nurse practitioner, Romina Brown.  I reviewed the nurse 

practitioner's note and agree with the documented findings and plan of care.  

Lung sounds are positive for clear breath sounds.  The findings and the 

impression was discussed with the patient.  I attest to the documentation by the

nurse practitioner. 








Time with Patient: Less than 30

## 2019-09-24 NOTE — P.PN
Subjective


Progress Note Date: 09/24/19





CHIEF COMPLAINT: Dysphagia, malnutrition





HISTORY OF PRESENT ILLNESS: 54-year-old female who underwent gastrostomy tube 

yesterday with Dr. Wright. She reports surgical site pain is tolerable. PPN 

infusing. Tube feedings have not been started at the time of my examination.





PHYSICAL EXAM: 


VITAL SIGNS: Reviewed.


GENERAL: Well-developed in no acute distress. 


HEENT:  No sclera icterus. Extraocular movements grossly intact.  Moist buccal 

mucosa. Head is atraumatic, normocephalic. 


ABDOMEN:  Soft.  Nondistended. Nontender. G-tube noted. Surgical dressing clean 

dry intact.


NEUROLOGIC: Alert and oriented. Cranial nerves II through XII grossly intact.





ASSESSMENT: 


1.  Severe calorie protein malnutrition and dysphagia, s/p open gastrostomy tube

placement





PLAN: 


1. Continue TPN until patient tolerating TF at 30cc/hr.


2. Begin tube feedings at 10cc/hr today. Increase by 10cc every 12 hours as t

olerated. Goal rate 40cc


3. NO MEDICATIONS to be given via g-tube today per Dr. Wright





Nurse practitioner note has been reviewed by physician. Signing provider agrees 

with the documented findings, assessment, and plan of care. 








Objective





- Vital Signs


Vital signs: 


                                   Vital Signs











Temp  98.4 F   09/24/19 13:07


 


Pulse  117 H  09/24/19 13:07


 


Resp  20   09/24/19 13:07


 


BP  93/65   09/24/19 13:07


 


Pulse Ox  95   09/24/19 13:07








                                 Intake & Output











 09/23/19 09/24/19 09/24/19





 18:59 06:59 18:59


 


Intake Total 1551  


 


Output Total 155 700 


 


Balance 1396 -700 


 


Weight 33 kg  32.5 kg


 


Intake:   


 


    


 


  Intake, IV Titration 1051  





  Amount   


 


    Mvi, Adult No.4 with Vit 1051  





    K 10 ml Trace (Conc-1Ml/   





    Dose) 1 ml Parenteral   





    Electrolytes 20 ml   





    Potassium Chloride 40 meq   





    In Amino Acid 5%-D15w 1,   





    000 ml @ 45 mls/hr IV .   





    [id number] [facility] Rx#:[id number]   


 


Output:   


 


  Urine 150 700 


 


  Estimated Blood Loss 5  


 


Other:   


 


  Voiding Method Indwelling Catheter Indwelling Catheter 


 


  # Voids 1  








                       ABP, PAP, CO, CI - Last Documented











Arterial Blood Pressure        75/63

















- Labs


CBC & Chem 7: 


                                 09/23/19 04:34





                                 09/24/19 06:31


Labs: 


                  Abnormal Lab Results - Last 24 Hours (Table)











  09/24/19 09/24/19 09/24/19 Range/Units





  00:40 06:31 06:57 


 


Sodium   135 L   (137-145)  mmol/L


 


Chloride   97 L   ()  mmol/L


 


Creatinine   0.23 L   (0.52-1.04)  mg/dL


 


Glucose   120 H   (74-99)  mg/dL


 


POC Glucose (mg/dL)  115 H   128 H  (75-99)  mg/dL


 


Albumin   3.4 L   (3.5-5.0)  g/dL














  09/24/19 Range/Units





  12:38 


 


Sodium   (137-145)  mmol/L


 


Chloride   ()  mmol/L


 


Creatinine   (0.52-1.04)  mg/dL


 


Glucose   (74-99)  mg/dL


 


POC Glucose (mg/dL)  117 H  (75-99)  mg/dL


 


Albumin   (3.5-5.0)  g/dL

## 2019-09-24 NOTE — P.PN
Subjective


Progress Note Date: 19


Principal diagnosis: 





Patient is a 53-year-old  female with a past medical history of 

laryngeal cancer status post chemo and radiation, and history of tobacco abuse 

who presented to the emergency department with weakness and difficulty staying 

awake.    Patient underwent treatment for laryngeal cancer in 2019.  

Since that point in time she been having difficulty swallowing in his been 

tolerating pudding.  She's become severely cachectic and weak.  In the ER she 

underwent an extensive evaluation.  She was found to have a sodium of 111, she 

underwent an acute abdominal series which was nonspecific.  She underwent a CT 

of the chest which showed emphysema with extensive pulmonary interstitial 

infiltrates as well as multiple enlarged lymph nodes.  In the emergency 

department she was having respiratory distress and appeared stridorous was 

subsequently admitted to the ICU.  Initially she was 90% on 4 L nasal cannula 

and was found to be significantly acidotic with a pH of 7.13.  She was 

subsequently intubated and placed on mechanical ventilation.  She was seen by 

critical care.  Due to her hypotension she required norepinephrine.  She was 

also seen by nephrology due to her low sodiums.  She did receive 3% normal 

saline due to her altered mentation.  She was started on empiric IV antibiotics 

for possible pneumonia of Levaquin and Zosyn.  She was transitioned from 3% 

saline to normal saline.  She was extubated on  without any difficulties.  

She then developed significant respiratory distress on the morning of  and 

required BiPAP, which was able to be weaned by the morning of . She was 

started on TPN on  due to no ability to feed and severe malnutrition.  

Patient amenable for -Leandro, attempted placement on  unable to do so due 

to scar tissue and inflammation. Would be able to have open G tube, failed MBSS,

plan for open G tube . 





Patient seen and examined follow-up is awake alert, having occasional 

desaturations but does not appear in distress.  TPN continued for nutritional 

support, open G-tube placement yesterday.  Magnesium up to 1.8 today.  Patient 

awaiting placement to the medical [place]





Objective





- Vital Signs


Vital signs: 


                                   Vital Signs











Temp  98.4 F   19 13:07


 


Pulse  117 H  19 13:07


 


Resp  20   19 13:07


 


BP  93/65   19 13:07


 


Pulse Ox  95   19 13:07








                                 Intake & Output











 19





 18:59 06:59 18:59


 


Intake Total 1551  


 


Output Total 155 700 


 


Balance 1396 -700 


 


Weight 33 kg  32.5 kg


 


Intake:   


 


    


 


  Intake, IV Titration 1051  





  Amount   


 


    Mvi, Adult No.4 with Vit 1051  





    K 10 ml Trace (Conc-1Ml/   





    Dose) 1 ml Parenteral   





    Electrolytes 20 ml   





    Potassium Chloride 40 meq   





    In Amino Acid 5%-D15w 1,   





    000 ml @ 45 mls/hr IV .   





    [id number] [facility] Rx#:[id number]   


 


Output:   


 


  Urine 150 700 


 


  Estimated Blood Loss 5  


 


Other:   


 


  Voiding Method Indwelling Catheter Indwelling Catheter 


 


  # Voids 1  








                       ABP, PAP, CO, CI - Last Documented











Arterial Blood Pressure        75/63

















- Exam





Constitutional: No acute distress, awake and alert cachectic appearance





Eyes: Anicteric sclerae, moist conjunctiva, no lid-lag, PERRLA





ENMT: Bilateral temporal wasting 





 NC/AT,Oropharynx clear, no erythema, exudates





Neck:Supple, FROM, no masses, or JVD, No carotid bruits; No thyromegaly





Lungs: Decreased breath sounds bilaterally, unlabored





Cardiovascular: Heart regular in rate and rhythm,  No murmurs, gallops, or rubs 

no peripheral edema





Abdominal: Soft Nontender, nom distended, no guarding, no rebound or  rigidity, 

Normoactive bowel sounds No hepatomegaly, No splenomegaly,  No palpable mass No 

abdominal wall hernia noted 





Skin: Normal temperature, tone, texture, turgor, No induration No subcutaneous 

nodules, No rash, lesions, No ulcers





Extremities:No digital cyanosis No clubbing, Pedal pulses intact and  

symmetrical Radial pulses intact and symmetrical Normal gait and station, No 

calf tenderness   


      


Neuro: Awake alert following commands no focal deficits appreciated








- Labs


CBC & Chem 7: 


                                 19 04:34





                                 19 06:31


Labs: 


                  Abnormal Lab Results - Last 24 Hours (Table)











  19 Range/Units





  00:40 06:31 06:57 


 


Sodium   135 L   (137-145)  mmol/L


 


Chloride   97 L   ()  mmol/L


 


Creatinine   0.23 L   (0.52-1.04)  mg/dL


 


Glucose   120 H   (74-99)  mg/dL


 


POC Glucose (mg/dL)  115 H   128 H  (75-99)  mg/dL


 


Albumin   3.4 L   (3.5-5.0)  g/dL














  19 Range/Units





  12:38 


 


Sodium   (137-145)  mmol/L


 


Chloride   ()  mmol/L


 


Creatinine   (0.52-1.04)  mg/dL


 


Glucose   (74-99)  mg/dL


 


POC Glucose (mg/dL)  117 H  (75-99)  mg/dL


 


Albumin   (3.5-5.0)  g/dL














Assessment and Plan


(1) Severe protein-energy malnutrition


Narrative/Plan: 


Severe protein calorie malnutrition with cachexia and BMI of 11.4 Associated 

with dysphagia 


-Attempted PEG tube on.  Unsuccessful secondary to severe inflammation and 

scar tissue.   open gastrostomy tube placed yesterday on 


- continue TPN until tube feeds are at goal


- Dietary recommendations


- failed MBS


- We will initiate nutritional supplementation with vital 


Current Visit: Yes   Status: Acute   Code(s): E43 - UNSPECIFIED SEVERE PROTEIN-

CALORIE MALNUTRITION   SNOMED Code(s): 490940887


   





(2) Acute respiratory failure with hypoxia and hypercapnia


Narrative/Plan: 


Acute hypoxic respiratory failure secondary to Pneumonia, possible gram 

negative, likely aspiration


-Continue with Zosyn, Levaquin D # 10


-Pulmonary hygiene


-Follow chest x-ray until clear


-Pulmonary recommendations appreciated


Current Visit: Yes   Status: Acute   Code(s): J96.01 - ACUTE RESPIRATORY FAILURE

WITH HYPOXIA; J96.02 - ACUTE RESPIRATORY FAILURE WITH HYPERCAPNIA   SNOMED 

Code(s): 660130404


   





(3) Hyponatremia


Narrative/Plan: 


-IV fluids completed, on TPN


-Nephrology recommendations


-Status post 3% normal saline


- follow sodium level 


Current Visit: Yes   Status: Acute   Code(s): E87.1 - HYPO-OSMOLALITY AND 

HYPONATREMIA   SNOMED Code(s): 25532547


   





(4) Mediastinal lymphadenopathy


Narrative/Plan: 





* CT of the chest showing extensive bronchial and subcarinal adenopathy


* Suspected to be metastatic versus lung primary


* Will need urgent follow-up with her oncologist at [facility]. Patient aware.


Current Visit: Yes   Status: Acute   Code(s): R59.0 - LOCALIZED ENLARGED LYMPH 

NODES   SNOMED Code(s): 71596060


   





(5) History of laryngeal cancer


Narrative/Plan: 





* Most recent treatment in 2019


Current Visit: Yes   Status: Chronic   Code(s): Z85.21 - PERSONAL HISTORY OF 

MALIGNANT NEOPLASM OF LARYNX   SNOMED Code(s): 530755849


   





(6) Weakness


Narrative/Plan: 





* Secondary to profound hyponatremia


Current Visit: Yes   Status: Acute   Code(s): R53.1 - WEAKNESS   SNOMED Code(s):

58710848


   





(7) Metabolic encephalopathy


Narrative/Plan: 





* Secondary to hyponatremia


Current Visit: Yes   Status: Resolved   Code(s): G93.41 - METABOLIC 

ENCEPHALOPATHY   SNOMED Code(s): 46865739


   





(8) Hypokalemia


Narrative/Plan: 





* Continue to monitor daily continued on potassium replacement protocol


* We'll replace and recheck


Current Visit: Yes   Status: Resolved   Code(s): E87.6 - HYPOKALEMIA   SNOMED 

Code(s): 25414594


   


Plan: 





Disposition


* Patient medically stable for discharge awaiting placement to medical [place]


* We'll discontinue Anderson catheter today

## 2019-09-25 VITALS — TEMPERATURE: 97.6 F | DIASTOLIC BLOOD PRESSURE: 68 MMHG | RESPIRATION RATE: 24 BRPM | SYSTOLIC BLOOD PRESSURE: 102 MMHG

## 2019-09-25 VITALS — HEART RATE: 120 BPM

## 2019-09-25 LAB
ALBUMIN SERPL-MCNC: 2.6 G/DL (ref 3.5–5)
ALP SERPL-CCNC: 74 U/L (ref 38–126)
ALT SERPL-CCNC: 34 U/L (ref 9–52)
ANION GAP SERPL CALC-SCNC: 2 MMOL/L
AST SERPL-CCNC: 19 U/L (ref 14–36)
BUN SERPL-SCNC: 15 MG/DL (ref 7–17)
CALCIUM SPEC-MCNC: 8.3 MG/DL (ref 8.4–10.2)
CHLORIDE SERPL-SCNC: 98 MMOL/L (ref 98–107)
CO2 SERPL-SCNC: 34 MMOL/L (ref 22–30)
GLUCOSE BLD-MCNC: 114 MG/DL (ref 75–99)
GLUCOSE BLD-MCNC: 119 MG/DL (ref 75–99)
GLUCOSE BLD-MCNC: 129 MG/DL (ref 75–99)
GLUCOSE BLD-MCNC: 134 MG/DL (ref 75–99)
GLUCOSE SERPL-MCNC: 493 MG/DL (ref 74–99)
MAGNESIUM SPEC-SCNC: 2.9 MG/DL (ref 1.6–2.3)
POTASSIUM SERPL-SCNC: 5.8 MMOL/L (ref 3.5–5.1)
PROT SERPL-MCNC: 5.2 G/DL (ref 6.3–8.2)
SODIUM SERPL-SCNC: 134 MMOL/L (ref 137–145)

## 2019-09-25 RX ADMIN — HEPARIN SODIUM SCH UNIT: 5000 INJECTION, SOLUTION INTRAVENOUS; SUBCUTANEOUS at 16:06

## 2019-09-25 RX ADMIN — IPRATROPIUM BROMIDE AND ALBUTEROL SULFATE SCH ML: .5; 3 SOLUTION RESPIRATORY (INHALATION) at 07:43

## 2019-09-25 RX ADMIN — CEFAZOLIN SCH MLS/HR: 330 INJECTION, POWDER, FOR SOLUTION INTRAMUSCULAR; INTRAVENOUS at 12:19

## 2019-09-25 RX ADMIN — INSULIN ASPART SCH: 100 INJECTION, SOLUTION INTRAVENOUS; SUBCUTANEOUS at 12:20

## 2019-09-25 RX ADMIN — SOYBEAN OIL SCH: 20 INJECTION, SOLUTION INTRAVENOUS at 16:05

## 2019-09-25 RX ADMIN — INSULIN ASPART SCH: 100 INJECTION, SOLUTION INTRAVENOUS; SUBCUTANEOUS at 00:01

## 2019-09-25 RX ADMIN — IPRATROPIUM BROMIDE AND ALBUTEROL SULFATE SCH ML: .5; 3 SOLUTION RESPIRATORY (INHALATION) at 15:08

## 2019-09-25 RX ADMIN — IPRATROPIUM BROMIDE AND ALBUTEROL SULFATE SCH ML: .5; 3 SOLUTION RESPIRATORY (INHALATION) at 11:49

## 2019-09-25 RX ADMIN — INSULIN ASPART SCH: 100 INJECTION, SOLUTION INTRAVENOUS; SUBCUTANEOUS at 17:13

## 2019-09-25 RX ADMIN — HYDROMORPHONE HYDROCHLORIDE PRN MG: 1 INJECTION, SOLUTION INTRAMUSCULAR; INTRAVENOUS; SUBCUTANEOUS at 00:05

## 2019-09-25 RX ADMIN — INSULIN ASPART SCH: 100 INJECTION, SOLUTION INTRAVENOUS; SUBCUTANEOUS at 07:43

## 2019-09-25 RX ADMIN — HYDROMORPHONE HYDROCHLORIDE PRN MG: 1 INJECTION, SOLUTION INTRAMUSCULAR; INTRAVENOUS; SUBCUTANEOUS at 08:50

## 2019-09-25 RX ADMIN — HEPARIN SODIUM SCH UNIT: 5000 INJECTION, SOLUTION INTRAVENOUS; SUBCUTANEOUS at 07:44

## 2019-09-25 RX ADMIN — HEPARIN SODIUM SCH UNIT: 5000 INJECTION, SOLUTION INTRAVENOUS; SUBCUTANEOUS at 00:05

## 2019-09-25 RX ADMIN — PANTOPRAZOLE SODIUM SCH MG: 40 INJECTION, POWDER, FOR SOLUTION INTRAVENOUS at 07:44

## 2019-09-25 RX ADMIN — HYDROMORPHONE HYDROCHLORIDE PRN MG: 1 INJECTION, SOLUTION INTRAMUSCULAR; INTRAVENOUS; SUBCUTANEOUS at 14:07

## 2019-09-25 RX ADMIN — HYDROMORPHONE HYDROCHLORIDE PRN MG: 1 INJECTION, SOLUTION INTRAMUSCULAR; INTRAVENOUS; SUBCUTANEOUS at 04:26

## 2019-09-25 NOTE — P.PN
Subjective


Progress Note Date: 09/25/19





CHIEF COMPLAINT: Dysphagia, malnutrition





HISTORY OF PRESENT ILLNESS: 54-year-old female who underwent gastrostomy tube 

insertion with Dr. Wright. Patient reports pain at surgical site but reports 

it is tolerable. She is receiving TPN. Tube feeding infusing at 20cc/hr. 

Tolerating well. 





PHYSICAL EXAM: 


VITAL SIGNS: Reviewed.


GENERAL: Well-developed in no acute distress. 


HEENT:  No sclera icterus. Extraocular movements grossly intact.  Moist buccal 

mucosa. Head is atraumatic, normocephalic. 


ABDOMEN:  Soft.  Nondistended. Nontender. G-tube noted. Surgical dressing noted 

with shadowing present.


NEUROLOGIC: Alert and oriented. Cranial nerves II through XII grossly intact.





ASSESSMENT: 


1.  Severe calorie protein malnutrition and dysphagia, s/p open gastrostomy tube

placement





PLAN: 


1. Continue tube feedings as tolerated. Increase by 10cc every 12 hours as 

tolerated. Goal rate 40cc


2. Continue TPN until patient tolerating TF at 30cc/hr then may wean off TPN





Nurse practitioner note has been reviewed by physician. Signing provider agrees 

with the documented findings, assessment, and plan of care. 








Objective





- Vital Signs


Vital signs: 


                                   Vital Signs











Temp  98.6 F   09/25/19 07:40


 


Pulse  102 H  09/25/19 12:02


 


Resp  22   09/25/19 07:40


 


BP  103/72   09/25/19 07:40


 


Pulse Ox  93 L  09/25/19 07:40








                                 Intake & Output











 09/24/19 09/25/19 09/25/19





 18:59 06:59 18:59


 


Intake Total 10 40 20


 


Output Total 1000  


 


Balance -990 40 20


 


Weight 32.5 kg 33 kg 


 


Intake:   


 


  Oral  0 


 


  Tube Feeding 10 40 20


 


Output:   


 


  Urine 1000  


 


    Uretheral (Anderson) 700  


 


Other:   


 


  Voiding Method   Bedside Commode


 


  # Voids  8 








                       ABP, PAP, CO, CI - Last Documented











Arterial Blood Pressure        75/63

















- Labs


CBC & Chem 7: 


                                 09/23/19 04:34





                                 09/25/19 08:00


Labs: 


                  Abnormal Lab Results - Last 24 Hours (Table)











  09/24/19 09/24/19 09/24/19 Range/Units





  12:38 17:51 23:57 


 


Sodium     (137-145)  mmol/L


 


Potassium     (3.5-5.1)  mmol/L


 


Carbon Dioxide     (22-30)  mmol/L


 


Creatinine     (0.52-1.04)  mg/dL


 


Glucose     (74-99)  mg/dL


 


POC Glucose (mg/dL)  117 H  105 H  114 H  (75-99)  mg/dL


 


Calcium     (8.4-10.2)  mg/dL


 


Magnesium     (1.6-2.3)  mg/dL


 


Total Protein     (6.3-8.2)  g/dL


 


Albumin     (3.5-5.0)  g/dL














  09/25/19 09/25/19 Range/Units





  07:34 08:00 


 


Sodium   134 L  (137-145)  mmol/L


 


Potassium   5.8 H  (3.5-5.1)  mmol/L


 


Carbon Dioxide   34 H  (22-30)  mmol/L


 


Creatinine   0.34 L  (0.52-1.04)  mg/dL


 


Glucose   493 H  (74-99)  mg/dL


 


POC Glucose (mg/dL)  119 H   (75-99)  mg/dL


 


Calcium   8.3 L  (8.4-10.2)  mg/dL


 


Magnesium   2.9 H  (1.6-2.3)  mg/dL


 


Total Protein   5.2 L  (6.3-8.2)  g/dL


 


Albumin   2.6 L  (3.5-5.0)  g/dL

## 2019-09-25 NOTE — P.DS
Providers


Date of admission: 


19 18:58





Expected date of discharge: 19


Attending physician: 


Gentry Sky MD





Consults: 





                                        





19 18:58


Consult Physician Stat 


   Consulting Provider: Darnell Howell


   Consult Reason/Comments: known


   Do you want consulting provider notified?: Already Contacted





19 20:38


Consult Physician Routine 


   Consulting Provider: Justice Grimaldo


   Consult Reason/Comments: hypoNa


   Do you want consulting provider notified?: Yes





19 11:10


Consult Physician Routine 


   Consulting Provider: Keith Pereira


   Consult Reason/Comments: poor dentition


   Do you want consulting provider notified?: Yes





19 11:25


Consult Physician Routine 


   Consulting Provider: Deep Wright


   Consult Reason/Comments: PEG tube


   Do you want consulting provider notified?: Already Contacted











Primary care physician: 


Stated None








- Discharge Diagnosis(es)


(1) Severe protein-energy malnutrition


Current Visit: Yes   Status: Acute   





(2) Acute respiratory failure with hypoxia and hypercapnia


Current Visit: Yes   Status: Acute   





(3) Hyponatremia


Current Visit: Yes   Status: Acute   





(4) Mediastinal lymphadenopathy


Current Visit: Yes   Status: Acute   





(5) History of laryngeal cancer


Current Visit: Yes   Status: Chronic   





(6) Weakness


Current Visit: Yes   Status: Acute   





(7) Metabolic encephalopathy


Current Visit: Yes   Status: Resolved   





(8) Hypokalemia


Current Visit: Yes   Status: Resolved   


Hospital Course: 





Patient is a 53-year-old  female with a past medical history of 

laryngeal cancer status post chemo and radiation, and history of tobacco abuse 

who presented to the emergency department with weakness and difficulty staying 

awake.    Patient underwent treatment for laryngeal cancer in 2019.  

Since that point in time she been having difficulty swallowing in his been 

tolerating pudding.  She's become severely cachectic and weak.  In the ER she 

underwent an extensive evaluation.  She was found to have a sodium of 111, she 

underwent an acute abdominal series which was nonspecific.  She underwent a CT 

of the chest which showed emphysema with extensive pulmonary interstitial 

infiltrates as well as multiple enlarged lymph nodes.  In the emergency 

department she was having respiratory distress and appeared stridorous was 

subsequently admitted to the ICU.  Initially she was 90% on 4 L nasal cannula 

and was found to be significantly acidotic with a pH of 7.13.  She was 

subsequently intubated and placed on mechanical ventilation.  She was seen by 

critical care.  Due to her hypotension she required norepinephrine.  She was 

also seen by nephrology due to her low sodiums.  She did receive 3% normal 

saline due to her altered mentation.  She was started on empiric IV antibiotics 

for possible aspiation pneumonia of Levaquin and Zosyn.  She was transitioned 

from 3% saline to normal saline.  She was extubated on  without any 

difficulties.  She then developed significant respiratory distress on the 

morning of  and required BiPAP, which was able to be weaned by the morning 

of . She was started on TPN on  due to no ability to feed and severe 

malnutrition.  Patient amenable for -Leandro, attempted placement on  unable

to do so due to scar tissue and inflammation.  open G tube was subsequently 

paced  after failed MBSS. 


Patient Condition at Discharge: Poor





Plan - Discharge Summary


Discharge Rx Participant: Yes


New Discharge Prescriptions: 


Discontinued


   Ibuprofen [Advil] 200 mg PO Q8HR PRN


     PRN Reason: Pain


   guaiFENesin [Mucinex] 600 mg PO BID PRN


     PRN Reason: Congestion


Follow up Appointment(s)/Referral(s): 


Keith Pereira DDS [STAFF PHYSICIAN] - 1 Week


None,Stated [Primary Care Provider] - 1-2 days


Deep Wright MD [STAFF PHYSICIAN] - 1 Week


Patient Instructions/Handouts:  Hyponatremia (DC)


Activity/Diet/Wound Care/Special Instructions: 


Up with assist, fall precautions.





Daily weight





G tube with Vital 1.2 to infuse at goal of 40ml/hr. Water flush of 30ml every 4 

hours. Change dressing daily. 





Duble lumen PICC line placed in left upper arm on 19





Mediport to left upper chest placed on 19





Optifoam on coccyx and upper back for skin protection. 


Discharge Disposition: TRANSFER TO SNF/ECF

## 2019-09-26 ENCOUNTER — HOSPITAL ENCOUNTER (INPATIENT)
Dept: HOSPITAL 47 - EC | Age: 54
LOS: 11 days | Discharge: NURSING, REHAB OR LONG-TERM CARE | DRG: 3 | End: 2019-10-07
Attending: HOSPITALIST | Admitting: HOSPITALIST
Payer: COMMERCIAL

## 2019-09-26 VITALS — BODY MASS INDEX: 12.7 KG/M2

## 2019-09-26 DIAGNOSIS — Z91.018: ICD-10-CM

## 2019-09-26 DIAGNOSIS — Z83.3: ICD-10-CM

## 2019-09-26 DIAGNOSIS — Z92.21: ICD-10-CM

## 2019-09-26 DIAGNOSIS — R64: ICD-10-CM

## 2019-09-26 DIAGNOSIS — E61.1: ICD-10-CM

## 2019-09-26 DIAGNOSIS — G70.9: ICD-10-CM

## 2019-09-26 DIAGNOSIS — Z87.891: ICD-10-CM

## 2019-09-26 DIAGNOSIS — J98.11: ICD-10-CM

## 2019-09-26 DIAGNOSIS — R59.0: ICD-10-CM

## 2019-09-26 DIAGNOSIS — J96.21: ICD-10-CM

## 2019-09-26 DIAGNOSIS — Z93.1: ICD-10-CM

## 2019-09-26 DIAGNOSIS — Z82.49: ICD-10-CM

## 2019-09-26 DIAGNOSIS — F41.9: ICD-10-CM

## 2019-09-26 DIAGNOSIS — Z85.21: ICD-10-CM

## 2019-09-26 DIAGNOSIS — Z98.890: ICD-10-CM

## 2019-09-26 DIAGNOSIS — J96.22: Primary | ICD-10-CM

## 2019-09-26 DIAGNOSIS — Z98.51: ICD-10-CM

## 2019-09-26 DIAGNOSIS — I95.9: ICD-10-CM

## 2019-09-26 DIAGNOSIS — Z87.01: ICD-10-CM

## 2019-09-26 DIAGNOSIS — Z66: ICD-10-CM

## 2019-09-26 DIAGNOSIS — E43: ICD-10-CM

## 2019-09-26 DIAGNOSIS — Z92.3: ICD-10-CM

## 2019-09-26 DIAGNOSIS — J90: ICD-10-CM

## 2019-09-26 DIAGNOSIS — J98.09: ICD-10-CM

## 2019-09-26 DIAGNOSIS — D63.8: ICD-10-CM

## 2019-09-26 DIAGNOSIS — Z82.5: ICD-10-CM

## 2019-09-26 DIAGNOSIS — Z82.3: ICD-10-CM

## 2019-09-26 DIAGNOSIS — J44.9: ICD-10-CM

## 2019-09-26 DIAGNOSIS — E87.6: ICD-10-CM

## 2019-09-26 DIAGNOSIS — E87.1: ICD-10-CM

## 2019-09-26 DIAGNOSIS — R13.10: ICD-10-CM

## 2019-09-26 LAB
ALBUMIN SERPL-MCNC: 3.3 G/DL (ref 3.5–5)
ALP SERPL-CCNC: 127 U/L (ref 38–126)
ALT SERPL-CCNC: 34 U/L (ref 9–52)
ANION GAP SERPL CALC-SCNC: 5 MMOL/L
APTT BLD: 26.7 SEC (ref 22–30)
AST SERPL-CCNC: 19 U/L (ref 14–36)
BASOPHILS # BLD AUTO: 0 K/UL (ref 0–0.2)
BASOPHILS NFR BLD AUTO: 0 %
BUN SERPL-SCNC: 21 MG/DL (ref 7–17)
CALCIUM SPEC-MCNC: 9.7 MG/DL (ref 8.4–10.2)
CHLORIDE SERPL-SCNC: 98 MMOL/L (ref 98–107)
CO2 BLDA-SCNC: 36 MMOL/L (ref 19–24)
CO2 SERPL-SCNC: 37 MMOL/L (ref 22–30)
EOSINOPHIL # BLD AUTO: 0 K/UL (ref 0–0.7)
EOSINOPHIL NFR BLD AUTO: 0 %
ERYTHROCYTE [DISTWIDTH] IN BLOOD BY AUTOMATED COUNT: 3.57 M/UL (ref 3.8–5.4)
ERYTHROCYTE [DISTWIDTH] IN BLOOD: 14.3 % (ref 11.5–15.5)
GLUCOSE BLD-MCNC: 144 MG/DL (ref 75–99)
GLUCOSE SERPL-MCNC: 89 MG/DL (ref 74–99)
HCO3 BLDA-SCNC: 34 MMOL/L (ref 21–25)
HCT VFR BLD AUTO: 35.1 % (ref 34–46)
HGB BLD-MCNC: 11 GM/DL (ref 11.4–16)
INR PPP: 1.1 (ref ?–1.2)
LYMPHOCYTES # SPEC AUTO: 0.1 K/UL (ref 1–4.8)
LYMPHOCYTES NFR SPEC AUTO: 1 %
MAGNESIUM SPEC-SCNC: 2 MG/DL (ref 1.6–2.3)
MCH RBC QN AUTO: 31 PG (ref 25–35)
MCHC RBC AUTO-ENTMCNC: 31.5 G/DL (ref 31–37)
MCV RBC AUTO: 98.4 FL (ref 80–100)
MONOCYTES # BLD AUTO: 0.3 K/UL (ref 0–1)
MONOCYTES NFR BLD AUTO: 2 %
NEUTROPHILS # BLD AUTO: 13.5 K/UL (ref 1.3–7.7)
NEUTROPHILS NFR BLD AUTO: 97 %
PCO2 BLDA: 67 MMHG (ref 35–45)
PH BLDA: 7.32 [PH] (ref 7.35–7.45)
PLATELET # BLD AUTO: 529 K/UL (ref 150–450)
PO2 BLDA: 64 MMHG (ref 83–108)
POTASSIUM SERPL-SCNC: 4 MMOL/L (ref 3.5–5.1)
PROT SERPL-MCNC: 6.7 G/DL (ref 6.3–8.2)
PT BLD: 11.7 SEC (ref 9–12)
SODIUM SERPL-SCNC: 140 MMOL/L (ref 137–145)
WBC # BLD AUTO: 14 K/UL (ref 3.8–10.6)

## 2019-09-26 PROCEDURE — 85730 THROMBOPLASTIN TIME PARTIAL: CPT

## 2019-09-26 PROCEDURE — 36415 COLL VENOUS BLD VENIPUNCTURE: CPT

## 2019-09-26 PROCEDURE — 71045 X-RAY EXAM CHEST 1 VIEW: CPT

## 2019-09-26 PROCEDURE — 36600 WITHDRAWAL OF ARTERIAL BLOOD: CPT

## 2019-09-26 PROCEDURE — 85025 COMPLETE CBC W/AUTO DIFF WBC: CPT

## 2019-09-26 PROCEDURE — 85027 COMPLETE CBC AUTOMATED: CPT

## 2019-09-26 PROCEDURE — 96374 THER/PROPH/DIAG INJ IV PUSH: CPT

## 2019-09-26 PROCEDURE — 31624 DX BRONCHOSCOPE/LAVAGE: CPT

## 2019-09-26 PROCEDURE — 82607 VITAMIN B-12: CPT

## 2019-09-26 PROCEDURE — 82746 ASSAY OF FOLIC ACID SERUM: CPT

## 2019-09-26 PROCEDURE — 99291 CRITICAL CARE FIRST HOUR: CPT

## 2019-09-26 PROCEDURE — 83605 ASSAY OF LACTIC ACID: CPT

## 2019-09-26 PROCEDURE — 71250 CT THORAX DX C-: CPT

## 2019-09-26 PROCEDURE — 71046 X-RAY EXAM CHEST 2 VIEWS: CPT

## 2019-09-26 PROCEDURE — 87205 SMEAR GRAM STAIN: CPT

## 2019-09-26 PROCEDURE — 87040 BLOOD CULTURE FOR BACTERIA: CPT

## 2019-09-26 PROCEDURE — 83550 IRON BINDING TEST: CPT

## 2019-09-26 PROCEDURE — 96375 TX/PRO/DX INJ NEW DRUG ADDON: CPT

## 2019-09-26 PROCEDURE — 84132 ASSAY OF SERUM POTASSIUM: CPT

## 2019-09-26 PROCEDURE — 89050 BODY FLUID CELL COUNT: CPT

## 2019-09-26 PROCEDURE — 31645 BRNCHSC W/THER ASPIR 1ST: CPT

## 2019-09-26 PROCEDURE — 87116 MYCOBACTERIA CULTURE: CPT

## 2019-09-26 PROCEDURE — 84145 PROCALCITONIN (PCT): CPT

## 2019-09-26 PROCEDURE — 87206 SMEAR FLUORESCENT/ACID STAI: CPT

## 2019-09-26 PROCEDURE — 85610 PROTHROMBIN TIME: CPT

## 2019-09-26 PROCEDURE — 94640 AIRWAY INHALATION TREATMENT: CPT

## 2019-09-26 PROCEDURE — 87070 CULTURE OTHR SPECIMN AEROBIC: CPT

## 2019-09-26 PROCEDURE — 83880 ASSAY OF NATRIURETIC PEPTIDE: CPT

## 2019-09-26 PROCEDURE — 80048 BASIC METABOLIC PNL TOTAL CA: CPT

## 2019-09-26 PROCEDURE — 94003 VENT MGMT INPAT SUBQ DAY: CPT

## 2019-09-26 PROCEDURE — 81001 URINALYSIS AUTO W/SCOPE: CPT

## 2019-09-26 PROCEDURE — 88108 CYTOPATH CONCENTRATE TECH: CPT

## 2019-09-26 PROCEDURE — 82805 BLOOD GASES W/O2 SATURATION: CPT

## 2019-09-26 PROCEDURE — 96361 HYDRATE IV INFUSION ADD-ON: CPT

## 2019-09-26 PROCEDURE — 82728 ASSAY OF FERRITIN: CPT

## 2019-09-26 PROCEDURE — 87102 FUNGUS ISOLATION CULTURE: CPT

## 2019-09-26 PROCEDURE — 88305 TISSUE EXAM BY PATHOLOGIST: CPT

## 2019-09-26 PROCEDURE — 94660 CPAP INITIATION&MGMT: CPT

## 2019-09-26 PROCEDURE — 87086 URINE CULTURE/COLONY COUNT: CPT

## 2019-09-26 PROCEDURE — 83540 ASSAY OF IRON: CPT

## 2019-09-26 PROCEDURE — 83735 ASSAY OF MAGNESIUM: CPT

## 2019-09-26 PROCEDURE — 94002 VENT MGMT INPAT INIT DAY: CPT

## 2019-09-26 PROCEDURE — 80053 COMPREHEN METABOLIC PANEL: CPT

## 2019-09-26 PROCEDURE — 84484 ASSAY OF TROPONIN QUANT: CPT

## 2019-09-26 RX ADMIN — IPRATROPIUM BROMIDE AND ALBUTEROL SULFATE SCH: .5; 3 SOLUTION RESPIRATORY (INHALATION) at 18:43

## 2019-09-26 RX ADMIN — ISODIUM CHLORIDE PRN MG: 0.03 SOLUTION RESPIRATORY (INHALATION) at 19:56

## 2019-09-26 RX ADMIN — CEFAZOLIN SCH: 330 INJECTION, POWDER, FOR SOLUTION INTRAMUSCULAR; INTRAVENOUS at 22:55

## 2019-09-26 NOTE — P.HPIM
History of Present Illness


H&P Date: 19


Chief Complaint: Difficulty breathing





The patient is a 54-year-old  female with a history of laryngeal cancer

status post radiation and chemo who was recently discharged yesterday 19 to

[facility] after being hospitalized for approximately 2 weeks where she had 

been treated for acute respiratory failure with hypoxia and hypercapnia 

requiring mechanical ventilation secondary to presumed aspiration pneumonia, 

during hospitalization she received antibiotic coverage with Zosyn and Levaquin.

 She also presented with severe electrolyte abnormalities requiring correction 

of her hyponatremia and hypokalemia.  She was also noted to have dysphagia with 

aspiration of all types of consistencies noted on barium swallow with subsequent

severe protein energy malnutrition.  The patient had a open G-tube placed  

and was started on vital 1.2 to a goal of 40 mL an hour.


Today the patient presented after being transferred here from [facility] with

difficulty breathing and reported oxygen desaturations down into the low to mid 

80s, on arrival the patient was noted to be in acute respiratory distress as she

was noted to be tachypneic and was placed on a nonrebreather.  Chest x-ray done 

in the ER indicated probable basilar atelectasis and associated effusion.  WBC 

count was 14.  She was given a nebulized breathing treatment, Ativan Valium, 

Solu-Medrol and morphine and recommended for admission





Review of Systems





Pertinent positives per HPI all other review of systems: Negative





Past Medical History


Past Medical History: Cancer


Additional Past Medical History / Comment(s): Pt recently admitted to [facility] on 

19 with severe protein calorie malnourishment, acute respiratory failure 

with hypoxia and hypercapnia was intubated/vented, also had mediatinal lym

phadenopathy, nyponatremia and hypokalemia.    Other Hx:  Laryngeal cancer 

treated with chemo/radiation 2019, dysphagia-NPO/has G tube.


History of Any Multi-Drug Resistant Organisms: None Reported


Past Surgical History: Tubal Ligation


Additional Past Surgical History / Comment(s): Open G tube placement, mediport, 

picc line, laryngoscopy, one fallopian tube removed d/t ectopic pregnancy.


Past Anesthesia/Blood Transfusion Reactions: No Reported Reaction


Past Psychological History: Anxiety


Additional Psychological History / Comment(s): Pt just placed in [facility] on 19.  She is up with a walker.  She is NPO and has a G tube for 

feedings.


Smoking Status: Former smoker


Past Alcohol Use History: None Reported


Additional Past Alcohol Use History / Comment(s): Pt started smoking in  and

quit 5 months ago-2019.


Past Drug Use History: None Reported





- Past Family History


  ** Mother


Family Medical History: Coronary Artery Disease (CAD), Diabetes Mellitus





  ** Father


Family Medical History: COPD, CVA/TIA, Myocardial Infarction (MI)


Additional Family Medical History / Comment(s): Father  of a MI at the age 

of 54 yrs.





Medications and Allergies


                                Home Medications











 Medication  Instructions  Recorded  Confirmed  Type


 


LORazepam [Ativan] 0.5 mg PO Q8H PRN 19 History








                                    Allergies











Allergy/AdvReac Type Severity Reaction Status Date / Time


 


Beef Containing Products AdvReac  No reaction Verified 19 11:40





[Beef]     


 


Fish Containing Products AdvReac  No reaction Verified 19 11:40





[Fish]     


 


Pork/Porcine Containing AdvReac  No reaction Verified 19 11:40





Products     





[Pork]     














Physical Exam


Vitals: 


                                   Vital Signs











  Temp Pulse Resp BP Pulse Ox


 


 19 15:10   112 H  53 H  97/79  88 L


 


 19 15:00   118 H  37 H  107/79  91 L


 


 19 14:50   114 H  55 H  107/79  94 L


 


 19 14:40   115 H  52 H  107/79  93 L


 


 19 14:30   118 H  57 H  104/76  95


 


 19 14:20     104/76 


 


 19 14:10   117 H  40 H  104/76  91 L


 


 19 14:00    46 H  104/76  95


 


 19 13:50    44 H  108/79  97


 


 19 13:40   117 H  43 H  108/79  96


 


 19 13:30   117 H  29 H  108/81  96


 


 19 13:20   116 H  52 H  108/81  98


 


 19 13:10   101 H  51 H  108/81  96


 


 19 13:06   114 H   


 


 19 13:00   113 H  38 H  108/81  93 L


 


 19 12:53   113 H   


 


 19 12:50   114 H  50 H  108/85  91 L


 


 19 12:40   114 H  55 H  108/85  93 L


 


 19 12:30   115 H  41 H  115/87  94 L


 


 19 12:20   114 H  28 H  115/87  93 L


 


 19 12:10   114 H  36 H  115/87  94 L


 


 19 12:08    16  


 


 19 12:00   114 H  21  116/83  95


 


 19 11:52   113 H  51 H  109/81  97


 


 19 11:20  97.3 F L  113 H  24   94 L








                                Intake and Output











 19





 06:59 14:59 22:59


 


Other:   


 


  Weight  34.156 kg 














Constitutional: Mild respiratory distress, awake and alert cachectic appearance





Eyes: Anicteric sclerae, moist conjunctiva, no lid-lag, PERRLA





ENMT: Bilateral temporal wasting, 





 NC/AT,Oropharynx clear, no erythema, exudates





Neck:Supple, FROM, no masses, or JVD, No carotid bruits; No thyromegaly





Lungs: Decreased breath sounds bilaterally, tacypneic 





Cardiovascular: Heart regular in rate and rhythm,  No murmurs, gallops, or rubs 

no peripheral edema





Abdominal: Soft Nontender, nom distended, no guarding, no rebound or  rigidity, 

Normoactive bowel sounds No hepatomegaly, No splenomegaly,  No palpable mass No 

abdominal wall hernia noted 





Skin: Normal temperature, tone, texture, turgor, No induration No subcutaneous 

nodules, No rash, lesions, No ulcers





Extremities:No digital cyanosis No clubbing, Pedal pulses intact and  

symmetrical Radial pulses intact and symmetrical Normal gait and station, No 

calf tenderness   


      


Neuro: Awake alert following commands no focal deficits appreciated








Results


CBC & Chem 7: 


                                 19 12:40





                                 19 12:40


Labs: 


                  Abnormal Lab Results - Last 24 Hours (Table)











  19 Range/Units





  12:40 12:40 


 


WBC  14.0 H   (3.8-10.6)  k/uL


 


RBC  3.57 L   (3.80-5.40)  m/uL


 


Hgb  11.0 L   (11.4-16.0)  gm/dL


 


Plt Count  529 H   (150-450)  k/uL


 


Neutrophils #  13.5 H   (1.3-7.7)  k/uL


 


Lymphocytes #  0.1 L   (1.0-4.8)  k/uL


 


Carbon Dioxide   37 H  (22-30)  mmol/L


 


BUN   21 H  (7-17)  mg/dL


 


Creatinine   0.24 L  (0.52-1.04)  mg/dL


 


Alkaline Phosphatase   127 H  ()  U/L


 


Albumin   3.3 L  (3.5-5.0)  g/dL














Thrombosis Risk Factor Assmnt





- Choose All That Apply


Any of the Below Risk Factors Present?: Yes


Each Factor Represents 1 point: Age 41-60 years


Other Risk Factors: Yes


Each Risk Factor Represents 2 Points: Malignancy


Other congenital or acquired thrombophilia - If yes, enter type in comment: No


Thrombosis Risk Factor Assessment Total Risk Factor Score: 3


Thrombosis Risk Factor Assessment Level: Moderate Risk





Assessment and Plan


(1) Acute respiratory failure with hypoxia and hypercapnia


Current Visit: Yes   Status: Resolved   Code(s): J96.01 - ACUTE RESPIRATORY 

FAILURE WITH HYPOXIA; J96.02 - ACUTE RESPIRATORY FAILURE WITH HYPERCAPNIA   SNOM

ED Code(s): 347004893


   





(2) Mediastinal lymphadenopathy


Current Visit: Yes   Status: Acute   Code(s): R59.0 - LOCALIZED ENLARGED LYMPH 

NODES   SNOMED Code(s): 60188193


   





(3) Severe protein-energy malnutrition


Current Visit: Yes   Status: Acute   Code(s): E43 - UNSPECIFIED SEVERE PROTEIN-

CALORIE MALNUTRITION   SNOMED Code(s): 866234740


   





(4) Weakness


Current Visit: Yes   Status: Acute   Code(s): R53.1 - WEAKNESS   SNOMED Code(s):

 41152921


   





(5) Cachexia


Current Visit: Yes   Status: Acute   Code(s): R64 - CACHEXIA   SNOMED Code(s): 

307194288


   





(6) Leukocytosis


Current Visit: Yes   Status: Acute   Code(s): D72.829 - ELEVATED WHITE BLOOD 

CELL COUNT, UNSPECIFIED   SNOMED Code(s): 573586867


   





(7) Dysphagia


Current Visit: Yes   Status: Acute   Code(s): R13.10 - DYSPHAGIA, UNSPECIFIED   

SNOMED Code(s): 74982312


   





(8) History of laryngeal cancer


Current Visit: No   Status: Chronic   Code(s): Z85.21 - PERSONAL HISTORY OF 

MALIGNANT NEOPLASM OF LARYNX   SNOMED Code(s): 241255637


   





(9) History of aspiration pneumonia


Current Visit: Yes   Status: Acute   Code(s): Z87.01 - PERSONAL HISTORY OF 

PNEUMONIA (RECURRENT)   SNOMED Code(s): 697528823722970


   


Plan: 





The patient is admitted anticipated greater than 2 midnight stay with acute 

respiratory failure, etiology unknown at this time.  The patient having episodes

 of desaturations accompanied with hyperventilation, possible underlying Panic 

attack.  The patient continues to be to Clinic for saturations have improved, 

currently on 4 L via nasal cannula.  Chest x-ray showing probable atelectasis 

and associated effusion, patient afebrile mild leukocytosis of 14, hold off on 

antibiotics at this time.  We'll plan to consult pulmonary for further 

recommendations.  Patient with noted severe protein energy malnutrition with 

cachexia secondary to ongoing dysphagia and failed MBSS, we'll continue dietary 

recommendations with vital 1.2 to a goal 40 mL an hour With 30 mL free water 

flushes every 4 hours.  I will continue to follow patient clinical course and 

follow-up recommendations from pulmonary.








CODE STATUS: Full code


Discussed plan of care with: Patient's brother and patient


Next of kin: Carroll Chang


Anticipated discharge : 1-2 days


Anticipated discharge place: Skilled nursing facility


Prophylaxis Protonix and SCDs

## 2019-09-26 NOTE — XR
EXAMINATION TYPE: XR chest 2V

 

DATE OF EXAM: 9/26/2019

 

COMPARISON: Prior chest x-ray 9/21/2019

 

HISTORY: Difficulty breathing

 

TECHNIQUE:  Frontal and lateral views of the chest are obtained.

 

FINDINGS:  Port-A-Cath on the right, left-sided PICC line are stable. There is obscured right heart b
order and right diaphragm due to increased density at the lung base. Persistent basilar density noted
 on the left. No evident pneumothorax. Apical pleural thickening present on the right. The cardiac si
lhouette size is obscured.   The osseous structures are intact. Prominent lung volumes compatible wit
h underlying COPD.

 

IMPRESSION:  Probable basilar atelectasis and associated effusion. Findings similar to prior exam. Di
fficult to exclude underlying tumor. Apical pleural thickening. Additional follow-up recommended.

## 2019-09-27 LAB
ANION GAP SERPL CALC-SCNC: 5 MMOL/L
BASOPHILS # BLD AUTO: 0 K/UL (ref 0–0.2)
BASOPHILS NFR BLD AUTO: 0 %
BUN SERPL-SCNC: 26 MG/DL (ref 7–17)
CALCIUM SPEC-MCNC: 9 MG/DL (ref 8.4–10.2)
CHLORIDE SERPL-SCNC: 106 MMOL/L (ref 98–107)
CO2 BLDA-SCNC: 38 MMOL/L (ref 19–24)
CO2 BLDA-SCNC: 42 MMOL/L (ref 19–24)
CO2 BLDA-SCNC: 43 MMOL/L (ref 19–24)
CO2 SERPL-SCNC: 33 MMOL/L (ref 22–30)
EOSINOPHIL # BLD AUTO: 0 K/UL (ref 0–0.7)
EOSINOPHIL NFR BLD AUTO: 0 %
ERYTHROCYTE [DISTWIDTH] IN BLOOD BY AUTOMATED COUNT: 3.07 M/UL (ref 3.8–5.4)
ERYTHROCYTE [DISTWIDTH] IN BLOOD: 14.2 % (ref 11.5–15.5)
GLUCOSE BLD-MCNC: 108 MG/DL (ref 75–99)
GLUCOSE BLD-MCNC: 108 MG/DL (ref 75–99)
GLUCOSE BLD-MCNC: 110 MG/DL (ref 75–99)
GLUCOSE BLD-MCNC: 125 MG/DL (ref 75–99)
GLUCOSE BLD-MCNC: 141 MG/DL (ref 75–99)
GLUCOSE SERPL-MCNC: 102 MG/DL (ref 74–99)
HCO3 BLDA-SCNC: 36 MMOL/L (ref 21–25)
HCO3 BLDA-SCNC: 39 MMOL/L (ref 21–25)
HCO3 BLDA-SCNC: 41 MMOL/L (ref 21–25)
HCT VFR BLD AUTO: 30.8 % (ref 34–46)
HGB BLD-MCNC: 9.7 GM/DL (ref 11.4–16)
HYALINE CASTS UR QL AUTO: 9 /LPF (ref 0–2)
LYMPHOCYTES # SPEC AUTO: 0.1 K/UL (ref 1–4.8)
LYMPHOCYTES NFR SPEC AUTO: 1 %
MAGNESIUM SPEC-SCNC: 1.9 MG/DL (ref 1.6–2.3)
MCH RBC QN AUTO: 31.6 PG (ref 25–35)
MCHC RBC AUTO-ENTMCNC: 31.5 G/DL (ref 31–37)
MCV RBC AUTO: 100.3 FL (ref 80–100)
MONOCYTES # BLD AUTO: 0.7 K/UL (ref 0–1)
MONOCYTES NFR BLD AUTO: 5 %
NEUTROPHILS # BLD AUTO: 14.6 K/UL (ref 1.3–7.7)
NEUTROPHILS NFR BLD AUTO: 94 %
PCO2 BLDA: 59 MMHG (ref 35–45)
PCO2 BLDA: 69 MMHG (ref 35–45)
PCO2 BLDA: 83 MMHG (ref 35–45)
PH BLDA: 7.28 [PH] (ref 7.35–7.45)
PH BLDA: 7.38 [PH] (ref 7.35–7.45)
PH BLDA: 7.39 [PH] (ref 7.35–7.45)
PH UR: 6 [PH] (ref 5–8)
PLATELET # BLD AUTO: 531 K/UL (ref 150–450)
PO2 BLDA: 60 MMHG (ref 83–108)
PO2 BLDA: 62 MMHG (ref 83–108)
PO2 BLDA: 72 MMHG (ref 83–108)
POTASSIUM SERPL-SCNC: 4.1 MMOL/L (ref 3.5–5.1)
RBC UR QL: 11 /HPF (ref 0–5)
SODIUM SERPL-SCNC: 144 MMOL/L (ref 137–145)
SP GR UR: 1.02 (ref 1–1.03)
SQUAMOUS UR QL AUTO: 2 /HPF (ref 0–4)
UROBILINOGEN UR QL STRIP: <2 MG/DL (ref ?–2)
WBC # BLD AUTO: 15.5 K/UL (ref 3.8–10.6)

## 2019-09-27 PROCEDURE — 5A09457 ASSISTANCE WITH RESPIRATORY VENTILATION, 24-96 CONSECUTIVE HOURS, CONTINUOUS POSITIVE AIRWAY PRESSURE: ICD-10-PCS

## 2019-09-27 RX ADMIN — CEFAZOLIN SCH: 330 INJECTION, POWDER, FOR SOLUTION INTRAMUSCULAR; INTRAVENOUS at 02:44

## 2019-09-27 RX ADMIN — IPRATROPIUM BROMIDE AND ALBUTEROL SULFATE SCH ML: .5; 3 SOLUTION RESPIRATORY (INHALATION) at 12:45

## 2019-09-27 RX ADMIN — MORPHINE SULFATE PRN MG: 4 INJECTION, SOLUTION INTRAMUSCULAR; INTRAVENOUS at 17:32

## 2019-09-27 RX ADMIN — MORPHINE SULFATE PRN MG: 4 INJECTION, SOLUTION INTRAMUSCULAR; INTRAVENOUS at 04:53

## 2019-09-27 RX ADMIN — INSULIN ASPART SCH: 100 INJECTION, SOLUTION INTRAVENOUS; SUBCUTANEOUS at 17:13

## 2019-09-27 RX ADMIN — ENOXAPARIN SODIUM SCH MG: 40 INJECTION SUBCUTANEOUS at 09:43

## 2019-09-27 RX ADMIN — IPRATROPIUM BROMIDE AND ALBUTEROL SULFATE SCH ML: .5; 3 SOLUTION RESPIRATORY (INHALATION) at 19:36

## 2019-09-27 RX ADMIN — INSULIN ASPART SCH: 100 INJECTION, SOLUTION INTRAVENOUS; SUBCUTANEOUS at 12:02

## 2019-09-27 RX ADMIN — METHYLPREDNISOLONE SODIUM SUCCINATE SCH MG: 125 INJECTION, POWDER, FOR SOLUTION INTRAMUSCULAR; INTRAVENOUS at 17:13

## 2019-09-27 RX ADMIN — MORPHINE SULFATE PRN MG: 4 INJECTION, SOLUTION INTRAMUSCULAR; INTRAVENOUS at 00:50

## 2019-09-27 RX ADMIN — INSULIN ASPART SCH UNIT: 100 INJECTION, SOLUTION INTRAVENOUS; SUBCUTANEOUS at 00:50

## 2019-09-27 RX ADMIN — IPRATROPIUM BROMIDE AND ALBUTEROL SULFATE SCH ML: .5; 3 SOLUTION RESPIRATORY (INHALATION) at 07:26

## 2019-09-27 RX ADMIN — IPRATROPIUM BROMIDE AND ALBUTEROL SULFATE SCH ML: .5; 3 SOLUTION RESPIRATORY (INHALATION) at 16:10

## 2019-09-27 RX ADMIN — CEFAZOLIN SCH MLS/HR: 330 INJECTION, POWDER, FOR SOLUTION INTRAMUSCULAR; INTRAVENOUS at 10:34

## 2019-09-27 RX ADMIN — INSULIN ASPART SCH: 100 INJECTION, SOLUTION INTRAVENOUS; SUBCUTANEOUS at 06:14

## 2019-09-27 NOTE — P.CNPUL
History of Present Illness


Consult date: 19


Requesting physician: Gentry Sky


Reason for consult: dyspnea


Chief complaint: Shortness of breath


History of present illness: 


 This is a 54-year-old female patient of Dr. Jerome, who was recently 

hospitalized for acute respiratory failure secondary to aspiration pneumonia, 

hypoxemic and hypercapnic requiring intubation and placement on mechanical 

ventilation.  Patient was successfully extubated, was treated with antibiotics, 

completed a course of Levaquin and Zosyn.  Patient showed significant clinical 

and radiographic improvement, by the time of her discharge to the [facility].  Patient 

also has history of laryngeal cancer with previous chemoradiation, and had 

evidence of severe malnutrition.  In addition patient failed modified barium 

swallow, and was having evidence of aspiration with all consistencies.  Patient 

also had evidence of electrolyte abnormality with hyponatremia and hypokalemia, 

which improved with treatment.  Patient had a gastrostomy tube placed for 

nutritional support, and she was strict NPO.  Sputum cultures from previous 

admission were negative.  Other medical history includes chronic anemia of 

chronic disease, previous tobacco dependence.  Patient was discharged to the 

[facility] on 2019 however she started experiencing shortness 

of breath at the [facility], and was apparently hypoxic and was brought back for 

evaluation on oral 2019.  He denied any chest pain, she denies any oral 

intake at the nursing home home, she states she was getting her nutrition 

strictly through the gastrostomy tube.  No aspiration, no fever or chills, chest

x-ray showed basilar atelectasis and associated pleural effusions.  Lab work 

showed white blood cell count of 14.0, hemoglobin of 11, correlation profile was

within normal limits, blood gas showed pO2 of 64, pCO2 of 67, and pH of 7.32.  

Sodium was 140, potassium is 4.0, chloride is 98, CO2 37, B1 is 21, creatinine 

0.24.  O2 sat in the emergency department was ranging from 84-86 patient was 

placed on supplemental oxygen.  Patient was given IV steroids, nebulized 

bronchodilators, and worsening the patient in consultation for shortness of 

breath.  She is awake and alert, she is sitting up in bed, appears to be in no 

acute distress, currently on 2 L of oxygen, no significant wheezing, or 

congestion, she does have a weak cough, which does not sound congested, lung 

sounds are diminished, no rales auscultated.  Follow-up blood work was reviewed 

today, white blood cell count is 15.5, hemoglobin is 9.7, repeat blood gas 

showed pO2 of 60, pCO2 59, and pH of 7.39 this was done on FiO2 28%.  Patient l

ooks extremely cachectic and weak, her voice is very weak and hoarse, but she 

appears to be in no acute distress.





Review of Systems


All systems: negative


Constitutional: Denies chills, Denies fever


Eyes: denies blurred vision, denies pain


Ears, nose, mouth and throat: Denies headache, Denies sore throat


Cardiovascular: Denies chest pain, Denies shortness of breath


Respiratory: Reports dyspnea, Denies cough


Gastrointestinal: Denies abdominal pain, Denies diarrhea, Denies nausea, Denies 

vomiting


Genitourinary: Denies dysuria, Denies hematuria


Musculoskeletal: Denies myalgias


Integumentary: Denies pruritus, Denies rash


Neurological: Denies numbness, Denies weakness


Psychiatric: Denies anxiety, Denies depression


Endocrine: Denies fatigue, Denies weight change





Past Medical History


Past Medical History: Cancer


Additional Past Medical History / Comment(s): Pt recently admitted to [facility] on  with severe protein calorie malnourishment, acute respiratory failure 

with hypoxia and hypercapnia was intubated/vented, also had mediatinal 

lymphadenopathy, nyponatremia and hypokalemia.    Other Hx:  Laryngeal cancer 

treated with chemo/radiation 2019, dysphagia-NPO/has G tube.


History of Any Multi-Drug Resistant Organisms: None Reported


Past Surgical History: Tubal Ligation


Additional Past Surgical History / Comment(s): Open G tube placement, mediport, 

picc line, laryngoscopy, one fallopian tube removed d/t ectopic pregnancy.


Past Anesthesia/Blood Transfusion Reactions: No Reported Reaction


Past Psychological History: Anxiety


Additional Psychological History / Comment(s): Pt just placed in [facility] on 19.  She is up with a walker.  She is NPO and has a G tube for 

feedings.


Smoking Status: Former smoker


Past Alcohol Use History: None Reported


Additional Past Alcohol Use History / Comment(s): Pt started smoking in  and

quit 5 months ago-2019.


Past Drug Use History: None Reported





- Past Family History


  ** Mother


Family Medical History: Coronary Artery Disease (CAD), Diabetes Mellitus





  ** Father


Family Medical History: COPD, CVA/TIA, Myocardial Infarction (MI)


Additional Family Medical History / Comment(s): Father  of a MI at the age 

of 54 yrs.





Medications and Allergies


                                Home Medications











 Medication  Instructions  Recorded  Confirmed  Type


 


LORazepam [Ativan] 0.5 mg PO Q8H PRN 19 History








                                    Allergies











Allergy/AdvReac Type Severity Reaction Status Date / Time


 


Beef Containing Products AdvReac  No reaction Verified 19 11:40





[Beef]     


 


Fish Containing Products AdvReac  No reaction Verified 19 11:40





[Fish]     


 


Pork/Porcine Containing AdvReac  No reaction Verified 19 11:40





Products     





[Pork]     














Physical Exam


Vitals: 


                                   Vital Signs











  Temp Pulse Pulse Pulse Resp BP BP


 


 19 11:54  97.9 F   107 H   24   106/71


 


 19 08:10    109 H   22  


 


 19 08:00  97.4 F L    109 H  22   107/72


 


 19 07:38   118 H     


 


 19 07:28   108 H     


 


 19 04:00  98 F    105 H  20   124/56


 


 19 00:13     109 H  42 H   96/68


 


 19 20:56  97.2 F L    121 H  20   105/67


 


 19 20:05   112 H     


 


 19 19:57   120 H     


 


 19 18:36  97.3 F L  102 H    30 H  95/70 


 


 19 17:55   102 H    30 H  95/70 


 


 19 17:30   102 H    30 H  95/70 


 


 19 17:25   96     


 


 19 17:18   99     


 


 19 17:00   101 H    48 H  103/73 


 


 19 16:30   102 H    41 H  103/73 


 


 19 16:00   106 H    41 H  100/70 


 


 19 15:30   106 H    54 H  97/79 


 


 19 15:10   112 H    53 H  97/79 


 


 19 15:00   118 H    37 H  107/79 


 


 19 14:50   114 H    55 H  107/79 


 


 19 14:40   115 H    52 H  107/79 


 


 19 14:30   118 H    57 H  104/76 


 


 19 14:20       104/76 


 


 19 14:10   117 H    40 H  104/76 


 


 19 14:00      46 H  104/76 


 


 19 13:50      44 H  108/79 


 


 19 13:40   117 H    43 H  108/79 


 


 19 13:30   117 H    29 H  108/81 


 


 19 13:20   116 H    52 H  108/81 


 


 19 13:10   101 H    51 H  108/81 


 


 19 13:06   114 H     


 


 19 13:00   113 H    38 H  108/81 


 


 19 12:53   113 H     


 


 19 12:50   114 H    50 H  108/85 


 


 19 12:40   114 H    55 H  108/85 














  Pulse Ox


 


 19 11:54  90 L


 


 19 08:10 


 


 19 08:00  91 L


 


 19 07:38 


 


 19 07:28 


 


 19 04:00  92 L


 


 19 00:13  90 L


 


 19 20:56  84 L


 


 19 20:05 


 


 19 19:57 


 


 19 18:36  96


 


 19 17:55  96


 


 19 17:30  96


 


 19 17:25 


 


 19 17:18 


 


 19 17:00 


 


 19 16:30  99


 


 19 16:00  95


 


 19 15:30  86 L


 


 19 15:10  88 L


 


 19 15:00  91 L


 


 19 14:50  94 L


 


 19 14:40  93 L


 


 19 14:30  95


 


 19 14:20 


 


 19 14:10  91 L


 


 19 14:00  95


 


 19 13:50  97


 


 19 13:40  96


 


 19 13:30  96


 


 19 13:20  98


 


 19 13:10  96


 


 19 13:06 


 


 19 13:00  93 L


 


 19 12:53 


 


 09/26/19 12:50  91 L


 


 19 12:40  93 L








                                Intake and Output











 19





 22:59 06:59 14:59


 


Intake Total 2350 320 


 


Balance 2350 320 


 


Intake:   


 


  Amount of Fluid Infused ( 2000  





  ml)   


 


  Intake, IV Titration 350  





  Amount   


 


    Sodium Chloride 0.9% 1, 350  





    000 ml @ 100 mls/hr IV .   





    Q10H [facility] Rx#:[id number]   


 


  Tube Feeding  320 


 


Other:   


 


  Voiding Method Diaper Diaper Diaper


 


  # Voids  1 


 


  Weight  25 kg 34.019 kg











 GENERAL EXAM: Alert, very pleasant, cachectic 54-year-old white female, 2 L of 

oxygen and the pulse ox of 90-91%, comfortable in no apparent distress.


HEAD: Normocephalic/atraumatic.


EYES: Normal reaction of pupils, equal size.  Conjunctiva pink, sclera white.


NOSE: Clear with pink turbinates.


THROAT: No erythema or exudates.


NECK: No masses, no JVD, no thyroid enlargement, no adenopathy.


CHEST: No chest wall deformity.  Symmetrical expansion. 


LUNGS: Equal air entry with no crackles, wheeze, rhonchi or dullness.


CVS: Regular rate and rhythm, normal S1 and S2, no gallops, no murmurs, no rubs


ABDOMEN: Soft, nontender.  No hepatosplenomegaly, normal bowel sounds, no gua

rding or rigidity.  Gastric tube insertion site is clean dry and intact, gastric

 tube is covered with the dressing


EXTREMITIES: No clubbing, no edema, no cyanosis, 2+ pulses and upper and lower 

extremities.


MUSCULOSKELETAL: Muscle strength and tone normal.


SPINE: No scoliosis or deformity


SKIN: No rashes


CENTRAL NERVOUS SYSTEM: Alert and oriented -3.  No focal deficits, tone is 

normal in all 4 extremities.


PSYCHIATRIC: Alert and oriented -3.  Appropriate affect.  Intact judgment and 

insight.








Results





- Laboratory Findings


CBC and BMP: 


                                 19 11:24





                                 19 12:40


ABG











ABG pH  7.39  (7.35-7.45)   19  06:17    


 


ABG pCO2  59 mmHg (35-45)  H  19  06:17    


 


ABG pO2  60 mmHg ()  L  19  06:17    


 


ABG O2 Saturation  90.0 % (94-97)  L  19  06:17    





PT/INR, D-dimer











PT  11.7 sec (9.0-12.0)   19  12:40    


 


INR  1.1  (<1.2)   19  12:40    








Abnormal lab findings: 


                                  Abnormal Labs











  19





  12:40 12:40 21:43


 


WBC  14.0 H  


 


RBC  3.57 L  


 


Hgb  11.0 L  


 


Hct   


 


MCV   


 


Plt Count  529 H  


 


Neutrophils #  13.5 H  


 


Lymphocytes #  0.1 L  


 


ABG pH   


 


ABG pCO2   


 


ABG pO2   


 


ABG HCO3   


 


ABG Total CO2   


 


ABG O2 Saturation   


 


Carbon Dioxide   37 H 


 


BUN   21 H 


 


Creatinine   0.24 L 


 


POC Glucose (mg/dL)    144 H


 


Alkaline Phosphatase   127 H 


 


Albumin   3.3 L 














  19





  22:09 00:43 06:03


 


WBC   


 


RBC   


 


Hgb   


 


Hct   


 


MCV   


 


Plt Count   


 


Neutrophils #   


 


Lymphocytes #   


 


ABG pH  7.32 L  


 


ABG pCO2  67 H  


 


ABG pO2  64 L  


 


ABG HCO3  34 H  


 


ABG Total CO2  36 H  


 


ABG O2 Saturation  89.7 L  


 


Carbon Dioxide   


 


BUN   


 


Creatinine   


 


POC Glucose (mg/dL)   141 H  108 H


 


Alkaline Phosphatase   


 


Albumin   














  19





  06:17 11:24 11:55


 


WBC   15.5 H 


 


RBC   3.07 L 


 


Hgb   9.7 L 


 


Hct   30.8 L 


 


MCV   100.3 H 


 


Plt Count   531 H 


 


Neutrophils #   14.6 H 


 


Lymphocytes #   0.1 L 


 


ABG pH   


 


ABG pCO2  59 H  


 


ABG pO2  60 L  


 


ABG HCO3  36 H  


 


ABG Total CO2  38 H  


 


ABG O2 Saturation  90.0 L  


 


Carbon Dioxide   


 


BUN   


 


Creatinine   


 


POC Glucose (mg/dL)    108 H


 


Alkaline Phosphatase   


 


Albumin   














- Diagnostic Findings


Chest x-ray: report reviewed, image reviewed


CT scan - chest: report reviewed, image reviewed





Assessment and Plan


Plan: 


 Assessment:





#1.  Dyspnea, possibly related to bilateral pleural effusions and possibility of

 right middle lobe pneumonia versus atelectasis





#2.  Acute on chronic hypercapnic respiratory failure and hypoxemic respiratory 

failure related to the above





#3.  Recent hospitalization for acute aspiration pneumonia requiring intubation 

and mechanical ventilator support, patient was treated with Levaquin and Zosyn





#4.  History of laryngeal carcinoma, status post chemo and radiation therapy 

would last treatment in 2019 and patient was treated at [facility]





#5.  Severe protein calorie malnutrition





#6.  Anorexia/cachexia syndrome





#7.  History of previous tobacco dependence





#8.  History of anemia of chronic disease





#9.  Dysphagia status post gastrostomy tube placement for aspiration with 

various types of food consistencies as evidenced on the MBS





#10.  Pulmonary lymphadenopathy as previously noted on previous CT angios of the

 chest possibly related to prior history of laryngeal cancer





Plan:





We'll obtain a pro-calcitonin level, CT chest has been reviewed showing 

increased pleural effusions, and associated basilar atelectasis, possibility of 

right middle lobe pneumonia versus atelectasis.  Clinically patient looks 

nontoxic, however very weak and cachectic.  Afebrile, any skin wheezing or 

congestion.  Hold off on antibiotics, continue with IV Solu-Medrol, breathing 

treatments.  Resume nutritional support, resume tube feedings, consult dietary 

service.  Will await the results of the pro-calcitonin.  We'll follow





I performed a history & physical examination of the patient and discussed their 

management with my nurse practitioner, Romina Brown.  I reviewed the nurse 

practitioner's note and agree with the documented findings and plan of care.  

Lung sounds are positive for diminished breath sounds.  The findings and the 

impression was discussed with the patient.  I attest to the documentation by the

 nurse practitioner. 


Time with Patient: Greater than 30

## 2019-09-27 NOTE — CT
EXAMINATION TYPE: CT chest wo con

 

DATE OF EXAM: 9/27/2019

 

COMPARISON: Prior chest CT 9/12/2019

 

HISTORY: Dyspnea.

 

CT DLP: 154.7 mGycm.  Automated Exposure Control for Dose Reduction was Utilized.

 

TECHNIQUE:  CT scan of the thorax is performed without IV contrast.

 

FINDINGS: Lack of intravenous contrast could compromise sensitivity.

 

LUNGS: The emphysematous changes within the lungs are again noted. There is right middle lobe airspac
e disease, air bronchograms are present, bilateral lower lobe atelectatic changes with associated ple
ural effusions noted. There is some fluid density at the right lung apex, biapical pleural thickening
, probable scarring again noted. The abnormal soft tissue density present at the left upper lobe seen
 on prior exam has essentially resolved. Improved aeration is present in the bilateral upper lobes.

 

MEDIASTINUM: Lack of IV contrast is noted to limit evaluation for mediastinal and especially hilar ad
enopathy. There are no definitive greater than 1 cm hilar or mediastinal lymph nodes.   No cardiomega
ly or pericardial effusion is seen. 

 

OTHER: A catheter present on the right shows the distal tip in the right atrium. Left-sided PICC line
 shows the distal tip of the catheter at the level of the cavoatrial junction. There is a tube presen
t in the stomach. Punctate calcifications present within the spleen.

 

IMPRESSION: There are increased pleural effusions and associated basilar atelectasis, correlate for r
ight middle lobe pneumonia versus atelectasis. Emphysema. Interval improved aeration in the upper lob
es. Noncontrast exam.

## 2019-09-27 NOTE — P.PN
Subjective


Progress Note Date: 09/27/19





Patient's an exam and follow up today, currently on 2 L via nasal cannula.  

Having episodes of tachypnea shallow breathing and desaturations yesterday.  ABG

suggesting hypoxia and hypercapnia.  Patient continues on tube feeds going at 40

mL an hour with free water flushes. 





Objective





- Vital Signs


Vital signs: 


                                   Vital Signs











Temp  97.4 F L  09/27/19 08:00


 


Pulse  109 H  09/27/19 08:10


 


Resp  22   09/27/19 08:10


 


BP  107/72   09/27/19 08:00


 


Pulse Ox  91 L  09/27/19 08:00








                                 Intake & Output











 09/26/19 09/27/19 09/27/19





 18:59 06:59 18:59


 


Intake Total 2000 670 


 


Balance 2000 670 


 


Weight 34.156 kg 25 kg 


 


Intake:   


 


  Amount of Fluid Infused ( 2000  





  ml)   


 


  Intake, IV Titration  350 





  Amount   


 


    Sodium Chloride 0.9% 1,  350 





    000 ml @ 100 mls/hr IV .   





    Q10H [facility] Rx#:[id number]   


 


  Tube Feeding  320 


 


Other:   


 


  Voiding Method  Diaper Incontinent


 


  # Voids  1 














- Exam





Constitutional: No acute distress, awake and alert cachectic appearance





Eyes: Anicteric sclerae, moist conjunctiva, no lid-lag, PERRLA





ENMT: Bilateral temporal wasting, 





 NC/AT,Oropharynx clear, no erythema, exudates





Neck:Supple, FROM, no masses, or JVD, No carotid bruits; No thyromegaly





Lungs: Decreased breath sounds bilaterally, breathing unlabored on 2 L nasal 

cannula





Cardiovascular: Heart regular in rate and rhythm,  No murmurs, gallops, or rubs 

no peripheral edema





Abdominal: Soft Nontender, nom distended, no guarding, no rebound or  rigidity, 

Normoactive bowel sounds No hepatomegaly, No splenomegaly,  No palpable mass No 

abdominal wall hernia noted 





Skin: Normal temperature, tone, texture, turgor, No induration No subcutaneous 

nodules, No rash, lesions, No ulcers





Extremities:No digital cyanosis No clubbing, Pedal pulses intact and  

symmetrical Radial pulses intact and symmetrical Normal gait and station, No 

calf tenderness   


      


Neuro: Awake alert following commands no focal deficits appreciated








- Labs


CBC & Chem 7: 


                                 09/28/19 05:36





                                 09/28/19 05:36


Labs: 


                  Abnormal Lab Results - Last 24 Hours (Table)











  09/26/19 09/26/19 09/26/19 Range/Units





  12:40 12:40 21:43 


 


WBC  14.0 H    (3.8-10.6)  k/uL


 


RBC  3.57 L    (3.80-5.40)  m/uL


 


Hgb  11.0 L    (11.4-16.0)  gm/dL


 


Plt Count  529 H    (150-450)  k/uL


 


Neutrophils #  13.5 H    (1.3-7.7)  k/uL


 


Lymphocytes #  0.1 L    (1.0-4.8)  k/uL


 


ABG pH     (7.35-7.45)  


 


ABG pCO2     (35-45)  mmHg


 


ABG pO2     ()  mmHg


 


ABG HCO3     (21-25)  mmol/L


 


ABG Total CO2     (19-24)  mmol/L


 


ABG O2 Saturation     (94-97)  %


 


Carbon Dioxide   37 H   (22-30)  mmol/L


 


BUN   21 H   (7-17)  mg/dL


 


Creatinine   0.24 L   (0.52-1.04)  mg/dL


 


POC Glucose (mg/dL)    144 H  (75-99)  mg/dL


 


Alkaline Phosphatase   127 H   ()  U/L


 


Albumin   3.3 L   (3.5-5.0)  g/dL














  09/26/19 09/27/19 09/27/19 Range/Units





  22:09 00:43 06:03 


 


WBC     (3.8-10.6)  k/uL


 


RBC     (3.80-5.40)  m/uL


 


Hgb     (11.4-16.0)  gm/dL


 


Plt Count     (150-450)  k/uL


 


Neutrophils #     (1.3-7.7)  k/uL


 


Lymphocytes #     (1.0-4.8)  k/uL


 


ABG pH  7.32 L    (7.35-7.45)  


 


ABG pCO2  67 H    (35-45)  mmHg


 


ABG pO2  64 L    ()  mmHg


 


ABG HCO3  34 H    (21-25)  mmol/L


 


ABG Total CO2  36 H    (19-24)  mmol/L


 


ABG O2 Saturation  89.7 L    (94-97)  %


 


Carbon Dioxide     (22-30)  mmol/L


 


BUN     (7-17)  mg/dL


 


Creatinine     (0.52-1.04)  mg/dL


 


POC Glucose (mg/dL)   141 H  108 H  (75-99)  mg/dL


 


Alkaline Phosphatase     ()  U/L


 


Albumin     (3.5-5.0)  g/dL














  09/27/19 Range/Units





  06:17 


 


WBC   (3.8-10.6)  k/uL


 


RBC   (3.80-5.40)  m/uL


 


Hgb   (11.4-16.0)  gm/dL


 


Plt Count   (150-450)  k/uL


 


Neutrophils #   (1.3-7.7)  k/uL


 


Lymphocytes #   (1.0-4.8)  k/uL


 


ABG pH   (7.35-7.45)  


 


ABG pCO2  59 H  (35-45)  mmHg


 


ABG pO2  60 L  ()  mmHg


 


ABG HCO3  36 H  (21-25)  mmol/L


 


ABG Total CO2  38 H  (19-24)  mmol/L


 


ABG O2 Saturation  90.0 L  (94-97)  %


 


Carbon Dioxide   (22-30)  mmol/L


 


BUN   (7-17)  mg/dL


 


Creatinine   (0.52-1.04)  mg/dL


 


POC Glucose (mg/dL)   (75-99)  mg/dL


 


Alkaline Phosphatase   ()  U/L


 


Albumin   (3.5-5.0)  g/dL














Assessment and Plan


(1) Acute respiratory failure with hypoxia and hypercapnia


Narrative/Plan: 





* Patient with episodes of respiratory distress with tachypnea desaturations


* Continue supplemental oxygen as needed


* Continue breathing treatments,we'll initiate systemic steroids


* ABG suggesting hypoxia and hypercapnia


* Awaiting pulmonary recommendations


Current Visit: Yes   Status: Resolved   Code(s): J96.01 - ACUTE RESPIRATORY 

FAILURE WITH HYPOXIA; J96.02 - ACUTE RESPIRATORY FAILURE WITH HYPERCAPNIA   

SNOMED Code(s): 991128241


   





(2) Severe protein-energy malnutrition


Narrative/Plan: 





* Continue tube feeds currently at goal


* On vital 1.2 and 40 mL an hour


Current Visit: Yes   Status: Acute   Code(s): E43 - UNSPECIFIED SEVERE PROTEIN-

CALORIE MALNUTRITION   SNOMED Code(s): 514489830


   





(3) Mediastinal lymphadenopathy


Current Visit: Yes   Status: Acute   Code(s): R59.0 - LOCALIZED ENLARGED LYMPH 

NODES   SNOMED Code(s): 66698681


   





(4) Weakness


Narrative/Plan: 





* PT consulted 


Current Visit: Yes   Status: Chronic   Code(s): R53.1 - WEAKNESS   SNOMED 

Code(s): 09850334


   





(5) Leukocytosis


Narrative/Plan: 





* Patient afebrile we'll recheck CBC today


* We'll check urinalysis and blood cultures


Current Visit: Yes   Status: Acute   Code(s): D72.829 - ELEVATED WHITE BLOOD 

CELL COUNT, UNSPECIFIED   SNOMED Code(s): 750068443


   





(6) Cachexia


Current Visit: Yes   Status: Acute   Code(s): R64 - CACHEXIA   SNOMED Code(s): 

597641959


   





(7) Dysphagia


Current Visit: Yes   Status: Acute   Code(s): R13.10 - DYSPHAGIA, UNSPECIFIED   

SNOMED Code(s): 50574270


   





(8) History of laryngeal cancer


Current Visit: No   Status: Chronic   Code(s): Z85.21 - PERSONAL HISTORY OF 

MALIGNANT NEOPLASM OF LARYNX   SNOMED Code(s): 160502620


   





(9) History of aspiration pneumonia


Current Visit: Yes   Status: Acute   Code(s): Z87.01 - PERSONAL HISTORY OF 

PNEUMONIA (RECURRENT)   SNOMED Code(s): 834821520126149


   


Plan: 








* Disposition follow-up pulmonary recommendations

## 2019-09-28 LAB
ANION GAP SERPL CALC-SCNC: -1 MMOL/L
BASOPHILS # BLD AUTO: 0 K/UL (ref 0–0.2)
BASOPHILS NFR BLD AUTO: 0 %
BUN SERPL-SCNC: 18 MG/DL (ref 7–17)
CALCIUM SPEC-MCNC: 8.5 MG/DL (ref 8.4–10.2)
CHLORIDE SERPL-SCNC: 105 MMOL/L (ref 98–107)
CO2 SERPL-SCNC: 38 MMOL/L (ref 22–30)
EOSINOPHIL # BLD AUTO: 0 K/UL (ref 0–0.7)
EOSINOPHIL NFR BLD AUTO: 0 %
ERYTHROCYTE [DISTWIDTH] IN BLOOD BY AUTOMATED COUNT: 2.67 M/UL (ref 3.8–5.4)
ERYTHROCYTE [DISTWIDTH] IN BLOOD: 14.7 % (ref 11.5–15.5)
GLUCOSE BLD-MCNC: 117 MG/DL (ref 75–99)
GLUCOSE BLD-MCNC: 126 MG/DL (ref 75–99)
GLUCOSE BLD-MCNC: 153 MG/DL (ref 75–99)
GLUCOSE SERPL-MCNC: 146 MG/DL (ref 74–99)
HCT VFR BLD AUTO: 27.2 % (ref 34–46)
HGB BLD-MCNC: 8.5 GM/DL (ref 11.4–16)
LYMPHOCYTES # SPEC AUTO: 0.1 K/UL (ref 1–4.8)
LYMPHOCYTES NFR SPEC AUTO: 1 %
MCH RBC QN AUTO: 31.6 PG (ref 25–35)
MCHC RBC AUTO-ENTMCNC: 31.1 G/DL (ref 31–37)
MCV RBC AUTO: 101.8 FL (ref 80–100)
MONOCYTES # BLD AUTO: 0.3 K/UL (ref 0–1)
MONOCYTES NFR BLD AUTO: 3 %
NEUTROPHILS # BLD AUTO: 10.1 K/UL (ref 1.3–7.7)
NEUTROPHILS NFR BLD AUTO: 96 %
PLATELET # BLD AUTO: 489 K/UL (ref 150–450)
POTASSIUM SERPL-SCNC: 3.4 MMOL/L (ref 3.5–5.1)
SODIUM SERPL-SCNC: 142 MMOL/L (ref 137–145)
WBC # BLD AUTO: 10.5 K/UL (ref 3.8–10.6)

## 2019-09-28 RX ADMIN — INSULIN ASPART SCH: 100 INJECTION, SOLUTION INTRAVENOUS; SUBCUTANEOUS at 00:10

## 2019-09-28 RX ADMIN — INSULIN ASPART SCH UNIT: 100 INJECTION, SOLUTION INTRAVENOUS; SUBCUTANEOUS at 06:15

## 2019-09-28 RX ADMIN — PANTOPRAZOLE SODIUM SCH MG: 40 INJECTION, POWDER, FOR SOLUTION INTRAVENOUS at 09:24

## 2019-09-28 RX ADMIN — PIPERACILLIN AND TAZOBACTAM SCH MLS/HR: 3; .375 INJECTION, POWDER, FOR SOLUTION INTRAVENOUS at 16:11

## 2019-09-28 RX ADMIN — MORPHINE SULFATE PRN MG: 4 INJECTION, SOLUTION INTRAMUSCULAR; INTRAVENOUS at 21:34

## 2019-09-28 RX ADMIN — ENOXAPARIN SODIUM SCH MG: 40 INJECTION SUBCUTANEOUS at 09:24

## 2019-09-28 RX ADMIN — IPRATROPIUM BROMIDE AND ALBUTEROL SULFATE SCH ML: .5; 3 SOLUTION RESPIRATORY (INHALATION) at 11:20

## 2019-09-28 RX ADMIN — METHYLPREDNISOLONE SODIUM SUCCINATE SCH MG: 125 INJECTION, POWDER, FOR SOLUTION INTRAMUSCULAR; INTRAVENOUS at 00:15

## 2019-09-28 RX ADMIN — IPRATROPIUM BROMIDE AND ALBUTEROL SULFATE SCH ML: .5; 3 SOLUTION RESPIRATORY (INHALATION) at 15:18

## 2019-09-28 RX ADMIN — POTASSIUM CHLORIDE SCH MLS/HR: 14.9 INJECTION, SOLUTION INTRAVENOUS at 09:24

## 2019-09-28 RX ADMIN — ISODIUM CHLORIDE PRN MG: 0.03 SOLUTION RESPIRATORY (INHALATION) at 23:01

## 2019-09-28 RX ADMIN — POTASSIUM CHLORIDE SCH MLS/HR: 14.9 INJECTION, SOLUTION INTRAVENOUS at 07:01

## 2019-09-28 RX ADMIN — MORPHINE SULFATE PRN MG: 4 INJECTION, SOLUTION INTRAMUSCULAR; INTRAVENOUS at 06:16

## 2019-09-28 RX ADMIN — PIPERACILLIN AND TAZOBACTAM SCH MLS/HR: 3; .375 INJECTION, POWDER, FOR SOLUTION INTRAVENOUS at 00:15

## 2019-09-28 RX ADMIN — MORPHINE SULFATE PRN MG: 4 INJECTION, SOLUTION INTRAMUSCULAR; INTRAVENOUS at 11:46

## 2019-09-28 RX ADMIN — IPRATROPIUM BROMIDE AND ALBUTEROL SULFATE SCH ML: .5; 3 SOLUTION RESPIRATORY (INHALATION) at 19:28

## 2019-09-28 RX ADMIN — METHYLPREDNISOLONE SODIUM SUCCINATE SCH MG: 125 INJECTION, POWDER, FOR SOLUTION INTRAMUSCULAR; INTRAVENOUS at 16:10

## 2019-09-28 RX ADMIN — METHYLPREDNISOLONE SODIUM SUCCINATE SCH MG: 125 INJECTION, POWDER, FOR SOLUTION INTRAMUSCULAR; INTRAVENOUS at 09:24

## 2019-09-28 RX ADMIN — INSULIN ASPART SCH: 100 INJECTION, SOLUTION INTRAVENOUS; SUBCUTANEOUS at 12:03

## 2019-09-28 RX ADMIN — INSULIN ASPART SCH: 100 INJECTION, SOLUTION INTRAVENOUS; SUBCUTANEOUS at 19:05

## 2019-09-28 RX ADMIN — PIPERACILLIN AND TAZOBACTAM SCH MLS/HR: 3; .375 INJECTION, POWDER, FOR SOLUTION INTRAVENOUS at 06:15

## 2019-09-28 RX ADMIN — IPRATROPIUM BROMIDE AND ALBUTEROL SULFATE SCH ML: .5; 3 SOLUTION RESPIRATORY (INHALATION) at 07:42

## 2019-09-28 NOTE — P.PN
Subjective


Progress Note Date: 09/28/19








 This is a 54-year-old female patient of Dr. Jerome, who was recently hospital

ized for acute respiratory failure secondary to aspiration pneumonia, hypoxemic 

and hypercapnic requiring intubation and placement on mechanical ventilation.  

Patient was successfully extubated, was treated with antibiotics, completed a 

course of Levaquin and Zosyn.  Patient showed significant clinical and 

radiographic improvement, by the time of her discharge to the [facility].  Patient also

has history of laryngeal cancer with previous chemoradiation, and had evidence 

of severe malnutrition.  In addition patient failed modified barium swallow, and

was having evidence of aspiration with all consistencies.  Patient also had 

evidence of electrolyte abnormality with hyponatremia and hypokalemia, which 

improved with treatment.  Patient had a gastrostomy tube placed for nutritional 

support, and she was strict NPO.  Sputum cultures from previous admission were 

negative.  Other medical history includes chronic anemia of chronic disease, 

previous tobacco dependence.  Patient was discharged to the [facility] on 09/25/2019 however she started experiencing shortness of breath at the 

[facility], and was apparently hypoxic and was brought back for evaluation on oral 

09/26/2019.  He denied any chest pain, she denies any oral intake at the nursing

home home, she states she was getting her nutrition strictly through the 

gastrostomy tube.  No aspiration, no fever or chills, chest x-ray showed basilar

atelectasis and associated pleural effusions.  Lab work showed white blood cell 

count of 14.0, hemoglobin of 11, correlation profile was within normal limits, 

blood gas showed pO2 of 64, pCO2 of 67, and pH of 7.32.  Sodium was 140, 

potassium is 4.0, chloride is 98, CO2 37, B1 is 21, creatinine 0.24.  O2 sat in 

the emergency department was ranging from 84-86 patient was placed on 

supplemental oxygen.  Patient was given IV steroids, nebulized bronchodilators, 

and worsening the patient in consultation for shortness of breath.  She is awake

and alert, she is sitting up in bed, appears to be in no acute distress, 

currently on 2 L of oxygen, no significant wheezing, or congestion, she does h

ave a weak cough, which does not sound congested, lung sounds are diminished, no

rales auscultated.  Follow-up blood work was reviewed today, white blood cell 

count is 15.5, hemoglobin is 9.7, repeat blood gas showed pO2 of 60, pCO2 59, 

and pH of 7.39 this was done on FiO2 28%.  Patient looks extremely cachectic and

weak, her voice is very weak and hoarse, but she appears to be in no acute 

distress.





On 09/28/2019 I'm seeing this patient for a follow-up.  This is an extremely 

debilitated and severely malnourished female patient who is post respiratory 

failure who was readmitted for ongoing difficulties in breathing.  Noted the 

patient's BMI is 11.9 and she has kwashiorkor.  The patient got moved yesterday 

to the intensive care unit because of worsening shortness of breath and altered 

mentation.  Note that on the floor the patient had a blood gas showing a pH of  

0.28 with a pCO2 of 83 and pO2 of 72.  This was on FiO2 of 32%.  She was having 

increased tachypnea and respiratory distress.  At that point the patient got 

transferred to the intensive care unit.  I was very hesitant intubated this 

patient based on an underlying condition and status.  Note that the patient is 

extremely frail and has absolutely no muscle mass and intubation may be quite 

devastating for this patient had the patient accordingly was placed on a BiPAP 

at a pressure of 10/5 cm of water.  She was also given some Ativan and morphine 

which.  Much calm that down.  Her subsequent blood gas showed a pH of 7.38 with 

a pCO2 of 69 a pO2 of 62.  I realized the patient's respiratory insufficiency he

had it performed a CAT scan of the chest yesterday and the CAT scan showed 

complete occlusion of the distal bronchus intermedius and right middle lobe 

atelectasis.  There was also atelectatic changes in lung bases bilaterally with 

a right middle lobe atelectasis right lower lobe atelectasis and bilateral 

pleural effusions.  Upper lobes are quite patent and well hydrated.  The patient

was started back on IV Zosyn.  She did become hypotensive briefly and she was 

given a liter of IV fluid bolus and currently she is on normal saline at rate of

100 mL an hour.  She is improved her blood pressure and she did not require any 

pressors.  She is currently awake.  She is following commands.  However, she has

a very poor insight on her condition.  She is not aware that she is quite 

debilitated in Johnny major disadvantage for recovering from her chronic 

illness.  The patient also is receiving enteral feeding for nutritional support 

in the form of vitamin 1.2 which is currently at goal.  I added this patient IV 

Zosyn.  She is on Lovenox for DVT prophylaxis.  CAT scan of the chest also 

showed emphysema bilaterally.  She is on bronchodilators.  She is also on IV 

Solu-Medrol.  IV Protonix for GI prophylaxis.  She'll





Objective





- Vital Signs


Vital signs: 


                                   Vital Signs











Temp  98.8 F   09/28/19 04:00


 


Pulse  94   09/28/19 07:58


 


Resp  23   09/28/19 07:00


 


BP  91/72   09/28/19 07:00


 


Pulse Ox  95   09/28/19 07:00








                                 Intake & Output











 09/27/19 09/28/19 09/28/19





 18:59 06:59 18:59


 


Intake Total 1000 2140 50


 


Output Total 600 1065 50


 


Balance 400 1075 0


 


Weight 34.02 kg 34.5 kg 


 


Intake:   


 


  IV  1800 50


 


    Piperacillin-Tazobactam 3  100 





    .375 gm In Sodium   





    Chloride 0.9% 100 ml @ 25   





    mls/hr IVPB Q8H [facility] Rx#:   





    [id number]   


 


    Potassium Chloride 20 meq   50





    In Water For Injection 1   





    100ml.bag @ 50 mls/hr   





    IVPB Q2H [facility] Rx#:   





    [id number]   


 


    Sodium Chloride 0.9% 1,  1700 





    000 ml @ 999 mls/hr IV .   





    Q1H1M [facility] Rx#:[id number]   


 


  Intake, IV Titration 700  





  Amount   


 


    Sodium Chloride 0.9% 1, 700  





    000 ml @ 100 mls/hr IV .   





    Q10H [facility] Rx#:[id number]   


 


  Tube Feeding 210 280 


 


  Other 90 60 


 


Output:   


 


  Urine 600 1065 50


 


Other:   


 


  Voiding Method Diaper Indwelling Catheter 














- Exam








 GENERAL EXAM: Alert, , cachectic 54-year-old white female, currently on a BiPAP

at a pressure of 10/5 cm of water.  Very weak.  Very debilitated.  BMI of 11.9. 

Significant malnourishment.  Significant loss in total body muscle mass.  Major 

atrophy of the muscles in lower extremities bilaterally in the arms.  


HEAD: Normocephalic/atraumatic.


EYES: Normal reaction of pupils, equal size.  Conjunctiva pink, sclera white.


NOSE: Clear with pink turbinates.


THROAT: No erythema or exudates.


NECK: No masses, no JVD, no thyroid enlargement, no adenopathy.


CHEST: No chest wall deformity.  Symmetrical expansion. 


LUNGS: significant diminishment in the breast sounds bilaterally especially in 

the lung bases.  With the BiPAP on, the patient is able to generate tidal volume

around 300s.CVS: Regular rate and rhythm, normal S1 and S2, no gallops, no 

murmurs, no rubs


Cardiac exam revealed the PMI to be normally situated and sized. The rhythm was 

regular and no extrasystoles were noted during several minutes of auscultation. 

The first and second heart sounds were normal and physiologic splitting of the 

second heart sound was noted. There were no murmurs, rubs, clicks, or gallops.


ABDOMEN: Soft, nontender.  No hepatosplenomegaly, normal bowel sounds, no 

guarding or rigidity.  Gastric tube insertion site is clean dry and intact, 

gastric tube is covered with the dressing


EXTREMITIES: No clubbing, no edema, no cyanosis, 2+ pulses and upper and lower 

extremities.


MUSCULOSKELETAL: severe muscle atrophy, major loss and muscle bulk and muscle 

mass.  No body fat visualized.  Significant motor weakness in the upper and 

lower extremities.  The weakness is nonfocal.


SPINE: No scoliosis or deformity


SKIN: No rashes


CENTRAL NERVOUS SYSTEM: Alert and oriented -3.  No focal deficits, tone  of the

muscles are low.   all 4 extremities.


PSYCHIATRIC: Alert and oriented -3.  Appropriate affect.  Intact judgment and 

insight.








- Labs


CBC & Chem 7: 


                                 09/28/19 05:36





                                 09/28/19 05:36


Labs: 


                  Abnormal Lab Results - Last 24 Hours (Table)











  09/27/19 09/27/19 09/27/19 Range/Units





  11:24 11:24 11:55 


 


WBC  15.5 H    (3.8-10.6)  k/uL


 


RBC  3.07 L    (3.80-5.40)  m/uL


 


Hgb  9.7 L    (11.4-16.0)  gm/dL


 


Hct  30.8 L    (34.0-46.0)  %


 


MCV  100.3 H    (80.0-100.0)  fL


 


Plt Count  531 H    (150-450)  k/uL


 


Neutrophils #  14.6 H    (1.3-7.7)  k/uL


 


Lymphocytes #  0.1 L    (1.0-4.8)  k/uL


 


ABG pH     (7.35-7.45)  


 


ABG pCO2     (35-45)  mmHg


 


ABG pO2     ()  mmHg


 


ABG HCO3     (21-25)  mmol/L


 


ABG Total CO2     (19-24)  mmol/L


 


ABG O2 Saturation     (94-97)  %


 


Potassium     (3.5-5.1)  mmol/L


 


Carbon Dioxide   33 H   (22-30)  mmol/L


 


BUN   26 H   (7-17)  mg/dL


 


Creatinine   0.25 L   (0.52-1.04)  mg/dL


 


Glucose   102 H   (74-99)  mg/dL


 


POC Glucose (mg/dL)    108 H  (75-99)  mg/dL


 


Urine Appearance     (Clear)  


 


Urine Protein     (Negative)  


 


Ur Leukocyte Esterase     (Negative)  


 


Urine RBC     (0-5)  /hpf


 


Urine WBC     (0-5)  /hpf


 


Hyaline Casts     (0-2)  /lpf


 


Urine Mucus     (None)  /hpf


 


Urine Yeast (Budding)     (None)  /hpf














  09/27/19 09/27/19 09/27/19 Range/Units





  16:00 16:32 20:03 


 


WBC     (3.8-10.6)  k/uL


 


RBC     (3.80-5.40)  m/uL


 


Hgb     (11.4-16.0)  gm/dL


 


Hct     (34.0-46.0)  %


 


MCV     (80.0-100.0)  fL


 


Plt Count     (150-450)  k/uL


 


Neutrophils #     (1.3-7.7)  k/uL


 


Lymphocytes #     (1.0-4.8)  k/uL


 


ABG pH    7.28 L  (7.35-7.45)  


 


ABG pCO2    83 H*  (35-45)  mmHg


 


ABG pO2    72 L  ()  mmHg


 


ABG HCO3    39 H  (21-25)  mmol/L


 


ABG Total CO2    42 H  (19-24)  mmol/L


 


ABG O2 Saturation    90.9 L  (94-97)  %


 


Potassium     (3.5-5.1)  mmol/L


 


Carbon Dioxide     (22-30)  mmol/L


 


BUN     (7-17)  mg/dL


 


Creatinine     (0.52-1.04)  mg/dL


 


Glucose     (74-99)  mg/dL


 


POC Glucose (mg/dL)   110 H   (75-99)  mg/dL


 


Urine Appearance  Cloudy H    (Clear)  


 


Urine Protein  Trace H    (Negative)  


 


Ur Leukocyte Esterase  Small H    (Negative)  


 


Urine RBC  11 H    (0-5)  /hpf


 


Urine WBC  16 H    (0-5)  /hpf


 


Hyaline Casts  9 H    (0-2)  /lpf


 


Urine Mucus  Few H    (None)  /hpf


 


Urine Yeast (Budding)  Many H    (None)  /hpf














  09/27/19 09/27/19 09/28/19 Range/Units





  21:16 23:48 05:36 


 


WBC     (3.8-10.6)  k/uL


 


RBC    2.67 L  (3.80-5.40)  m/uL


 


Hgb    8.5 L  (11.4-16.0)  gm/dL


 


Hct    27.2 L  (34.0-46.0)  %


 


MCV    101.8 H  (80.0-100.0)  fL


 


Plt Count    489 H  (150-450)  k/uL


 


Neutrophils #    10.1 H  (1.3-7.7)  k/uL


 


Lymphocytes #    0.1 L  (1.0-4.8)  k/uL


 


ABG pH     (7.35-7.45)  


 


ABG pCO2  69 H    (35-45)  mmHg


 


ABG pO2  62 L    ()  mmHg


 


ABG HCO3  41 H*    (21-25)  mmol/L


 


ABG Total CO2  43 H    (19-24)  mmol/L


 


ABG O2 Saturation  89.9 L    (94-97)  %


 


Potassium     (3.5-5.1)  mmol/L


 


Carbon Dioxide     (22-30)  mmol/L


 


BUN     (7-17)  mg/dL


 


Creatinine     (0.52-1.04)  mg/dL


 


Glucose     (74-99)  mg/dL


 


POC Glucose (mg/dL)   125 H   (75-99)  mg/dL


 


Urine Appearance     (Clear)  


 


Urine Protein     (Negative)  


 


Ur Leukocyte Esterase     (Negative)  


 


Urine RBC     (0-5)  /hpf


 


Urine WBC     (0-5)  /hpf


 


Hyaline Casts     (0-2)  /lpf


 


Urine Mucus     (None)  /hpf


 


Urine Yeast (Budding)     (None)  /hpf














  09/28/19 09/28/19 Range/Units





  05:36 05:57 


 


WBC    (3.8-10.6)  k/uL


 


RBC    (3.80-5.40)  m/uL


 


Hgb    (11.4-16.0)  gm/dL


 


Hct    (34.0-46.0)  %


 


MCV    (80.0-100.0)  fL


 


Plt Count    (150-450)  k/uL


 


Neutrophils #    (1.3-7.7)  k/uL


 


Lymphocytes #    (1.0-4.8)  k/uL


 


ABG pH    (7.35-7.45)  


 


ABG pCO2    (35-45)  mmHg


 


ABG pO2    ()  mmHg


 


ABG HCO3    (21-25)  mmol/L


 


ABG Total CO2    (19-24)  mmol/L


 


ABG O2 Saturation    (94-97)  %


 


Potassium  3.4 L   (3.5-5.1)  mmol/L


 


Carbon Dioxide  38 H   (22-30)  mmol/L


 


BUN  18 H   (7-17)  mg/dL


 


Creatinine  0.28 L   (0.52-1.04)  mg/dL


 


Glucose  146 H   (74-99)  mg/dL


 


POC Glucose (mg/dL)   153 H  (75-99)  mg/dL


 


Urine Appearance    (Clear)  


 


Urine Protein    (Negative)  


 


Ur Leukocyte Esterase    (Negative)  


 


Urine RBC    (0-5)  /hpf


 


Urine WBC    (0-5)  /hpf


 


Hyaline Casts    (0-2)  /lpf


 


Urine Mucus    (None)  /hpf


 


Urine Yeast (Budding)    (None)  /hpf








                      Microbiology - Last 24 Hours (Table)











 09/27/19 16:00 Urine Culture - Preliminary





 Urine,Voided 


 


 09/26/19 12:40 Blood Culture - Preliminary





 Blood    No Growth after 24 hours














Assessment and Plan


Plan: 











1 acute on chronic hypoxic/hypercapnic respiratory failure.  The patient's blood

gases from yesterday showed further worsening of respiratory acidosis and the 

patient was in significant respiratory distress.  The patient got transferred to

the intensive care unit and she was placed on BiPAP for respiratory support and 

currently she is at a pressure of 10/5 cm of water.  CAT scan of the chest shows

obstruction of the distal bronchus intermedius and atelectasis of the right 

middle lobe and the right lower lobe in addition to I lateral pleural effusions.

 This could be related to a mucous plug in the distal rhonchus intermedius.  Th

is could be also  an endobronchial tumor.  Unfortunately, unable to bronchoscope

this patient the cause of her poor baseline performance and functional status, 

and had inability to come off the BiPAP and had inability to tolerate such a 

procedure under conscious sedation.  We'll try to avoid intubation this patient 

knowing that intubation could potentially be permanent and I do not see that the

patient may be able to get weaned at a later stage.  However, I'm going to talk 

to the family.  They're going to undertake this intubation process, I'll proceed

with it and an incentive bronchoscope her to evaluate her right lower lobe.  I'm

pretty much sure that her prognosis poor based on what I'm seeing in terms of 

her severe malnourishment and severe protein calorie malnutrition, will be 

extremely poor.  In any rate, having to have a discussion with the family once 

they arrive.  For now she is tolerating a BiPAP well and this will be continued.





2 chronic hypoxic and hypercapnic respiratory failure secondary to above





3 COPD





4 laryngeal cancer post chemoradiation therapy and last treatment was in general

2019 and she was treated through [facility].  





5 severe protein calorie malnutrition with a BMI of 11.9





6 severe anorexia and cachexia and the patient has an enteral feeding for 

nutritional support via PEG tube





7 chronic smoking





8 chronic anemia





9 mediastinal lymphadenopathy as noted on previous CAT scan of the chest.  

Unable to work her up at this point in time because of her underlying condition.





Plan 





The patient's pro-calcitonin level is low at 0.07.  Underlying pneumonia is 

doubtful at this stage.  I covered empirically with IV Zosyn.  Ideally she would

need intubation and mechanical ventilation bronchoscopy to evaluate the distal 

bronchus intermedius and evaluated this patient for the atelectatic changes in 

the right lower lobe and the right middle lobe.  However, this may be a 

permanent thing and I would like to discuss this with the family prior to 

proceeding to such intervention.  In my opinion, she carries a very poor 

prognosis based on the above-mentioned comorbidities.  I'm going to have a 

discussion with the family and establish goals of treatment.  If willing to 

undertake the treatment, will intubate and proceed with a bronchoscopy to 

evaluate the right lung and would optimize her chance of recovery accordingly.  

If not, we'll make further recommendations regarding supportive care and 

possibly comfort care measures if the patient's family is willing to undertake 

that throughout.  I think it's reasonable Route as the patient is a very poor 

prognosis based on her overall performance and functional status being extremely

poor.  Continue enteral feeding for now.  Continue bronchodilators.  Continue 

steroids.  DVT and GI prophylaxis.  Frequent positioning.  Give the patient ICU 

for now.  Kept on IV fluids to 50 mL an hour.  Continue enteral nutrition via 

PEG tube.  This evaluation was done more than 30 minutes.


Time with Patient: Greater than 30

## 2019-09-28 NOTE — XR
EXAMINATION TYPE: XR chest 1V

 

DATE OF EXAM: 9/28/2019

 

HISTORY: SOB.

 

REFERENCE: Previous study dated 9/26/2019.

 

FINDINGS: A MediPort is in place via a right internal jugular approach. Its tip is in the right atriu
m. There is a left basilic PICC line in place. Its tip is also in the right atrium.

 

There is volume loss in the right middle lobe. There is a lesser degree of volume loss in the left eulogio
ng base. There are small, bilateral effusions. There has been no interval change in the appearance of
 the chest.

 

IMPRESSION:

 

NO SIGNIFICANT INTERVAL CHANGE IN THE APPEARANCE OF THE CHEST.

## 2019-09-29 LAB
ANION GAP SERPL CALC-SCNC: 5 MMOL/L
BASOPHILS # BLD AUTO: 0 K/UL (ref 0–0.2)
BASOPHILS NFR BLD AUTO: 0 %
BUN SERPL-SCNC: 21 MG/DL (ref 7–17)
CALCIUM SPEC-MCNC: 8.9 MG/DL (ref 8.4–10.2)
CHLORIDE SERPL-SCNC: 105 MMOL/L (ref 98–107)
CO2 SERPL-SCNC: 37 MMOL/L (ref 22–30)
EOSINOPHIL # BLD AUTO: 0 K/UL (ref 0–0.7)
EOSINOPHIL NFR BLD AUTO: 0 %
ERYTHROCYTE [DISTWIDTH] IN BLOOD BY AUTOMATED COUNT: 3.09 M/UL (ref 3.8–5.4)
ERYTHROCYTE [DISTWIDTH] IN BLOOD: 14.3 % (ref 11.5–15.5)
GLUCOSE BLD-MCNC: 103 MG/DL (ref 75–99)
GLUCOSE BLD-MCNC: 111 MG/DL (ref 75–99)
GLUCOSE BLD-MCNC: 127 MG/DL (ref 75–99)
GLUCOSE BLD-MCNC: 133 MG/DL (ref 75–99)
GLUCOSE BLD-MCNC: 94 MG/DL (ref 75–99)
GLUCOSE SERPL-MCNC: 123 MG/DL (ref 74–99)
HCT VFR BLD AUTO: 31.1 % (ref 34–46)
HGB BLD-MCNC: 9.8 GM/DL (ref 11.4–16)
LYMPHOCYTES # SPEC AUTO: 0.1 K/UL (ref 1–4.8)
LYMPHOCYTES NFR SPEC AUTO: 1 %
MCH RBC QN AUTO: 31.6 PG (ref 25–35)
MCHC RBC AUTO-ENTMCNC: 31.4 G/DL (ref 31–37)
MCV RBC AUTO: 100.7 FL (ref 80–100)
MONOCYTES # BLD AUTO: 0.3 K/UL (ref 0–1)
MONOCYTES NFR BLD AUTO: 3 %
NEUTROPHILS # BLD AUTO: 9.4 K/UL (ref 1.3–7.7)
NEUTROPHILS NFR BLD AUTO: 95 %
PLATELET # BLD AUTO: 548 K/UL (ref 150–450)
POTASSIUM SERPL-SCNC: 3.4 MMOL/L (ref 3.5–5.1)
SODIUM SERPL-SCNC: 147 MMOL/L (ref 137–145)
WBC # BLD AUTO: 9.8 K/UL (ref 3.8–10.6)

## 2019-09-29 RX ADMIN — INSULIN ASPART SCH: 100 INJECTION, SOLUTION INTRAVENOUS; SUBCUTANEOUS at 12:09

## 2019-09-29 RX ADMIN — IPRATROPIUM BROMIDE AND ALBUTEROL SULFATE SCH ML: .5; 3 SOLUTION RESPIRATORY (INHALATION) at 07:08

## 2019-09-29 RX ADMIN — IPRATROPIUM BROMIDE AND ALBUTEROL SULFATE SCH ML: .5; 3 SOLUTION RESPIRATORY (INHALATION) at 11:20

## 2019-09-29 RX ADMIN — PIPERACILLIN AND TAZOBACTAM SCH MLS/HR: 3; .375 INJECTION, POWDER, FOR SOLUTION INTRAVENOUS at 00:42

## 2019-09-29 RX ADMIN — POTASSIUM CHLORIDE SCH MLS/HR: 14.9 INJECTION, SOLUTION INTRAVENOUS at 06:58

## 2019-09-29 RX ADMIN — INSULIN ASPART SCH: 100 INJECTION, SOLUTION INTRAVENOUS; SUBCUTANEOUS at 06:19

## 2019-09-29 RX ADMIN — MORPHINE SULFATE PRN MG: 4 INJECTION, SOLUTION INTRAMUSCULAR; INTRAVENOUS at 17:22

## 2019-09-29 RX ADMIN — INSULIN ASPART SCH: 100 INJECTION, SOLUTION INTRAVENOUS; SUBCUTANEOUS at 00:23

## 2019-09-29 RX ADMIN — METHYLPREDNISOLONE SODIUM SUCCINATE SCH MG: 40 INJECTION, POWDER, FOR SOLUTION INTRAMUSCULAR; INTRAVENOUS at 08:34

## 2019-09-29 RX ADMIN — MORPHINE SULFATE PRN MG: 4 INJECTION, SOLUTION INTRAMUSCULAR; INTRAVENOUS at 13:54

## 2019-09-29 RX ADMIN — METHYLPREDNISOLONE SODIUM SUCCINATE SCH MG: 40 INJECTION, POWDER, FOR SOLUTION INTRAMUSCULAR; INTRAVENOUS at 22:20

## 2019-09-29 RX ADMIN — METHYLPREDNISOLONE SODIUM SUCCINATE SCH MG: 125 INJECTION, POWDER, FOR SOLUTION INTRAMUSCULAR; INTRAVENOUS at 00:27

## 2019-09-29 RX ADMIN — ISODIUM CHLORIDE PRN MG: 0.03 SOLUTION RESPIRATORY (INHALATION) at 03:29

## 2019-09-29 RX ADMIN — ENOXAPARIN SODIUM SCH MG: 40 INJECTION SUBCUTANEOUS at 08:34

## 2019-09-29 RX ADMIN — MORPHINE SULFATE PRN MG: 4 INJECTION, SOLUTION INTRAMUSCULAR; INTRAVENOUS at 10:19

## 2019-09-29 RX ADMIN — MORPHINE SULFATE PRN MG: 4 INJECTION, SOLUTION INTRAMUSCULAR; INTRAVENOUS at 22:20

## 2019-09-29 RX ADMIN — PANTOPRAZOLE SODIUM SCH MG: 40 INJECTION, POWDER, FOR SOLUTION INTRAVENOUS at 08:34

## 2019-09-29 RX ADMIN — POTASSIUM CHLORIDE SCH MLS/HR: 14.9 INJECTION, SOLUTION INTRAVENOUS at 08:34

## 2019-09-29 RX ADMIN — IPRATROPIUM BROMIDE AND ALBUTEROL SULFATE SCH ML: .5; 3 SOLUTION RESPIRATORY (INHALATION) at 16:08

## 2019-09-29 RX ADMIN — PIPERACILLIN AND TAZOBACTAM SCH MLS/HR: 3; .375 INJECTION, POWDER, FOR SOLUTION INTRAVENOUS at 16:00

## 2019-09-29 RX ADMIN — PIPERACILLIN AND TAZOBACTAM SCH MLS/HR: 3; .375 INJECTION, POWDER, FOR SOLUTION INTRAVENOUS at 06:58

## 2019-09-29 RX ADMIN — INSULIN ASPART SCH: 100 INJECTION, SOLUTION INTRAVENOUS; SUBCUTANEOUS at 19:03

## 2019-09-29 RX ADMIN — MORPHINE SULFATE PRN MG: 4 INJECTION, SOLUTION INTRAMUSCULAR; INTRAVENOUS at 05:33

## 2019-09-29 RX ADMIN — IPRATROPIUM BROMIDE AND ALBUTEROL SULFATE SCH ML: .5; 3 SOLUTION RESPIRATORY (INHALATION) at 20:37

## 2019-09-29 NOTE — P.PN
Subjective


Progress Note Date: 09/29/19








 This is a 54-year-old female patient of Dr. Jerome, who was recently hospital

ized for acute respiratory failure secondary to aspiration pneumonia, hypoxemic 

and hypercapnic requiring intubation and placement on mechanical ventilation.  

Patient was successfully extubated, was treated with antibiotics, completed a 

course of Levaquin and Zosyn.  Patient showed significant clinical and 

radiographic improvement, by the time of her discharge to the [facility].  Patient also

has history of laryngeal cancer with previous chemoradiation, and had evidence 

of severe malnutrition.  In addition patient failed modified barium swallow, and

was having evidence of aspiration with all consistencies.  Patient also had 

evidence of electrolyte abnormality with hyponatremia and hypokalemia, which 

improved with treatment.  Patient had a gastrostomy tube placed for nutritional 

support, and she was strict NPO.  Sputum cultures from previous admission were 

negative.  Other medical history includes chronic anemia of chronic disease, 

previous tobacco dependence.  Patient was discharged to the [facility] on 09/25/2019 however she started experiencing shortness of breath at the 

[facility], and was apparently hypoxic and was brought back for evaluation on oral 

09/26/2019.  He denied any chest pain, she denies any oral intake at the nursing

home home, she states she was getting her nutrition strictly through the 

gastrostomy tube.  No aspiration, no fever or chills, chest x-ray showed basilar

atelectasis and associated pleural effusions.  Lab work showed white blood cell 

count of 14.0, hemoglobin of 11, correlation profile was within normal limits, 

blood gas showed pO2 of 64, pCO2 of 67, and pH of 7.32.  Sodium was 140, 

potassium is 4.0, chloride is 98, CO2 37, B1 is 21, creatinine 0.24.  O2 sat in 

the emergency department was ranging from 84-86 patient was placed on 

supplemental oxygen.  Patient was given IV steroids, nebulized bronchodilators, 

and worsening the patient in consultation for shortness of breath.  She is awake

and alert, she is sitting up in bed, appears to be in no acute distress, 

currently on 2 L of oxygen, no significant wheezing, or congestion, she does h

ave a weak cough, which does not sound congested, lung sounds are diminished, no

rales auscultated.  Follow-up blood work was reviewed today, white blood cell 

count is 15.5, hemoglobin is 9.7, repeat blood gas showed pO2 of 60, pCO2 59, 

and pH of 7.39 this was done on FiO2 28%.  Patient looks extremely cachectic and

weak, her voice is very weak and hoarse, but she appears to be in no acute 

distress.





On 09/28/2019 I'm seeing this patient for a follow-up.  This is an extremely 

debilitated and severely malnourished female patient who is post respiratory 

failure who was readmitted for ongoing difficulties in breathing.  Noted the 

patient's BMI is 11.9 and she has kwashiorkor.  The patient got moved yesterday 

to the intensive care unit because of worsening shortness of breath and altered 

mentation.  Note that on the floor the patient had a blood gas showing a pH of  

0.28 with a pCO2 of 83 and pO2 of 72.  This was on FiO2 of 32%.  She was having 

increased tachypnea and respiratory distress.  At that point the patient got 

transferred to the intensive care unit.  I was very hesitant intubated this 

patient based on an underlying condition and status.  Note that the patient is 

extremely frail and has absolutely no muscle mass and intubation may be quite 

devastating for this patient had the patient accordingly was placed on a BiPAP 

at a pressure of 10/5 cm of water.  She was also given some Ativan and morphine 

which.  Much calm that down.  Her subsequent blood gas showed a pH of 7.38 with 

a pCO2 of 69 a pO2 of 62.  I realized the patient's respiratory insufficiency he

had it performed a CAT scan of the chest yesterday and the CAT scan showed 

complete occlusion of the distal bronchus intermedius and right middle lobe 

atelectasis.  There was also atelectatic changes in lung bases bilaterally with 

a right middle lobe atelectasis right lower lobe atelectasis and bilateral 

pleural effusions.  Upper lobes are quite patent and well hydrated.  The patient

was started back on IV Zosyn.  She did become hypotensive briefly and she was 

given a liter of IV fluid bolus and currently she is on normal saline at rate of

100 mL an hour.  She is improved her blood pressure and she did not require any 

pressors.  She is currently awake.  She is following commands.  However, she has

a very poor insight on her condition.  She is not aware that she is quite 

debilitated in Johnny major disadvantage for recovering from her chronic 

illness.  The patient also is receiving enteral feeding for nutritional support 

in the form of vitamin 1.2 which is currently at goal.  I added this patient IV 

Zosyn.  She is on Lovenox for DVT prophylaxis.  CAT scan of the chest also 

showed emphysema bilaterally.  She is on bronchodilators.  She is also on IV 

Solu-Medrol.  IV Protonix for GI prophylaxis.  





On 09/29/2019, the patient is being seen in follow-up she is awake and alert.  

She is following commands.  She is on a BiPAP at a pressure of 10/5 cm of water 

with an FiO2 of 40%.  The patient is very much compliant and tolerating her 

BiPAP.  No significant cough or sputum production.  The chest x-ray from today 

shows small bilateral pleural effusion, right middle lobe/right lower lobe 

atelectasis.  Her pro-calcitonin level was low.  She is covered with IV Zosyn.  

She is also covered with IV Solu-Medrol and the dose was reduced down to 40 mg 

every 12 hours.  He is on DuoNeb nebulized treatments around the clock and she 

is receiving enteral feeding for nutritional support.  Sodium level is up to 

147.  Atelectatic discussion with the patient's family.  The family wants 

everything done including the possibility of long-term vent support.  In that 

case, it made recommendations to proceed with a tracheostomy and the patient is 

unable to breathe independently without any form of respiratory  assisting 

devices and the patient is quite debilitated and has significant malnutrition 

and neuromuscular weakness.  





Objective





- Vital Signs


Vital signs: 


                                   Vital Signs











Temp  98.8 F   09/29/19 04:00


 


Pulse  108 H  09/29/19 07:20


 


Resp  28 H  09/29/19 07:00


 


BP  133/90   09/29/19 07:00


 


Pulse Ox  93 L  09/29/19 07:00








                                 Intake & Output











 09/28/19 09/29/19 09/29/19





 18:59 06:59 18:59


 


Intake Total 1620 1060 250


 


Output Total 820 760 150


 


Balance 800 300 100


 


Weight  34 kg 


 


Intake:   


 


  IV 1050 650 250


 


    .9  400 50


 


    Piperacillin-Tazobactam 3 200 100 100





    .375 gm In Sodium   





    Chloride 0.9% 100 ml @ 25   





    mls/hr IVPB Q8H CANELO Rx#:   





    [id number]   


 


    Potassium Chloride 20 meq 200  100





    In Water For Injection 1   





    100ml.bag @ 50 mls/hr   





    IVPB Q2H CANELO Rx#:   





    [id number]   


 


    Sodium Chloride 0.9% 1, 650 150 





    000 ml @ 999 mls/hr IV .   





    Q1H1M ONE Rx#:[id number]   


 


  Tube Feeding 480 320 


 


  Other 90 90 


 


Output:   


 


  Urine 820 760 150


 


Other:   


 


  Voiding Method Indwelling Catheter Indwelling Catheter 














- Exam








 GENERAL EXAM: Alert, , cachectic 54-year-old white female, currently on a BiPAP

at a pressure of 10/5 cm of water.  Very weak.  Very debilitated.  BMI of 11.9. 

Significant malnourishment.  Significant loss in total body muscle mass.  Major 

atrophy of the muscles in lower extremities bilaterally in the arms.  The 

patient continues to be tolerating her BiPAP at a pressure of 10/5 cm of water. 

She is emanating tidal volumes in the range of 250-300 mL.  No significant 

tachypnea.


HEAD: Normocephalic/atraumatic.


EYES: Normal reaction of pupils, equal size.  Conjunctiva pink, sclera white.


NOSE: Clear with pink turbinates.


THROAT: No erythema or exudates.


NECK: No masses, no JVD, no thyroid enlargement, no adenopathy.


CHEST: No chest wall deformity.  Symmetrical expansion. 


LUNGS: significant diminishment in the breast sounds bilaterally especially in 

the lung bases.  With the BiPAP on, the patient is able to generate tidal volume

around 300s.CVS: Regular rate and rhythm, normal S1 and S2, no gallops, no 

murmurs, no rubs


Cardiac exam revealed the PMI to be normally situated and sized. The rhythm was 

regular and no extrasystoles were noted during several minutes of auscultation. 

The first and second heart sounds were normal and physiologic splitting of the 

second heart sound was noted. There were no murmurs, rubs, clicks, or gallops.


ABDOMEN: Soft, nontender.  No hepatosplenomegaly, normal bowel sounds, no 

guarding or rigidity.  Gastric tube insertion site is clean dry and intact, 

gastric tube is covered with the dressing


EXTREMITIES: No clubbing, no edema, no cyanosis, 2+ pulses and upper and lower 

extremities.


MUSCULOSKELETAL: severe muscle atrophy, major loss and muscle bulk and muscle 

mass.  No body fat visualized.  Significant motor weakness in the upper and 

lower extremities.  The weakness is nonfocal.


SPINE: No scoliosis or deformity


SKIN: No rashes


CENTRAL NERVOUS SYSTEM: Alert and oriented -3.  No focal deficits, tone  of the

muscles are low.   all 4 extremities.


PSYCHIATRIC: Alert and oriented -3.  Appropriate affect.  Intact judgment and 

insight.








- Labs


CBC & Chem 7: 


                                 09/29/19 04:12





                                 09/29/19 04:12


Labs: 


                  Abnormal Lab Results - Last 24 Hours (Table)











  09/28/19 09/28/19 09/28/19 Range/Units





  11:58 18:53 23:58 


 


RBC     (3.80-5.40)  m/uL


 


Hgb     (11.4-16.0)  gm/dL


 


Hct     (34.0-46.0)  %


 


MCV     (80.0-100.0)  fL


 


Plt Count     (150-450)  k/uL


 


Neutrophils #     (1.3-7.7)  k/uL


 


Lymphocytes #     (1.0-4.8)  k/uL


 


Sodium     (137-145)  mmol/L


 


Potassium     (3.5-5.1)  mmol/L


 


Carbon Dioxide     (22-30)  mmol/L


 


BUN     (7-17)  mg/dL


 


Creatinine     (0.52-1.04)  mg/dL


 


Glucose     (74-99)  mg/dL


 


POC Glucose (mg/dL)  117 H  126 H  127 H  (75-99)  mg/dL














  09/29/19 09/29/19 09/29/19 Range/Units





  04:12 04:12 05:56 


 


RBC  3.09 L    (3.80-5.40)  m/uL


 


Hgb  9.8 L    (11.4-16.0)  gm/dL


 


Hct  31.1 L    (34.0-46.0)  %


 


MCV  100.7 H    (80.0-100.0)  fL


 


Plt Count  548 H    (150-450)  k/uL


 


Neutrophils #  9.4 H    (1.3-7.7)  k/uL


 


Lymphocytes #  0.1 L    (1.0-4.8)  k/uL


 


Sodium   147 H   (137-145)  mmol/L


 


Potassium   3.4 L   (3.5-5.1)  mmol/L


 


Carbon Dioxide   37 H   (22-30)  mmol/L


 


BUN   21 H   (7-17)  mg/dL


 


Creatinine   0.25 L   (0.52-1.04)  mg/dL


 


Glucose   123 H   (74-99)  mg/dL


 


POC Glucose (mg/dL)    133 H  (75-99)  mg/dL








                      Microbiology - Last 24 Hours (Table)











 09/27/19 16:00 Urine Culture - Final





 Urine,Voided    Candida albicans


 


 09/26/19 12:40 Blood Culture - Preliminary





 Blood    No Growth after 48 hours














Assessment and Plan


Plan: 











1 acute on chronic hypoxic/hypercapnic respiratory failure.  The patient's blood

gases from yesterday showed further worsening of respiratory acidosis and the 

patient was in significant respiratory distress.  The patient got transferred to

the intensive care unit and she was placed on BiPAP for respiratory support and 

currently she is at a pressure of 10/5 cm of water.  CAT scan of the chest shows

obstruction of the distal bronchus intermedius and atelectasis of the right 

middle lobe and the right lower lobe in addition to I lateral pleural effusions.

 This could be related to a mucous plug in the distal rhonchus intermedius.  

This could be also  an endobronchial tumor.  Unfortunately, unable to bron

choscope this patient the cause of her poor baseline performance and functional 

status, and had inability to come off the BiPAP and had inability to tolerate 

such a procedure under conscious sedation.  We'll try to avoid intubation this 

patient knowing that intubation could potentially be permanent and I do not see 

that the patient may be able to get weaned at a later stage.  However, I'm going

to talk to the family.  They're going to undertake this intubation process, I'll

proceed with it and an incentive bronchoscope her to evaluate her right lower 

lobe.  I'm pretty much sure that her prognosis poor based on what I'm seeing in 

terms of her severe malnourishment and severe protein calorie malnutrition, will

be extremely poor.  In any rate, having to have a discussion with the family 

once they arrive.  For now she is tolerating a BiPAP well and this will be 

continued.





On 09/29/2019, chest x-ray still showing atelectatic changes in the right lung 

base involving the right middle lobe and the right lower lobe and bilateral 

pleural effusion.  The patient is BiPAP dependent.  I do not think the patient 

be able to wean off the BiPAP.  She will need long-term respiratory assisted 

devices.  This is essentially told her severe malnourishment and neuromuscular 

weakness.  I talked to the family and there wanting everything to be done.  I 

think is reasonable at this point to consider tracheostomy and long-term vent 

support for the patient hoping that her nutritional status would improve 

gradually.





2 chronic hypoxic and hypercapnic respiratory failure secondary to above





3 COPD





4 laryngeal cancer post chemoradiation therapy and last treatment was in general

2019 and she was treated through [facility].  





5 severe protein calorie malnutrition with a BMI of 11.9





6 severe anorexia and cachexia and the patient has an enteral feeding for 

nutritional support via PEG tube





7 chronic smoking





8 chronic anemia





9 mediastinal lymphadenopathy as noted on previous CAT scan of the chest.  

Unable to work her up at this point in time because of her underlying condition.





10 mild hypernatremia





Plan 





The patient's pro-calcitonin level is low at 0.07.  Underlying pneumonia is d

oubtful at this stage.  I covered empirically with IV Zosyn.  Taper the sodium 

and opiate continue the bronchodilators.  Based on discussion done earlier, the 

patient will need long-term vent support.  The family once everything to be done

to this patient.  Based on all this, as an outpatient is going to be able to 

breathe independently without any form of respiratory assisting devices.  I'm 

recommending gets accustomed tube insertion with subsequent vent support on a 

long-term basis hoping that initiate renal status will improve.  Post 

tracheostomy tube insertion, it's reasonable to do a bronchoscopy to evaluate 

the right lower lobe as the patient has a right middle lobe/right lower lobe at

electasis.  The patient will be given a dose of Lasix 40 mg IV.  The IV Fluids 

to 50 ML an Hour.  Her Feeding for Nutritional Support.  Give water flushes, 

free water through the PEG tube.  Consult general surgery for a tracheostomy 

tube insertion.  There is a critically care evaluation that was done and more 

than 30 minutes.  I had a lengthy discussion with the family and all options 

were discussed.  Her long-term prognosis poor based on the above-mentioned 

comorbidities.


Time with Patient: Greater than 30

## 2019-09-29 NOTE — P.PN
Progress Note - Text


Progress Note Date: 09/29/19





ACP discussion approximately 20 minutes 





Family members:  present son Bernardo, brother or sister and niece





Patient diagnoses: Acute on chronic respiratory failure with hypoxia and 

hypercapnia, severe protein energy malnutrition currently on TF via open G-tube 

secondary to the dysphasia and neuromuscular weakness, history of aspiration 

pneumonia, history of laryngeal cancer status post chemo and radiation. 





I discussed with family the patient's extremely poor prognosis regarding the 

patient being in acute on chronic respiratory failure with hypoxia and is now 

currently BiPAP dependent, with right middle lobe and lower lobe atelectasis 

suspected due to mucous plugging versus endobronchial tumor.  With patient's 

declining pulmonary status and low probability of the patient will ever be able 

to be extubated if intubated due to severe neuromuscular weakness in the setting

of severe protein energy malnutrition BMI 11.9.  The patient's family is wanting

to proceed with tracheostomy placement 





CODE STATUS:  DO NOT RESUSCITATE

## 2019-09-29 NOTE — P.PN
Subjective


Progress Note Date: 09/29/19





The patient says that we will follow-up today,  Patient currently on BiPAP 10/5 

Fi02 40 %   CAT scan performed 9/26 indicating occlusion of the distal bronchus 

intermedius and right middle lobe atelectasis with right lower lobe atelectasis 

and bilateral pleural effusions.  Chest x-ray from today showing small bilateral

pleural effusions right middle lobe lower lobe atelectasis.  Patient continued 

on Zosyn, patient afebrile without leukocytosis Continues to be 

tachycardic.serum potassium marginally low at 3.4 today 





Objective





- Vital Signs


Vital signs: 


                                   Vital Signs











Temp  97.4 F L  09/29/19 08:00


 


Pulse  101 H  09/29/19 11:00


 


Resp  37 H  09/29/19 11:00


 


BP  108/75   09/29/19 11:00


 


Pulse Ox  91 L  09/29/19 11:00








                                 Intake & Output











 09/28/19 09/29/19 09/29/19





 18:59 06:59 18:59


 


Intake Total 1620 1060 250


 


Output Total 820 760 150


 


Balance 800 300 100


 


Weight  34 kg 


 


Intake:   


 


  IV 1050 650 250


 


    .9  400 50


 


    Piperacillin-Tazobactam 3 200 100 100





    .375 gm In Sodium   





    Chloride 0.9% 100 ml @ 25   





    mls/hr IVPB Q8H [facility] Rx#:   





    [id number]   


 


    Potassium Chloride 20 meq 200  100





    In Water For Injection 1   





    100ml.bag @ 50 mls/hr   





    IVPB Q2H [facility] Rx#:   





    [id number]   


 


    Sodium Chloride 0.9% 1, 650 150 





    000 ml @ 999 mls/hr IV .   





    Q1H1M ONE Rx#:[id number]   


 


  Tube Feeding 480 320 


 


  Other 90 90 


 


Output:   


 


  Urine 820 760 150


 


Other:   


 


  Voiding Method Indwelling Catheter Indwelling Catheter 














- Exam





Constitutional: Moderate respiratory, awake and alert cachectic appearance





Eyes: Anicteric sclerae, moist conjunctiva, no lid-lag, PERRLA





ENMT: Bilateral temporal wasting, 





 NC/AT,Oropharynx clear, no erythema, exudates





Neck:Supple, FROM, no masses, or JVD, No carotid bruits; No thyromegaly





Lungs: Decreased breath sounds bilaterally, currently on BiPAP fi02 40 % 





Cardiovascular: Regular rhythm tachycardia,  No murmurs, gallops, or rubs no 

peripheral edema





Abdominal: Soft Nontender, nom distended, no guarding, no rebound or  rigidity, 

Normoactive bowel sounds No hepatomegaly, No splenomegaly,  No palpable mass No 

abdominal wall hernia noted 





Skin: Normal temperature, tone, texture, turgor, No induration No subcutaneous 

nodules, No rash, lesions, No ulcers





Extremities:No digital cyanosis No clubbing, Pedal pulses intact and  

symmetrical Radial pulses intact and symmetrical Normal gait and station, No 

calf tenderness   


      


Neuro: Awake alert following commands no focal deficits appreciated








- Labs


CBC & Chem 7: 


                                 09/29/19 04:12





                                 09/29/19 04:12


Labs: 


                  Abnormal Lab Results - Last 24 Hours (Table)











  09/28/19 09/28/19 09/28/19 Range/Units





  11:58 18:53 23:58 


 


RBC     (3.80-5.40)  m/uL


 


Hgb     (11.4-16.0)  gm/dL


 


Hct     (34.0-46.0)  %


 


MCV     (80.0-100.0)  fL


 


Plt Count     (150-450)  k/uL


 


Neutrophils #     (1.3-7.7)  k/uL


 


Lymphocytes #     (1.0-4.8)  k/uL


 


Sodium     (137-145)  mmol/L


 


Potassium     (3.5-5.1)  mmol/L


 


Carbon Dioxide     (22-30)  mmol/L


 


BUN     (7-17)  mg/dL


 


Creatinine     (0.52-1.04)  mg/dL


 


Glucose     (74-99)  mg/dL


 


POC Glucose (mg/dL)  117 H  126 H  127 H  (75-99)  mg/dL














  09/29/19 09/29/19 09/29/19 Range/Units





  04:12 04:12 05:56 


 


RBC  3.09 L    (3.80-5.40)  m/uL


 


Hgb  9.8 L    (11.4-16.0)  gm/dL


 


Hct  31.1 L    (34.0-46.0)  %


 


MCV  100.7 H    (80.0-100.0)  fL


 


Plt Count  548 H    (150-450)  k/uL


 


Neutrophils #  9.4 H    (1.3-7.7)  k/uL


 


Lymphocytes #  0.1 L    (1.0-4.8)  k/uL


 


Sodium   147 H   (137-145)  mmol/L


 


Potassium   3.4 L   (3.5-5.1)  mmol/L


 


Carbon Dioxide   37 H   (22-30)  mmol/L


 


BUN   21 H   (7-17)  mg/dL


 


Creatinine   0.25 L   (0.52-1.04)  mg/dL


 


Glucose   123 H   (74-99)  mg/dL


 


POC Glucose (mg/dL)    133 H  (75-99)  mg/dL








                      Microbiology - Last 24 Hours (Table)











 09/27/19 16:00 Urine Culture - Final





 Urine,Voided    Candida albicans


 


 09/26/19 12:40 Blood Culture - Preliminary





 Blood    No Growth after 48 hours














Assessment and Plan


(1) Acute respiratory failure with hypoxia and hypercapnia


Narrative/Plan: 





* Secondary to distal bronchus obstruction and right middle and lower lobe 

  atelectasis with bilateral pleural effusions, concern for mucous plugging 

  versus endobronchial tumor


* Patient with episodes of respiratory distress with tachypnea desaturations


* Continue noninvasive respiratory support with BiPAP 


* Continue breathing treatments continue systemic steroids


* ABG suggesting hypoxia and hypercapnia


* Patient with low baseline function secondary to severe protein energy 

  malnutrition and anorexia BMI of 11.9


   * Agree with concerns that patient is a poor candidate for bronchoscopy due 

     to inability to wean off BiPAP and tolerate sedation


   * Attempting to avoid intubation at all costs


   * family apparently electing to proceed with trach placement 


* Appreciate pulmonary recommendations 


Current Visit: Yes   Status: Resolved   Code(s): J96.01 - ACUTE RESPIRATORY 

FAILURE WITH HYPOXIA; J96.02 - ACUTE RESPIRATORY FAILURE WITH HYPERCAPNIA   

SNOMED Code(s): 180059302


   





(2) Severe protein-energy malnutrition


Narrative/Plan: 





* Continue tube feeds currently at goal


* On vital 1.2 and 40 mL an hour


Current Visit: Yes   Status: Acute   Code(s): E43 - UNSPECIFIED SEVERE PROTEIN-

CALORIE MALNUTRITION   SNOMED Code(s): 516252042


   





(3) Mediastinal lymphadenopathy


Current Visit: Yes   Status: Acute   Code(s): R59.0 - LOCALIZED ENLARGED LYMPH 

NODES   SNOMED Code(s): 59938298


   





(4) Weakness


Narrative/Plan: 





* PT consulted 


Current Visit: Yes   Status: Chronic   Code(s): R53.1 - WEAKNESS   SNOMED 

Code(s): 65281358


   





(5) Leukocytosis


Narrative/Plan: 





* Patient afebrile we'll recheck CBC today


* We'll check urinalysis and blood cultures


Current Visit: Yes   Status: Resolved   Code(s): D72.829 - ELEVATED WHITE BLOOD 

CELL COUNT, UNSPECIFIED   SNOMED Code(s): 846133957


   





(6) Cachexia


Current Visit: Yes   Status: Acute   Code(s): R64 - CACHEXIA   SNOMED Code(s): 

058067505


   





(7) Dysphagia


Narrative/Plan: 





* History of failed modified barium swallow with various consistencies


* Currently on tube feeds via an open G-tube


Current Visit: Yes   Status: Chronic   Code(s): R13.10 - DYSPHAGIA, UNSPECIFIED 

 SNOMED Code(s): 10194132


   





(8) Hypokalemia


Narrative/Plan: 





* We'll replace and recheck tomorrow


* Initiated potassium replacement


Current Visit: No   Status: Acute   Code(s): E87.6 - HYPOKALEMIA   SNOMED 

Code(s): 53811082


   





(9) History of laryngeal cancer


Current Visit: No   Status: Chronic   Code(s): Z85.21 - PERSONAL HISTORY OF 

MALIGNANT NEOPLASM OF LARYNX   SNOMED Code(s): 646283932


   





(10) History of aspiration pneumonia


Current Visit: Yes   Status: Acute   Code(s): Z87.01 - PERSONAL HISTORY OF 

PNEUMONIA (RECURRENT)   SNOMED Code(s): 999953251383400


   


Plan: 





ACP discussion > 20 minutes 





Family members:  present son Bernardo, brother or sister and niece





Patient diagnoses: Acute on chronic respiratory failure with hypoxia and 

hypercapnia, severe protein energy malnutrition currently on TF via open G-tube 

secondary to the dysphasia and neuromuscular weakness, history of aspiration 

pneumonia, history of laryngeal cancer status post chemo and radiation. 





I discussed with family the patient's extremely poor prognosis regarding the 

patient being in acute on chronic respiratory failure with hypoxia and is now 

currently BiPAP dependent, with right middle lobe and lower lobe atelectasis 

suspected due to mucous plugging versus endobronchial tumor.  With patient's 

declining pulmonary status and low probability of the patient will ever be able 

to be extubated if intubated due to severe neuromuscular weakness in the setting

of severe protein energy malnutrition BMI 11.9.  The patient's family is wanting

to proceed with tracheostomy placement 





CODE STATUS: Currently full code

## 2019-09-29 NOTE — XR
EXAMINATION TYPE: XR chest 1V

 

DATE OF EXAM: 9/29/2019

 

HISTORY: SOB.

 

REFERENCE: Previous study dated 9/28/2019.

 

FINDINGS: A MediPort is in place on the right. There is a left basilic PICC line in place. Tip is in 
the right atrium.

 

Lung volumes prominent. There is bibasilar airspace disease. There are small, bilateral effusions. He
art size is partially obscured.

 

IMPRESSION: 

 

NO SIGNIFICANT INTERVAL CHANGE IN APPEARANCE OF THE CHEST.

## 2019-09-30 LAB
ANION GAP SERPL CALC-SCNC: 4 MMOL/L
BASOPHILS # BLD AUTO: 0 K/UL (ref 0–0.2)
BASOPHILS NFR BLD AUTO: 0 %
BUN SERPL-SCNC: 21 MG/DL (ref 7–17)
CALCIUM SPEC-MCNC: 8.8 MG/DL (ref 8.4–10.2)
CHLORIDE SERPL-SCNC: 101 MMOL/L (ref 98–107)
CO2 SERPL-SCNC: 37 MMOL/L (ref 22–30)
EOSINOPHIL # BLD AUTO: 0 K/UL (ref 0–0.7)
EOSINOPHIL NFR BLD AUTO: 0 %
ERYTHROCYTE [DISTWIDTH] IN BLOOD BY AUTOMATED COUNT: 2.99 M/UL (ref 3.8–5.4)
ERYTHROCYTE [DISTWIDTH] IN BLOOD: 14.5 % (ref 11.5–15.5)
GLUCOSE BLD-MCNC: 113 MG/DL (ref 75–99)
GLUCOSE BLD-MCNC: 116 MG/DL (ref 75–99)
GLUCOSE BLD-MCNC: 121 MG/DL (ref 75–99)
GLUCOSE BLD-MCNC: 124 MG/DL (ref 75–99)
GLUCOSE SERPL-MCNC: 118 MG/DL (ref 74–99)
HCT VFR BLD AUTO: 29.6 % (ref 34–46)
HGB BLD-MCNC: 9.6 GM/DL (ref 11.4–16)
LYMPHOCYTES # SPEC AUTO: 0.1 K/UL (ref 1–4.8)
LYMPHOCYTES NFR SPEC AUTO: 1 %
MCH RBC QN AUTO: 32.1 PG (ref 25–35)
MCHC RBC AUTO-ENTMCNC: 32.4 G/DL (ref 31–37)
MCV RBC AUTO: 99 FL (ref 80–100)
MONOCYTES # BLD AUTO: 0.2 K/UL (ref 0–1)
MONOCYTES NFR BLD AUTO: 2 %
NEUTROPHILS # BLD AUTO: 9.2 K/UL (ref 1.3–7.7)
NEUTROPHILS NFR BLD AUTO: 97 %
PLATELET # BLD AUTO: 470 K/UL (ref 150–450)
POTASSIUM SERPL-SCNC: 4 MMOL/L (ref 3.5–5.1)
SODIUM SERPL-SCNC: 142 MMOL/L (ref 137–145)
WBC # BLD AUTO: 9.5 K/UL (ref 3.8–10.6)

## 2019-09-30 RX ADMIN — ENOXAPARIN SODIUM SCH MG: 40 INJECTION SUBCUTANEOUS at 09:02

## 2019-09-30 RX ADMIN — INSULIN ASPART SCH: 100 INJECTION, SOLUTION INTRAVENOUS; SUBCUTANEOUS at 19:13

## 2019-09-30 RX ADMIN — IPRATROPIUM BROMIDE AND ALBUTEROL SULFATE SCH ML: .5; 3 SOLUTION RESPIRATORY (INHALATION) at 15:53

## 2019-09-30 RX ADMIN — INSULIN ASPART SCH: 100 INJECTION, SOLUTION INTRAVENOUS; SUBCUTANEOUS at 23:48

## 2019-09-30 RX ADMIN — ISODIUM CHLORIDE PRN MG: 0.03 SOLUTION RESPIRATORY (INHALATION) at 05:01

## 2019-09-30 RX ADMIN — PANTOPRAZOLE SODIUM SCH MG: 40 INJECTION, POWDER, FOR SOLUTION INTRAVENOUS at 09:02

## 2019-09-30 RX ADMIN — PIPERACILLIN AND TAZOBACTAM SCH MLS/HR: 3; .375 INJECTION, POWDER, FOR SOLUTION INTRAVENOUS at 06:47

## 2019-09-30 RX ADMIN — INSULIN ASPART SCH: 100 INJECTION, SOLUTION INTRAVENOUS; SUBCUTANEOUS at 12:48

## 2019-09-30 RX ADMIN — MORPHINE SULFATE PRN MG: 4 INJECTION, SOLUTION INTRAMUSCULAR; INTRAVENOUS at 21:42

## 2019-09-30 RX ADMIN — PIPERACILLIN AND TAZOBACTAM SCH MLS/HR: 3; .375 INJECTION, POWDER, FOR SOLUTION INTRAVENOUS at 00:19

## 2019-09-30 RX ADMIN — METHYLPREDNISOLONE SODIUM SUCCINATE SCH MG: 40 INJECTION, POWDER, FOR SOLUTION INTRAMUSCULAR; INTRAVENOUS at 09:02

## 2019-09-30 RX ADMIN — MORPHINE SULFATE PRN MG: 4 INJECTION, SOLUTION INTRAMUSCULAR; INTRAVENOUS at 13:38

## 2019-09-30 RX ADMIN — PIPERACILLIN AND TAZOBACTAM SCH MLS/HR: 3; .375 INJECTION, POWDER, FOR SOLUTION INTRAVENOUS at 23:57

## 2019-09-30 RX ADMIN — METHYLPREDNISOLONE SODIUM SUCCINATE SCH MG: 40 INJECTION, POWDER, FOR SOLUTION INTRAMUSCULAR; INTRAVENOUS at 20:29

## 2019-09-30 RX ADMIN — PIPERACILLIN AND TAZOBACTAM SCH MLS/HR: 3; .375 INJECTION, POWDER, FOR SOLUTION INTRAVENOUS at 15:10

## 2019-09-30 RX ADMIN — INSULIN ASPART SCH: 100 INJECTION, SOLUTION INTRAVENOUS; SUBCUTANEOUS at 00:18

## 2019-09-30 RX ADMIN — INSULIN ASPART SCH: 100 INJECTION, SOLUTION INTRAVENOUS; SUBCUTANEOUS at 06:02

## 2019-09-30 RX ADMIN — IPRATROPIUM BROMIDE AND ALBUTEROL SULFATE SCH ML: .5; 3 SOLUTION RESPIRATORY (INHALATION) at 08:28

## 2019-09-30 RX ADMIN — MORPHINE SULFATE PRN MG: 4 INJECTION, SOLUTION INTRAMUSCULAR; INTRAVENOUS at 09:01

## 2019-09-30 RX ADMIN — IPRATROPIUM BROMIDE AND ALBUTEROL SULFATE SCH ML: .5; 3 SOLUTION RESPIRATORY (INHALATION) at 19:21

## 2019-09-30 RX ADMIN — IPRATROPIUM BROMIDE AND ALBUTEROL SULFATE SCH ML: .5; 3 SOLUTION RESPIRATORY (INHALATION) at 11:15

## 2019-09-30 NOTE — P.GSCN
History of Present Illness


Consult date: 19


Reason for Consult: 





trach





Requesting physician: Gentry Sky


History of present illness: 





CHIEF COMPLAINT: trach





HISTORY OF PRESENT ILLNESS: 54 year old female known to surgical services from 

recent open G tube placement with Dr. Wright. Patient was discharged and has 

been re-admitted for hypoxia. General surgery was consulted for tracheostomy. 

Family at bedside and states that patient has been tolerating tube feedings well

without difficulty. She denies nausea or vomiting. Denies abdominal pain. 





PAST MEDICAL HISTORY: 


See list.





PAST SURGICAL HISTORY: 


See list.





SOCIAL HISTORY: No illicit drug use.  





REVIEW OF SYSTEMS: 


CONSTITUTIONAL: Denies fever or chills.


HEENT: Denies blurred vision, vision changes, or eye pain. Denies hemoptysis 


CARDIOVASCULAR: Denies chest pain or pressure.


RESPIRATORY: Reports shortness of breath. 


GASTROINTESTINAL: Refer to [facility] for pertinent findings


HEMATOLOGIC: Denies bleeding disorders.


GENITOURINARY:  Denies any blood in urine.


SKIN: Denies pruitis. Denies rash.





PHYSICAL EXAM: 


VITAL SIGNS: Reviewed.


GENERAL: Well-developed in no acute distress.  Cachectic. Wearing Bipap.


HEENT:  No sclera icterus. Extraocular movements grossly intact.  Moist buccal 

mucosa. Head is atraumatic, normocephalic. 


ABDOMEN:  Soft.  Nondistended. Nontender. G tube noted with tube feedings 

infusing


NEUROLOGIC: Alert and oriented. Cranial nerves II through XII grossly intact.





LABORATORY DATA:


WBC 9.5. Hemoglobin 9.6.





ASSESSMENT: 


1.  Acute on chronic hypoxic respiratory failure


2.  Severe protein calorie malnutrition, s/p recent open G tube placement





PLAN: 


Continue tube feedings at this time


Patient and family agreeable to Trach


Will discuss further with Dr. Wright regarding timing of Trach. Possibly to be

performed tomorrow or Wednesday





Nurse practitioner note has been reviewed by physician. Signing provider agrees 

with the documented findings, assessment, and plan of care. 








Past Medical History


Past Medical History: Cancer


Additional Past Medical History / Comment(s): Pt recently admitted to [facility] on 

19 with severe protein calorie malnourishment, acute respiratory failure 

with hypoxia and hypercapnia was intubated/vented, also had mediatinal 

lymphadenopathy, nyponatremia and hypokalemia.    Other Hx:  Laryngeal cancer 

treated with chemo/radiation 2019, dysphagia-NPO/has G tube.


History of Any Multi-Drug Resistant Organisms: None Reported


Past Surgical History: Tubal Ligation


Additional Past Surgical History / Comment(s): Open G tube placement, mediport, 

picc line, laryngoscopy, one fallopian tube removed d/t ectopic pregnancy.


Past Anesthesia/Blood Transfusion Reactions: No Reported Reaction


Past Psychological History: Anxiety


Additional Psychological History / Comment(s): Pt just placed in [facility] on 19.  She is up with a walker.  She is NPO and has a G tube for 

feedings.


Smoking Status: Former smoker


Past Alcohol Use History: None Reported


Additional Past Alcohol Use History / Comment(s): Pt started smoking in  and

quit 5 months ago-2019.


Past Drug Use History: None Reported





- Past Family History


  ** Mother


Family Medical History: Coronary Artery Disease (CAD), Diabetes Mellitus





  ** Father


Family Medical History: COPD, CVA/TIA, Myocardial Infarction (MI)


Additional Family Medical History / Comment(s): Father  of a MI at the age 

of 54 yrs.





Medications and Allergies


                                Home Medications











 Medication  Instructions  Recorded  Confirmed  Type


 


LORazepam [Ativan] 0.5 mg PO Q8H PRN 19 History








                                    Allergies











Allergy/AdvReac Type Severity Reaction Status Date / Time


 


Beef Containing Products AdvReac  No reaction Verified 19 11:40





[Beef]     


 


Fish Containing Products AdvReac  No reaction Verified 19 11:40





[Fish]     


 


Pork/Porcine Containing AdvReac  No reaction Verified 19 11:40





Products     





[Pork]     














Surgical - Exam


                                   Vital Signs











Temp Pulse Resp Pulse Ox


 


 97.3 F L  113 H  24   94 L


 


 19 11:20  19 11:20  19 11:20  19 11:20














Results





- Labs





                                 19 04:00





                                 19 04:04


                  Abnormal Lab Results - Last 24 Hours (Table)











  19 Range/Units





  17:26 23:56 04:00 


 


RBC    2.99 L  (3.80-5.40)  m/uL


 


Hgb    9.6 L  (11.4-16.0)  gm/dL


 


Hct    29.6 L  (34.0-46.0)  %


 


Plt Count    470 H  (150-450)  k/uL


 


Neutrophils #    9.2 H  (1.3-7.7)  k/uL


 


Lymphocytes #    0.1 L  (1.0-4.8)  k/uL


 


Carbon Dioxide     (22-30)  mmol/L


 


BUN     (7-17)  mg/dL


 


Creatinine     (0.52-1.04)  mg/dL


 


Glucose     (74-99)  mg/dL


 


POC Glucose (mg/dL)  103 H  111 H   (75-99)  mg/dL














  19 Range/Units





  04:04 06:00 


 


RBC    (3.80-5.40)  m/uL


 


Hgb    (11.4-16.0)  gm/dL


 


Hct    (34.0-46.0)  %


 


Plt Count    (150-450)  k/uL


 


Neutrophils #    (1.3-7.7)  k/uL


 


Lymphocytes #    (1.0-4.8)  k/uL


 


Carbon Dioxide  37 H   (22-30)  mmol/L


 


BUN  21 H   (7-17)  mg/dL


 


Creatinine  0.23 L   (0.52-1.04)  mg/dL


 


Glucose  118 H   (74-99)  mg/dL


 


POC Glucose (mg/dL)   116 H  (75-99)  mg/dL








                      Microbiology - Last 24 Hours (Table)











 19 12:40 Blood Culture - Preliminary





 Blood    No Growth after 72 hours








                                 Diabetes panel











  19 Range/Units





  04:04 


 


Sodium  142  (137-145)  mmol/L


 


Potassium  4.0  (3.5-5.1)  mmol/L


 


Chloride  101  ()  mmol/L


 


Carbon Dioxide  37 H  (22-30)  mmol/L


 


BUN  21 H  (7-17)  mg/dL


 


Creatinine  0.23 L  (0.52-1.04)  mg/dL


 


Glucose  118 H  (74-99)  mg/dL


 


Calcium  8.8  (8.4-10.2)  mg/dL








                                  Calcium panel











  19 Range/Units





  04:04 


 


Calcium  8.8  (8.4-10.2)  mg/dL








                                 Pituitary panel











  19 Range/Units





  04:04 


 


Sodium  142  (137-145)  mmol/L


 


Potassium  4.0  (3.5-5.1)  mmol/L


 


Chloride  101  ()  mmol/L


 


Carbon Dioxide  37 H  (22-30)  mmol/L


 


BUN  21 H  (7-17)  mg/dL


 


Creatinine  0.23 L  (0.52-1.04)  mg/dL


 


Glucose  118 H  (74-99)  mg/dL


 


Calcium  8.8  (8.4-10.2)  mg/dL








                                  Adrenal panel











  19 Range/Units





  04:04 


 


Sodium  142  (137-145)  mmol/L


 


Potassium  4.0  (3.5-5.1)  mmol/L


 


Chloride  101  ()  mmol/L


 


Carbon Dioxide  37 H  (22-30)  mmol/L


 


BUN  21 H  (7-17)  mg/dL


 


Creatinine  0.23 L  (0.52-1.04)  mg/dL


 


Glucose  118 H  (74-99)  mg/dL


 


Calcium  8.8  (8.4-10.2)  mg/dL

## 2019-09-30 NOTE — P.PN
Subjective


Progress Note Date: 09/30/19


Principal diagnosis: 





Acute on chronic hypoxic and hypercapnic respiratory failure








 This is a 54-year-old female patient of Dr. Jerome, who was recently 

hospitalized for acute respiratory failure secondary to aspiration pneumonia, 

hypoxemic and hypercapnic requiring intubation and placement on mechanical 

ventilation.  Patient was successfully extubated, was treated with antibiotics, 

completed a course of Levaquin and Zosyn.  Patient showed significant clinical 

and radiographic improvement, by the time of her discharge to the [facility].  Patient 

also has history of laryngeal cancer with previous chemoradiation, and had 

evidence of severe malnutrition.  In addition patient failed modified barium 

swallow, and was having evidence of aspiration with all consistencies.  Patient 

also had evidence of electrolyte abnormality with hyponatremia and hypokalemia, 

which improved with treatment.  Patient had a gastrostomy tube placed for 

nutritional support, and she was strict NPO.  Sputum cultures from previous 

admission were negative.  Other medical history includes chronic anemia of 

chronic disease, previous tobacco dependence.  Patient was discharged to the 

[facility] on 09/25/2019 however she started experiencing shortness 

of breath at the [facility], and was apparently hypoxic and was brought back for 

evaluation on oral 09/26/2019.  He denied any chest pain, she denies any oral 

intake at the nursing home home, she states she was getting her nutrition 

strictly through the gastrostomy tube.  No aspiration, no fever or chills, chest

x-ray showed basilar atelectasis and associated pleural effusions.  Lab work 

showed white blood cell count of 14.0, hemoglobin of 11, correlation profile was

within normal limits, blood gas showed pO2 of 64, pCO2 of 67, and pH of 7.32.  

Sodium was 140, potassium is 4.0, chloride is 98, CO2 37, B1 is 21, creatinine 

0.24.  O2 sat in the emergency department was ranging from 84-86 patient was 

placed on supplemental oxygen.  Patient was given IV steroids, nebulized 

bronchodilators, and worsening the patient in consultation for shortness of 

breath.  She is awake and alert, she is sitting up in bed, appears to be in no 

acute distress, currently on 2 L of oxygen, no significant wheezing, or 

congestion, she does have a weak cough, which does not sound congested, lung 

sounds are diminished, no rales auscultated.  Follow-up blood work was reviewed 

today, white blood cell count is 15.5, hemoglobin is 9.7, repeat blood gas 

showed pO2 of 60, pCO2 59, and pH of 7.39 this was done on FiO2 28%.  Patient 

looks extremely cachectic and weak, her voice is very weak and hoarse, but she 

appears to be in no acute distress.





On 09/28/2019 I'm seeing this patient for a follow-up.  This is an extremely 

debilitated and severely malnourished female patient who is post respiratory 

failure who was readmitted for ongoing difficulties in breathing.  Noted the 

patient's BMI is 11.9 and she has kwashiorkor.  The patient got moved yesterday 

to the intensive care unit because of worsening shortness of breath and altered 

mentation.  Note that on the floor the patient had a blood gas showing a pH of  

0.28 with a pCO2 of 83 and pO2 of 72.  This was on FiO2 of 32%.  She was having 

increased tachypnea and respiratory distress.  At that point the patient got 

transferred to the intensive care unit.  I was very hesitant intubated this 

patient based on an underlying condition and status.  Note that the patient is 

extremely frail and has absolutely no muscle mass and intubation may be quite 

devastating for this patient had the patient accordingly was placed on a BiPAP 

at a pressure of 10/5 cm of water.  She was also given some Ativan and morphine 

which.  Much calm that down.  Her subsequent blood gas showed a pH of 7.38 with 

a pCO2 of 69 a pO2 of 62.  I realized the patient's respiratory insufficiency he

had it performed a CAT scan of the chest yesterday and the CAT scan showed 

complete occlusion of the distal bronchus intermedius and right middle lobe 

atelectasis.  There was also atelectatic changes in lung bases bilaterally with 

a right middle lobe atelectasis right lower lobe atelectasis and bilateral 

pleural effusions.  Upper lobes are quite patent and well hydrated.  The patient

was started back on IV Zosyn.  She did become hypotensive briefly and she was 

given a liter of IV fluid bolus and currently she is on normal saline at rate of

100 mL an hour.  She is improved her blood pressure and she did not require any 

pressors.  She is currently awake.  She is following commands.  However, she has

a very poor insight on her condition.  She is not aware that she is quite 

debilitated in [place] major disadvantage for recovering from her chronic 

illness.  The patient also is receiving enteral feeding for nutritional support 

in the form of vitamin 1.2 which is currently at goal.  I added this patient IV 

Zosyn.  She is on Lovenox for DVT prophylaxis.  CAT scan of the chest also 

showed emphysema bilaterally.  She is on bronchodilators.  She is also on IV 

Solu-Medrol.  IV Protonix for GI prophylaxis.  





On 09/29/2019, the patient is being seen in follow-up she is awake and alert.  

She is following commands.  She is on a BiPAP at a pressure of 10/5 cm of water 

with an FiO2 of 40%.  The patient is very much compliant and tolerating her 

BiPAP.  No significant cough or sputum production.  The chest x-ray from today 

shows small bilateral pleural effusion, right middle lobe/right lower lobe a

telectasis.  Her pro-calcitonin level was low.  She is covered with IV Zosyn.  

She is also covered with IV Solu-Medrol and the dose was reduced down to 40 mg 

every 12 hours.  He is on DuoNeb nebulized treatments around the clock and she 

is receiving enteral feeding for nutritional support.  Sodium level is up to 

147.  Atelectatic discussion with the patient's family.  The family wants 

everything done including the possibility of long-term vent support.  In that 

case, it made recommendations to proceed with a tracheostomy and the patient is 

unable to breathe independently without any form of respiratory  assisting 

devices and the patient is quite debilitated and has significant malnutrition a

nd neuromuscular weakness.  





Patient was reevaluated today on 9/30/2019, patient remains on BiPAP, presently 

at a pressure of 10/5 and FiO2 of 40%.  She seems to be almost BiPAP dependent. 

Her chest x-ray is showing significant worsening of the right lower lobe and 

right lobe collapse and atelectasis, there is also some component of pleural 

effusion.  Remains on antibiotics in the form of Zosyn, patient is on 

bronchodilators is also on IV Solu-Medrol.  Not much of a change over the last 

24 hours.  Apparently the patient is to be seen by surgery today, and Dr. Liu recommended tracheostomy which will likely be done tomorrow or in the 

next couple of days.  Patient will be bronchoscoped after tracheostomy and 

mechanical ventilation.  And she read he has a PEG tube in place.  Presently the

patient is about the same, and not much change over the last 24 hours.  WBC 

count is 9.5 hemoglobin 9.6 electrolytes are normal bicarb is 37 renal 

functioning is normal.  Patient was already seen by general surgery, and surgery

is being considered for either tomorrow or Wednesday.








Objective





- Vital Signs


Vital signs: 


                                   Vital Signs











Temp  97.1 F L  09/30/19 08:00


 


Pulse  98   09/30/19 10:00


 


Resp  43 H  09/30/19 10:00


 


BP  113/85   09/30/19 10:00


 


Pulse Ox  97   09/30/19 10:00








                                 Intake & Output











 09/29/19 09/30/19 09/30/19





 18:59 06:59 18:59


 


Intake Total 1580 1400 30


 


Output Total 2955 805 200


 


Balance -1375 595 -170


 


Weight  36 kg 36 kg


 


Intake:   


 


   320 30


 


    .9 70 120 30


 


    Piperacillin-Tazobactam 3 200 200 





    .375 gm In Sodium   





    Chloride 0.9% 100 ml @ 25   





    mls/hr IVPB Q8H [facility] Rx#:   





    [id number]   


 


    Potassium Chloride 20 meq 400  





    In Water For Injection 1   





    100ml.bag @ 50 mls/hr   





    IVPB Q2H [facility] Rx#:   





    [id number]   


 


  Tube Feeding 480 480 


 


  Other 430 600 


 


Output:   


 


  Urine 2955 805 200


 


Other:   


 


  Voiding Method Indwelling Catheter Indwelling Catheter Indwelling Catheter














- Exam





Physical Exam: Revealed a 54-year-old female, looks cachectic, debilitated, BMI 

is 11.9, significant loss of muscle mass, major atrophy of upper and lower 

extremities on BiPAP.


Head: Atraumatic, normocephalic.  Looks cachectic, on BiPAP.


HEENT:[Neck is supple.] [No neck masses.] [No thyromegaly.] [No JVD.]  PERRLA, 

EOMI, no icterus.


Chest: Extremely diminished breath sounds at the bases especially at the right 

base no crackles, no rhonchi, no wheezes.  Symmetrical chest expansion.  

Significant wasting noted of the chest muscles.


Cardiac Exam: [Normal S1 and S2, no S3 gallop, no murmur.]


Abdomen: [Soft, nontender,  no megaly, no rebound, no guarding, normal bowel 

sounds.]


Extremities: [No clubbing, no edema, no cyanosis.]


Neurological Exam: [No focal neurologic deficit.]  Alert and oriented 3.


Psychiatric: Normal mood affect and normal mental status examination.


Skin: No rashes.


Spine: No scoliosis.


Musculoskeletal: Severe muscle atrophy noted throughout.





- Labs


CBC & Chem 7: 


                                 09/30/19 04:00





                                 09/30/19 04:04


Labs: 


                  Abnormal Lab Results - Last 24 Hours (Table)











  09/29/19 09/29/19 09/30/19 Range/Units





  17:26 23:56 04:00 


 


RBC    2.99 L  (3.80-5.40)  m/uL


 


Hgb    9.6 L  (11.4-16.0)  gm/dL


 


Hct    29.6 L  (34.0-46.0)  %


 


Plt Count    470 H  (150-450)  k/uL


 


Neutrophils #    9.2 H  (1.3-7.7)  k/uL


 


Lymphocytes #    0.1 L  (1.0-4.8)  k/uL


 


Carbon Dioxide     (22-30)  mmol/L


 


BUN     (7-17)  mg/dL


 


Creatinine     (0.52-1.04)  mg/dL


 


Glucose     (74-99)  mg/dL


 


POC Glucose (mg/dL)  103 H  111 H   (75-99)  mg/dL














  09/30/19 09/30/19 Range/Units





  04:04 06:00 


 


RBC    (3.80-5.40)  m/uL


 


Hgb    (11.4-16.0)  gm/dL


 


Hct    (34.0-46.0)  %


 


Plt Count    (150-450)  k/uL


 


Neutrophils #    (1.3-7.7)  k/uL


 


Lymphocytes #    (1.0-4.8)  k/uL


 


Carbon Dioxide  37 H   (22-30)  mmol/L


 


BUN  21 H   (7-17)  mg/dL


 


Creatinine  0.23 L   (0.52-1.04)  mg/dL


 


Glucose  118 H   (74-99)  mg/dL


 


POC Glucose (mg/dL)   116 H  (75-99)  mg/dL








                      Microbiology - Last 24 Hours (Table)











 09/26/19 12:40 Blood Culture - Preliminary





 Blood    No Growth after 72 hours














Assessment and Plan


Assessment: 





Impression:





1 acute on chronic hypoxic respiratory failure





2 severe underlying COPD





3 history of laryngeal cancer and previous chemoradiation therapy





4 severe protein calorie malnutrition BMI of 11.9





5 chronic smoking





6 chronic anemia of chronic disease





7 right lower lobe and right middle lobe collapse, may consider bronchoscopy on 

this patient after tracheostomy and mechanical ventilation, patient is not a 

great candidate for bronchoscopy at this point since her pulmonary status is 

marginal and she would likely decompensated and required intubation during the 

bronchoscopy.





8 chronic hypoxic and hypercapnic respiratory failure, requiring BiPAP, and the 

patient is BiPAP dependent.





Recommendation: Continue bronchodilators, continue empiric antibiotics, patient 

will be scheduled for tracheostomy in the next 24-48 hours, will bronchoscope 

the patient post tracheostomy.  Continue enteral feeding via PEG tube, continue 

GI and DVT prophylaxis, had a long discussion with the patient regarding her 

overall pulmonary status and nutritional status and the patient is agreeable to 

go ahead and proceed with tracheostomy and scheduled either tomorrow or after 

tomorrow.  We'll discuss her condition with surgeons on the case.  Overall 

prognosis remains extremely poor and guarded.  We'll continue to follow.

















Time with Patient: Less than 30

## 2019-09-30 NOTE — P.PN
Subjective


Progress Note Date: 09/30/19


Principal diagnosis: 





Acute on chronic hypoxic respiratory failure





Patient was seen and examined.  No acute events overnight.  Family is at 

bedside.  When asked how she is feeling, patient gives a thumbs up.  She has no 

complaints at this time.  She appears very agreeable for tracheostomy at this 

time.  Tube feeds are running at 40 mL per hour.  She is slightly tachycardic 

with a heart rate in the high 90s.  Currently on BiPAP 10/5 FiO2 40%.  Chest x-

ray this morning shows bilateral infiltrate and pleural effusion, stable.





Objective





- Vital Signs


Vital signs: 


                                   Vital Signs











Temp  97.1 F L  09/30/19 08:00


 


Pulse  98   09/30/19 10:00


 


Resp  43 H  09/30/19 10:00


 


BP  113/85   09/30/19 10:00


 


Pulse Ox  97   09/30/19 10:00








                                 Intake & Output











 09/29/19 09/30/19 09/30/19





 18:59 06:59 18:59


 


Intake Total 1580 1400 30


 


Output Total 2955 805 200


 


Balance -1375 595 -170


 


Weight  36 kg 


 


Intake:   


 


   320 30


 


    .9 70 120 30


 


    Piperacillin-Tazobactam 3 200 200 





    .375 gm In Sodium   





    Chloride 0.9% 100 ml @ 25   





    mls/hr IVPB Q8H [facility] Rx#:   





    [id number]   


 


    Potassium Chloride 20 meq 400  





    In Water For Injection 1   





    100ml.bag @ 50 mls/hr   





    IVPB Q2H [facility] Rx#:   





    [id number]   


 


  Tube Feeding 480 480 


 


  Other 430 600 


 


Output:   


 


  Urine 2955 805 200


 


Other:   


 


  Voiding Method Indwelling Catheter Indwelling Catheter Indwelling Catheter














- Exam





General: [Cachectic], [no distress, on BiPAP], [appears at stated age]


Derm: [warm], [dry]


Head: [atraumatic], [normocephalic], [bitemporal wasting]


Eyes: [EOMI], [no lid lag], [anicteric sclera]


Mouth: [no lip lesion], [mucus membranes moist]


Cardiovascular: [S1S2 reg], [tachycardic], [positive DP pulse bilateral], 


Lungs: [Decreased breath sounds bilateral], [no rhonchi, no rales] , [no 

accessory muscle use]


Abdominal: [soft], [ nontender to palpation], [no guarding], [Anderson catheter in 

place]


Ext: [no gross muscle atrophy], [no edema], [no contractures]


Neuro: [no focal neuro deficits]


Psych: [Alert], [oriented], [appropriate affect] 





- Labs


CBC & Chem 7: 


                                 09/30/19 04:00





                                 09/30/19 04:04


Labs: 


                  Abnormal Lab Results - Last 24 Hours (Table)











  09/29/19 09/29/19 09/30/19 Range/Units





  17:26 23:56 04:00 


 


RBC    2.99 L  (3.80-5.40)  m/uL


 


Hgb    9.6 L  (11.4-16.0)  gm/dL


 


Hct    29.6 L  (34.0-46.0)  %


 


Plt Count    470 H  (150-450)  k/uL


 


Neutrophils #    9.2 H  (1.3-7.7)  k/uL


 


Lymphocytes #    0.1 L  (1.0-4.8)  k/uL


 


Carbon Dioxide     (22-30)  mmol/L


 


BUN     (7-17)  mg/dL


 


Creatinine     (0.52-1.04)  mg/dL


 


Glucose     (74-99)  mg/dL


 


POC Glucose (mg/dL)  103 H  111 H   (75-99)  mg/dL














  09/30/19 09/30/19 Range/Units





  04:04 06:00 


 


RBC    (3.80-5.40)  m/uL


 


Hgb    (11.4-16.0)  gm/dL


 


Hct    (34.0-46.0)  %


 


Plt Count    (150-450)  k/uL


 


Neutrophils #    (1.3-7.7)  k/uL


 


Lymphocytes #    (1.0-4.8)  k/uL


 


Carbon Dioxide  37 H   (22-30)  mmol/L


 


BUN  21 H   (7-17)  mg/dL


 


Creatinine  0.23 L   (0.52-1.04)  mg/dL


 


Glucose  118 H   (74-99)  mg/dL


 


POC Glucose (mg/dL)   116 H  (75-99)  mg/dL








                      Microbiology - Last 24 Hours (Table)











 09/26/19 12:40 Blood Culture - Preliminary





 Blood    No Growth after 72 hours














Assessment and Plan


Assessment: 





Assessment and Plan





Acute on chronic hypoxic respiratory failure with hypercapnia due to laryngeal 

cancer, pleural effusion and COPD


Severe protein calorie malnutrition with cachexia with G-tube due to dysphagia 

BMI 12.4


Mediastinal lymphadenopathy


Anemia with thrombocytosis








Chest CT shows increased pleural effusion, right middle lobe pneumonia versus 

atelectasis.  Pro-calcitonin negative, low concerns for pneumonia.  Family 

agreeable for tracheostomy.  Plans: Lasix 40 mg IV 1 time dose today for pleural

effusion.  Surgery consulted for possible trach to be done in the next 1-2 days.

 Continue BiPAP to maintain O2 saturation greater than 92%.  Continue Zosyn for 

empiric treatment of pneumonia and aspiration.  Continue Solu-Medrol 40 mg IV 

twice a day along with DuoNeb as needed for shortness of breath and wheezing for

treatment of COPD.  Telemetry monitoring.  Head of bed elevation.  Pulmonology 

and general surgery on board.





BMI 12.4.  Plans: Continue G-tube feeds.  Consult dietitian.





As seen on previous CT chest.  Plans: Will likely need bronchoscopy with 

pulmonology after trach.  Follow pulmonology recommendations.





Hemoglobin 9.6.  Macrocytic during admission.  Thrombocytosis of 470 likely from

iron deficiency.  Plans: Hemoglobin stable.  Transfuse if hemoglobin less than 

7.  Follow iron studies, B12 and folate.





[Patient admitted for acute on chronic hypoxic respiratory failure due to 

multiple reasons.  Plan is for tracheostomy in the next 1-2 days.  Will discuss 

with general surgery and pulmonology.  Patient is pending clinical improvement.]

## 2019-09-30 NOTE — P.PN
Subjective


Progress Note Date: 09/28/19





The patient says that we will follow-up today, patient's brother at bedside.  

Patient currently on BiPAP, the patient transferred to ICU after having episodes

of respiratory distress with tachypnea and desaturation and altered mentation.  

CAT scan performed yesterday indicating occlusion of the distal bronchus 

intermedius and right middle lobe atelectasis with right lower lobe atelectasis 

and bilateral pleural effusions.  Patient was started on Zosyn, had episode of 

hypotension that responded to fluids. 





Objective





- Vital Signs


Vital signs: 


                                   Vital Signs











Temp  99.1 F   09/28/19 12:00


 


Pulse  109 H  09/28/19 12:00


 


Resp  38 H  09/28/19 12:00


 


BP  110/82   09/28/19 12:00


 


Pulse Ox  94 L  09/28/19 12:00








                                 Intake & Output











 09/27/19 09/28/19 09/28/19





 18:59 06:59 18:59


 


Intake Total 1000 2140 650


 


Output Total 600 1065 485


 


Balance 400 1075 165


 


Weight 34.02 kg 34.5 kg 


 


Intake:   


 


  IV  1800 650


 


    Piperacillin-Tazobactam 3  100 100





    .375 gm In Sodium   





    Chloride 0.9% 100 ml @ 25   





    mls/hr IVPB Q8H [facility] Rx#:   





    [id number]   


 


    Potassium Chloride 20 meq   200





    In Water For Injection 1   





    100ml.bag @ 50 mls/hr   





    IVPB Q2H [facility] Rx#:   





    [id number]   


 


    Sodium Chloride 0.9% 1,  1700 350





    000 ml @ 999 mls/hr IV .   





    Q1H1[facility] Rx#:[id number]   


 


  Intake, IV Titration 700  





  Amount   


 


    Sodium Chloride 0.9% 1, 700  





    000 ml @ 100 mls/hr IV .   





    Q10H [facility] Rx#:[id number]   


 


  Tube Feeding 210 280 


 


  Other 90 60 


 


Output:   


 


  Urine 600 1065 485


 


Other:   


 


  Voiding Method Diaper Indwelling Catheter 














- Exam





Constitutional: Moderate respiratory, awake and alert cachectic appearance





Eyes: Anicteric sclerae, moist conjunctiva, no lid-lag, PERRLA





ENMT: Bilateral temporal wasting, 





 NC/AT,Oropharynx clear, no erythema, exudates





Neck:Supple, FROM, no masses, or JVD, No carotid bruits; No thyromegaly





Lungs: Decreased breath sounds bilaterally, currently on BiPAP





Cardiovascular: Heart regular in rate and rhythm,  No murmurs, gallops, or rubs 

no peripheral edema





Abdominal: Soft Nontender, nom distended, no guarding, no rebound or  rigidity, 

Normoactive bowel sounds No hepatomegaly, No splenomegaly,  No palpable mass No 

abdominal wall hernia noted 





Skin: Normal temperature, tone, texture, turgor, No induration No subcutaneous 

nodules, No rash, lesions, No ulcers





Extremities:No digital cyanosis No clubbing, Pedal pulses intact and  

symmetrical Radial pulses intact and symmetrical Normal gait and station, No 

calf tenderness   


      


Neuro: Awake alert following commands no focal deficits appreciated








- Labs


CBC & Chem 7: 


                                 09/28/19 05:36





                                 09/28/19 05:36


Labs: 


                  Abnormal Lab Results - Last 24 Hours (Table)











  09/27/19 09/27/19 09/27/19 Range/Units





  16:00 16:32 20:03 


 


RBC     (3.80-5.40)  m/uL


 


Hgb     (11.4-16.0)  gm/dL


 


Hct     (34.0-46.0)  %


 


MCV     (80.0-100.0)  fL


 


Plt Count     (150-450)  k/uL


 


Neutrophils #     (1.3-7.7)  k/uL


 


Lymphocytes #     (1.0-4.8)  k/uL


 


ABG pH    7.28 L  (7.35-7.45)  


 


ABG pCO2    83 H*  (35-45)  mmHg


 


ABG pO2    72 L  ()  mmHg


 


ABG HCO3    39 H  (21-25)  mmol/L


 


ABG Total CO2    42 H  (19-24)  mmol/L


 


ABG O2 Saturation    90.9 L  (94-97)  %


 


Potassium     (3.5-5.1)  mmol/L


 


Carbon Dioxide     (22-30)  mmol/L


 


BUN     (7-17)  mg/dL


 


Creatinine     (0.52-1.04)  mg/dL


 


Glucose     (74-99)  mg/dL


 


POC Glucose (mg/dL)   110 H   (75-99)  mg/dL


 


Urine Appearance  Cloudy H    (Clear)  


 


Urine Protein  Trace H    (Negative)  


 


Ur Leukocyte Esterase  Small H    (Negative)  


 


Urine RBC  11 H    (0-5)  /hpf


 


Urine WBC  16 H    (0-5)  /hpf


 


Hyaline Casts  9 H    (0-2)  /lpf


 


Urine Mucus  Few H    (None)  /hpf


 


Urine Yeast (Budding)  Many H    (None)  /hpf














  09/27/19 09/27/19 09/28/19 Range/Units





  21:16 23:48 05:36 


 


RBC    2.67 L  (3.80-5.40)  m/uL


 


Hgb    8.5 L  (11.4-16.0)  gm/dL


 


Hct    27.2 L  (34.0-46.0)  %


 


MCV    101.8 H  (80.0-100.0)  fL


 


Plt Count    489 H  (150-450)  k/uL


 


Neutrophils #    10.1 H  (1.3-7.7)  k/uL


 


Lymphocytes #    0.1 L  (1.0-4.8)  k/uL


 


ABG pH     (7.35-7.45)  


 


ABG pCO2  69 H    (35-45)  mmHg


 


ABG pO2  62 L    ()  mmHg


 


ABG HCO3  41 H*    (21-25)  mmol/L


 


ABG Total CO2  43 H    (19-24)  mmol/L


 


ABG O2 Saturation  89.9 L    (94-97)  %


 


Potassium     (3.5-5.1)  mmol/L


 


Carbon Dioxide     (22-30)  mmol/L


 


BUN     (7-17)  mg/dL


 


Creatinine     (0.52-1.04)  mg/dL


 


Glucose     (74-99)  mg/dL


 


POC Glucose (mg/dL)   125 H   (75-99)  mg/dL


 


Urine Appearance     (Clear)  


 


Urine Protein     (Negative)  


 


Ur Leukocyte Esterase     (Negative)  


 


Urine RBC     (0-5)  /hpf


 


Urine WBC     (0-5)  /hpf


 


Hyaline Casts     (0-2)  /lpf


 


Urine Mucus     (None)  /hpf


 


Urine Yeast (Budding)     (None)  /hpf














  09/28/19 09/28/19 09/28/19 Range/Units





  05:36 05:57 11:58 


 


RBC     (3.80-5.40)  m/uL


 


Hgb     (11.4-16.0)  gm/dL


 


Hct     (34.0-46.0)  %


 


MCV     (80.0-100.0)  fL


 


Plt Count     (150-450)  k/uL


 


Neutrophils #     (1.3-7.7)  k/uL


 


Lymphocytes #     (1.0-4.8)  k/uL


 


ABG pH     (7.35-7.45)  


 


ABG pCO2     (35-45)  mmHg


 


ABG pO2     ()  mmHg


 


ABG HCO3     (21-25)  mmol/L


 


ABG Total CO2     (19-24)  mmol/L


 


ABG O2 Saturation     (94-97)  %


 


Potassium  3.4 L    (3.5-5.1)  mmol/L


 


Carbon Dioxide  38 H    (22-30)  mmol/L


 


BUN  18 H    (7-17)  mg/dL


 


Creatinine  0.28 L    (0.52-1.04)  mg/dL


 


Glucose  146 H    (74-99)  mg/dL


 


POC Glucose (mg/dL)   153 H  117 H  (75-99)  mg/dL


 


Urine Appearance     (Clear)  


 


Urine Protein     (Negative)  


 


Ur Leukocyte Esterase     (Negative)  


 


Urine RBC     (0-5)  /hpf


 


Urine WBC     (0-5)  /hpf


 


Hyaline Casts     (0-2)  /lpf


 


Urine Mucus     (None)  /hpf


 


Urine Yeast (Budding)     (None)  /hpf








                      Microbiology - Last 24 Hours (Table)











 09/27/19 16:00 Urine Culture - Preliminary





 Urine,Voided 


 


 09/26/19 12:40 Blood Culture - Preliminary





 Blood    No Growth after 24 hours














Assessment and Plan


(1) Acute respiratory failure with hypoxia and hypercapnia


Narrative/Plan: 





* Secondary to distal bronchus obstruction and right middle and lower lobe 

  atelectasis with bilateral pleural effusions, concern for mucous plugging 

  versus in the proximal tumor


* Patient with episodes of respiratory distress with tachypnea desaturations


* Continue noninvasive respiratory support with BiPAP 


* Continue breathing treatments continue systemic steroids


* ABG suggesting hypoxia and hypercapnia


* Patient with low baseline function secondary to severe protein energy 

  malnutrition and anorexia BMI of 11


   * Agree with concerns that patient is a poor candidate for bronchoscopy due 

     to inability to wean off BiPAP and tolerate sedation


   * Attempting to avoid intubation at all costs


   * Pulmonary discussing options with her family    


* Appreciate pulmonary recommendations 


Current Visit: Yes   Status: Resolved   Code(s): J96.01 - ACUTE RESPIRATORY 

FAILURE WITH HYPOXIA; J96.02 - ACUTE RESPIRATORY FAILURE WITH HYPERCAPNIA   

SNOMED Code(s): 597278562


   





(2) Severe protein-energy malnutrition


Narrative/Plan: 





* Continue tube feeds currently at goal


* On vital 1.2 and 40 mL an hour


Current Visit: Yes   Status: Acute   Code(s): E43 - UNSPECIFIED SEVERE PROTEIN-

CALORIE MALNUTRITION   SNOMED Code(s): 852295190


   





(3) Mediastinal lymphadenopathy


Current Visit: Yes   Status: Acute   Code(s): R59.0 - LOCALIZED ENLARGED LYMPH 

NODES   SNOMED Code(s): 57270990


   





(4) Weakness


Narrative/Plan: 





* PT consulted 


Current Visit: Yes   Status: Chronic   Code(s): R53.1 - WEAKNESS   SNOMED 

Code(s): 85930333


   





(5) Leukocytosis


Narrative/Plan: 





* Patient afebrile we'll recheck CBC today


* We'll check urinalysis and blood cultures


Current Visit: Yes   Status: Resolved   Code(s): D72.829 - ELEVATED WHITE BLOOD 

CELL COUNT, UNSPECIFIED   SNOMED Code(s): 010119284


   





(6) Cachexia


Current Visit: Yes   Status: Acute   Code(s): R64 - CACHEXIA   SNOMED Code(s): 

848584133


   





(7) Dysphagia


Narrative/Plan: 





* History of failed modified barium swallow with various consistencies


* Currently on tube feeds via an open G-tube


Current Visit: Yes   Status: Chronic   Code(s): R13.10 - DYSPHAGIA, UNSPECIFIED 

 SNOMED Code(s): 41509070


   





(8) Hypokalemia


Narrative/Plan: 





* We'll replace and recheck tomorrow


* Initiated potassium replacement


Current Visit: No   Status: Acute   Code(s): E87.6 - HYPOKALEMIA   SNOMED 

Code(s): 20728587


   





(9) History of laryngeal cancer


Current Visit: No   Status: Chronic   Code(s): Z85.21 - PERSONAL HISTORY OF 

MALIGNANT NEOPLASM OF LARYNX   SNOMED Code(s): 228104976


   





(10) History of aspiration pneumonia


Current Visit: Yes   Status: Acute   Code(s): Z87.01 - PERSONAL HISTORY OF PN

EUMONIA (RECURRENT)   SNOMED Code(s): 240862037803552


   


Plan: 








* Disposition


   * Extremely poor prognosis due to underlying malnutrition and cachexia


   * Will plan multidisciplinary discussion with family +paranoia defer some scattered thought process, has difficulty explaining certain topics, appears distracted at times

## 2019-09-30 NOTE — XR
EXAMINATION TYPE: XR chest 1V

 

DATE OF EXAM: 9/30/2019

 

COMPARISON: 9/29/2019

 

HISTORY: Shortness of breath

 

TECHNIQUE: Single frontal view of the chest is obtained.

 

FINDINGS:  Bilateral consolidation and pleural effusion greater on the right is stable. Mediport cath
eter noted there is underlying COPD. Biapical pleural thickening noted. Left-sided PICC line noted. S
urgical staples in the midline with a catheter seen overlying the left upper quadrant which may be re
lated to gastrostomy tube.

 

IMPRESSION:  Bilateral infiltrate and pleural effusion. Stable.

## 2019-10-01 LAB
ANION GAP SERPL CALC-SCNC: 1 MMOL/L
BUN SERPL-SCNC: 18 MG/DL (ref 7–17)
CALCIUM SPEC-MCNC: 8.5 MG/DL (ref 8.4–10.2)
CHLORIDE SERPL-SCNC: 99 MMOL/L (ref 98–107)
CO2 BLDA-SCNC: 37 MMOL/L (ref 19–24)
CO2 SERPL-SCNC: 40 MMOL/L (ref 22–30)
ERYTHROCYTE [DISTWIDTH] IN BLOOD BY AUTOMATED COUNT: 3.05 M/UL (ref 3.8–5.4)
ERYTHROCYTE [DISTWIDTH] IN BLOOD: 14.5 % (ref 11.5–15.5)
FERRITIN SERPL-MCNC: 119.3 NG/ML (ref 10–291)
GLUCOSE BLD-MCNC: 87 MG/DL (ref 75–99)
GLUCOSE BLD-MCNC: 88 MG/DL (ref 75–99)
GLUCOSE BLD-MCNC: 92 MG/DL (ref 75–99)
GLUCOSE SERPL-MCNC: 91 MG/DL (ref 74–99)
HCO3 BLDA-SCNC: 36 MMOL/L (ref 21–25)
HCT VFR BLD AUTO: 30 % (ref 34–46)
HGB BLD-MCNC: 9.8 GM/DL (ref 11.4–16)
MCH RBC QN AUTO: 32.1 PG (ref 25–35)
MCHC RBC AUTO-ENTMCNC: 32.7 G/DL (ref 31–37)
MCV RBC AUTO: 98.3 FL (ref 80–100)
PCO2 BLDA: 56 MMHG (ref 35–45)
PH BLDA: 7.41 [PH] (ref 7.35–7.45)
PLATELET # BLD AUTO: 476 K/UL (ref 150–450)
PO2 BLDA: 110 MMHG (ref 83–108)
POTASSIUM SERPL-SCNC: 3.3 MMOL/L (ref 3.5–5.1)
SODIUM SERPL-SCNC: 140 MMOL/L (ref 137–145)
WBC # BLD AUTO: 8 K/UL (ref 3.8–10.6)

## 2019-10-01 PROCEDURE — 5A1955Z RESPIRATORY VENTILATION, GREATER THAN 96 CONSECUTIVE HOURS: ICD-10-PCS

## 2019-10-01 PROCEDURE — 0B110F4 BYPASS TRACHEA TO CUTANEOUS WITH TRACHEOSTOMY DEVICE, OPEN APPROACH: ICD-10-PCS

## 2019-10-01 RX ADMIN — POTASSIUM CHLORIDE SCH MLS/HR: 14.9 INJECTION, SOLUTION INTRAVENOUS at 10:54

## 2019-10-01 RX ADMIN — ISODIUM CHLORIDE PRN MG: 0.03 SOLUTION RESPIRATORY (INHALATION) at 03:47

## 2019-10-01 RX ADMIN — INSULIN ASPART SCH: 100 INJECTION, SOLUTION INTRAVENOUS; SUBCUTANEOUS at 14:45

## 2019-10-01 RX ADMIN — METHYLPREDNISOLONE SODIUM SUCCINATE SCH MG: 40 INJECTION, POWDER, FOR SOLUTION INTRAMUSCULAR; INTRAVENOUS at 23:05

## 2019-10-01 RX ADMIN — IPRATROPIUM BROMIDE AND ALBUTEROL SULFATE SCH ML: .5; 3 SOLUTION RESPIRATORY (INHALATION) at 11:05

## 2019-10-01 RX ADMIN — MORPHINE SULFATE PRN MG: 4 INJECTION, SOLUTION INTRAMUSCULAR; INTRAVENOUS at 23:54

## 2019-10-01 RX ADMIN — PIPERACILLIN AND TAZOBACTAM SCH MLS/HR: 3; .375 INJECTION, POWDER, FOR SOLUTION INTRAVENOUS at 23:05

## 2019-10-01 RX ADMIN — ISODIUM CHLORIDE PRN MG: 0.03 SOLUTION RESPIRATORY (INHALATION) at 23:42

## 2019-10-01 RX ADMIN — IPRATROPIUM BROMIDE AND ALBUTEROL SULFATE SCH ML: .5; 3 SOLUTION RESPIRATORY (INHALATION) at 16:33

## 2019-10-01 RX ADMIN — INSULIN ASPART SCH: 100 INJECTION, SOLUTION INTRAVENOUS; SUBCUTANEOUS at 07:45

## 2019-10-01 RX ADMIN — IPRATROPIUM BROMIDE AND ALBUTEROL SULFATE SCH ML: .5; 3 SOLUTION RESPIRATORY (INHALATION) at 19:54

## 2019-10-01 RX ADMIN — POTASSIUM CHLORIDE SCH MLS/HR: 14.9 INJECTION, SOLUTION INTRAVENOUS at 08:07

## 2019-10-01 RX ADMIN — PIPERACILLIN AND TAZOBACTAM SCH MLS/HR: 3; .375 INJECTION, POWDER, FOR SOLUTION INTRAVENOUS at 08:07

## 2019-10-01 RX ADMIN — METHYLPREDNISOLONE SODIUM SUCCINATE SCH MG: 40 INJECTION, POWDER, FOR SOLUTION INTRAMUSCULAR; INTRAVENOUS at 08:08

## 2019-10-01 RX ADMIN — PROPOFOL SCH MLS/HR: 10 INJECTION, EMULSION INTRAVENOUS at 12:35

## 2019-10-01 RX ADMIN — ENOXAPARIN SODIUM SCH: 40 INJECTION SUBCUTANEOUS at 11:04

## 2019-10-01 RX ADMIN — IPRATROPIUM BROMIDE AND ALBUTEROL SULFATE SCH ML: .5; 3 SOLUTION RESPIRATORY (INHALATION) at 07:45

## 2019-10-01 RX ADMIN — PANTOPRAZOLE SODIUM SCH MG: 40 INJECTION, POWDER, FOR SOLUTION INTRAVENOUS at 08:07

## 2019-10-01 RX ADMIN — INSULIN ASPART SCH: 100 INJECTION, SOLUTION INTRAVENOUS; SUBCUTANEOUS at 19:12

## 2019-10-01 RX ADMIN — PIPERACILLIN AND TAZOBACTAM SCH MLS/HR: 3; .375 INJECTION, POWDER, FOR SOLUTION INTRAVENOUS at 14:45

## 2019-10-01 NOTE — P.PN
Subjective


Progress Note Date: 10/01/19


Principal diagnosis: 





Acute on chronic hypoxic and hypercapnic respiratory failure








 This is a 54-year-old female patient of Dr. Jerome, who was recently 

hospitalized for acute respiratory failure secondary to aspiration pneumonia, 

hypoxemic and hypercapnic requiring intubation and placement on mechanical 

ventilation.  Patient was successfully extubated, was treated with antibiotics, 

completed a course of Levaquin and Zosyn.  Patient showed significant clinical 

and radiographic improvement, by the time of her discharge to the [facility].  Patient 

also has history of laryngeal cancer with previous chemoradiation, and had 

evidence of severe malnutrition.  In addition patient failed modified barium 

swallow, and was having evidence of aspiration with all consistencies.  Patient 

also had evidence of electrolyte abnormality with hyponatremia and hypokalemia, 

which improved with treatment.  Patient had a gastrostomy tube placed for 

nutritional support, and she was strict NPO.  Sputum cultures from previous 

admission were negative.  Other medical history includes chronic anemia of 

chronic disease, previous tobacco dependence.  Patient was discharged to the 

[facility] on 09/25/2019 however she started experiencing shortness 

of breath at the [facility], and was apparently hypoxic and was brought back for 

evaluation on oral 09/26/2019.  He denied any chest pain, she denies any oral 

intake at the nursing home home, she states she was getting her nutrition 

strictly through the gastrostomy tube.  No aspiration, no fever or chills, chest

x-ray showed basilar atelectasis and associated pleural effusions.  Lab work 

showed white blood cell count of 14.0, hemoglobin of 11, correlation profile was

within normal limits, blood gas showed pO2 of 64, pCO2 of 67, and pH of 7.32.  

Sodium was 140, potassium is 4.0, chloride is 98, CO2 37, B1 is 21, creatinine 

0.24.  O2 sat in the emergency department was ranging from 84-86 patient was 

placed on supplemental oxygen.  Patient was given IV steroids, nebulized 

bronchodilators, and worsening the patient in consultation for shortness of 

breath.  She is awake and alert, she is sitting up in bed, appears to be in no 

acute distress, currently on 2 L of oxygen, no significant wheezing, or 

congestion, she does have a weak cough, which does not sound congested, lung 

sounds are diminished, no rales auscultated.  Follow-up blood work was reviewed 

today, white blood cell count is 15.5, hemoglobin is 9.7, repeat blood gas 

showed pO2 of 60, pCO2 59, and pH of 7.39 this was done on FiO2 28%.  Patient 

looks extremely cachectic and weak, her voice is very weak and hoarse, but she 

appears to be in no acute distress.





On 09/28/2019 I'm seeing this patient for a follow-up.  This is an extremely 

debilitated and severely malnourished female patient who is post respiratory 

failure who was readmitted for ongoing difficulties in breathing.  Noted the 

patient's BMI is 11.9 and she has kwashiorkor.  The patient got moved yesterday 

to the intensive care unit because of worsening shortness of breath and altered 

mentation.  Note that on the floor the patient had a blood gas showing a pH of  

0.28 with a pCO2 of 83 and pO2 of 72.  This was on FiO2 of 32%.  She was having 

increased tachypnea and respiratory distress.  At that point the patient got 

transferred to the intensive care unit.  I was very hesitant intubated this 

patient based on an underlying condition and status.  Note that the patient is 

extremely frail and has absolutely no muscle mass and intubation may be quite 

devastating for this patient had the patient accordingly was placed on a BiPAP 

at a pressure of 10/5 cm of water.  She was also given some Ativan and morphine 

which.  Much calm that down.  Her subsequent blood gas showed a pH of 7.38 with 

a pCO2 of 69 a pO2 of 62.  I realized the patient's respiratory insufficiency he

had it performed a CAT scan of the chest yesterday and the CAT scan showed 

complete occlusion of the distal bronchus intermedius and right middle lobe 

atelectasis.  There was also atelectatic changes in lung bases bilaterally with 

a right middle lobe atelectasis right lower lobe atelectasis and bilateral 

pleural effusions.  Upper lobes are quite patent and well hydrated.  The patient

was started back on IV Zosyn.  She did become hypotensive briefly and she was 

given a liter of IV fluid bolus and currently she is on normal saline at rate of

100 mL an hour.  She is improved her blood pressure and she did not require any 

pressors.  She is currently awake.  She is following commands.  However, she has

a very poor insight on her condition.  She is not aware that she is quite 

debilitated in [place] major disadvantage for recovering from her chronic 

illness.  The patient also is receiving enteral feeding for nutritional support 

in the form of vitamin 1.2 which is currently at goal.  I added this patient IV 

Zosyn.  She is on Lovenox for DVT prophylaxis.  CAT scan of the chest also 

showed emphysema bilaterally.  She is on bronchodilators.  She is also on IV 

Solu-Medrol.  IV Protonix for GI prophylaxis.  





On 09/29/2019, the patient is being seen in follow-up she is awake and alert.  

She is following commands.  She is on a BiPAP at a pressure of 10/5 cm of water 

with an FiO2 of 40%.  The patient is very much compliant and tolerating her 

BiPAP.  No significant cough or sputum production.  The chest x-ray from today 

shows small bilateral pleural effusion, right middle lobe/right lower lobe a

telectasis.  Her pro-calcitonin level was low.  She is covered with IV Zosyn.  

She is also covered with IV Solu-Medrol and the dose was reduced down to 40 mg 

every 12 hours.  He is on DuoNeb nebulized treatments around the clock and she 

is receiving enteral feeding for nutritional support.  Sodium level is up to 

147.  Atelectatic discussion with the patient's family.  The family wants 

everything done including the possibility of long-term vent support.  In that 

case, it made recommendations to proceed with a tracheostomy and the patient is 

unable to breathe independently without any form of respiratory  assisting 

devices and the patient is quite debilitated and has significant malnutrition a

nd neuromuscular weakness.  





Patient was reevaluated today on 9/30/2019, patient remains on BiPAP, presently 

at a pressure of 10/5 and FiO2 of 40%.  She seems to be almost BiPAP dependent. 

Her chest x-ray is showing significant worsening of the right lower lobe and 

right lobe collapse and atelectasis, there is also some component of pleural 

effusion.  Remains on antibiotics in the form of Zosyn, patient is on 

bronchodilators is also on IV Solu-Medrol.  Not much of a change over the last 

24 hours.  Apparently the patient is to be seen by surgery today, and Dr. Liu recommended tracheostomy which will likely be done tomorrow or in the 

next couple of days.  Patient will be bronchoscoped after tracheostomy and 

mechanical ventilation.  And she read he has a PEG tube in place.  Presently the

patient is about the same, and not much change over the last 24 hours.  WBC 

count is 9.5 hemoglobin 9.6 electrolytes are normal bicarb is 37 renal 

functioning is normal.  Patient was already seen by general surgery, and surgery

is being considered for either tomorrow or Wednesday.





Patient was reevaluated today on 10/1/2019, remains on BiPAP this morning, 

scheduled to undergo tracheostomy this morning.  Patient again seems to be 

almost BiPAP dependent.  Chest x-ray continues to show evidence of right lower 

lobe and right lobe collapse and atelectasis.  Small tiny pleural effusion is 

noted.  Remains on antibiotics, bronchodilators, IV Solu-Medrol, and again the 

patient is scheduled for tracheostomy today.  Family is at bedside, and I 

discussed the option of bronchoscopy after tracheostomy which I will likely 

perform tomorrow on this patient to evaluate the right middle lobe and right l

ower lobe.








Objective





- Vital Signs


Vital signs: 


                                   Vital Signs











Temp  98.0 F   10/01/19 12:30


 


Pulse  99   10/01/19 12:40


 


Resp  12   10/01/19 12:40


 


BP  110/85   10/01/19 12:40


 


Pulse Ox  100   10/01/19 12:40








                                 Intake & Output











 09/30/19 10/01/19 10/01/19





 18:59 06:59 18:59


 


Intake Total 370 345 580.437


 


Output Total 750 875 465


 


Balance -380 -530 115.437


 


Weight 36 kg 38 kg 


 


Intake:   


 


   195 580


 


    .9 110 70 30


 


    Piperacillin-Tazobactam 3  125 100





    .375 gm In Sodium   





    Chloride 0.9% 100 ml @ 25   





    mls/hr IVPB Q8H [facility] Rx#:   





    [id number]   


 


    Potassium Chloride 20 meq   250





    In Water For Injection 1   





    100ml.bag @ 50 mls/hr   





    IVPB Q2H [facility] Rx#:   





    [id number]   


 


  Intake, IV Titration   0.437





  Amount   


 


    Propofol 1,000 mg In   0.437





    Empty Bag 1 bag @ Titrate   





    IV .Q0M [facility] Rx#:   





    [id number]   


 


  Tube Feeding 200 120 0


 


  Other 60 30 


 


Output:   


 


  Urine 750 875 460


 


  Estimated Blood Loss   5


 


Other:   


 


  Voiding Method Indwelling Catheter Indwelling Catheter Indwelling Catheter














- Exam





Physical Exam: Revealed a 54-year-old female, looks cachectic, 


Head: Atraumatic, normocephalic.  Looks cachectic, on BiPAP.


HEENT:[Neck is supple.] [No neck masses.] [No thyromegaly.] [No JVD.]  PERRLA, 

EOMI, no icterus.


Chest: Extremely diminished breath sounds at the bases especially at the right 

base no crackles, no rhonchi, no wheezes.  Symmetrical chest expansion.  

Significant wasting noted of the chest muscles.


Cardiac Exam: [Normal S1 and S2, no S3 gallop, no murmur.]


Abdomen: [Soft, nontender,  no megaly, no rebound, no guarding, normal bowel 

sounds.]


Extremities: [No clubbing, no edema, no cyanosis.]


Neurological Exam: [No focal neurologic deficit.]  Alert and oriented 3.


Psychiatric: Normal mood affect and normal mental status examination.


Skin: No rashes.


Spine: No scoliosis.


Musculoskeletal: Severe muscle atrophy noted throughout.





- Labs


CBC & Chem 7: 


                                 10/01/19 04:50





                                 10/01/19 04:50


Labs: 


                  Abnormal Lab Results - Last 24 Hours (Table)











  09/30/19 09/30/19 10/01/19 Range/Units





  19:05 23:42 04:50 


 


RBC     (3.80-5.40)  m/uL


 


Hgb     (11.4-16.0)  gm/dL


 


Hct     (34.0-46.0)  %


 


Plt Count     (150-450)  k/uL


 


ABG pCO2     (35-45)  mmHg


 


ABG pO2     ()  mmHg


 


ABG HCO3     (21-25)  mmol/L


 


ABG Total CO2     (19-24)  mmol/L


 


ABG O2 Saturation     (94-97)  %


 


Potassium     (3.5-5.1)  mmol/L


 


Carbon Dioxide     (22-30)  mmol/L


 


BUN     (7-17)  mg/dL


 


Creatinine     (0.52-1.04)  mg/dL


 


POC Glucose (mg/dL)  113 H  121 H   (75-99)  mg/dL


 


Iron    47 L  ()  ug/dL


 


TIBC    201 L  (228-460)  ug/dL














  10/01/19 10/01/19 10/01/19 Range/Units





  04:50 04:50 13:36 


 


RBC  3.05 L    (3.80-5.40)  m/uL


 


Hgb  9.8 L    (11.4-16.0)  gm/dL


 


Hct  30.0 L    (34.0-46.0)  %


 


Plt Count  476 H    (150-450)  k/uL


 


ABG pCO2    56 H  (35-45)  mmHg


 


ABG pO2    110 H  ()  mmHg


 


ABG HCO3    36 H  (21-25)  mmol/L


 


ABG Total CO2    37 H  (19-24)  mmol/L


 


ABG O2 Saturation    98.3 H  (94-97)  %


 


Potassium   3.3 L   (3.5-5.1)  mmol/L


 


Carbon Dioxide   40 H   (22-30)  mmol/L


 


BUN   18 H   (7-17)  mg/dL


 


Creatinine   0.25 L   (0.52-1.04)  mg/dL


 


POC Glucose (mg/dL)     (75-99)  mg/dL


 


Iron     ()  ug/dL


 


TIBC     (228-460)  ug/dL








                      Microbiology - Last 24 Hours (Table)











 09/26/19 12:40 Blood Culture - Preliminary





 Blood    No Growth after 96 hours














Assessment and Plan


Assessment: 





Impression:





1 acute on chronic hypoxic respiratory failure





2 severe underlying COPD





3 history of laryngeal cancer and previous chemoradiation therapy





4 severe protein calorie malnutrition BMI of 11.9





5 chronic smoking





6 chronic anemia of chronic disease





7 right lower lobe and right middle lobe collapse, may consider bronchoscopy on 

this patient after tracheostomy and mechanical ventilation, patient is not a 

great candidate for bronchoscopy at this point since her pulmonary status is 

marginal and she would likely decompensated and required intubation during the 

bronchoscopy.





8 chronic hypoxic and hypercapnic respiratory failure, requiring BiPAP, and the 

patient is BiPAP dependent.





Recommendation:


Proceed with tracheostomy today.


Plan for bronchoscopy possibly tomorrow while the patient is on mechanical 

ventilation.


Continue enteral feeding via PEG tube which is already in place.


Continue GI and DVT prophylaxis.  Discussed with the patient and her family 

members at bedside the plans for tracheostomy today and bronchoscopy tomorrow.


Continue bronchodilators.  Will follow.  Prognosis in the long term is poor and 

guarded.















































Time with Patient: Less than 30

## 2019-10-01 NOTE — XR
EXAMINATION TYPE: XR chest 1V portable

 

DATE OF EXAM: 10/1/2019

 

COMPARISON: 9/30/2019

 

HISTORY: Shortness of breath

 

TECHNIQUE: Single frontal view of the chest is obtained.

 

FINDINGS:  Bilateral consolidation and pleural effusion greater on the right is stable. Mediport cath
eter noted there is underlying COPD. Biapical pleural thickening noted throughout the right stable. 

 

Left-sided PICC line noted. Surgical staples in the midline with a catheter seen overlying the left u
pper quadrant which may be related to gastrostomy tube. 

 

 

IMPRESSION: Bilateral infiltrate and pleural effusion and asymmetric right apical pleural thickening 
or mass. Stable.

## 2019-10-01 NOTE — P.PN
Subjective


Progress Note Date: 10/01/19


Principal diagnosis: 





Acute on chronic hypoxic respiratory failure





Patient was seen and examined.  No acute events overnight.  Just got back from 

tracheostomy.  No complications during procedure.  Fully sedated on propofol.  

Currently on vent settings tidal volume 400, FiO2 100 PEEP 5 and rate of 10.





Objective





- Vital Signs


Vital signs: 


                                   Vital Signs











Temp  98.0 F   10/01/19 12:30


 


Pulse  99   10/01/19 12:40


 


Resp  12   10/01/19 12:40


 


BP  110/85   10/01/19 12:40


 


Pulse Ox  100   10/01/19 12:40








                                 Intake & Output











 09/30/19 10/01/19 10/01/19





 18:59 06:59 18:59


 


Intake Total 370 345 580.437


 


Output Total 750 875 465


 


Balance -380 -530 115.437


 


Weight 36 kg 38 kg 


 


Intake:   


 


   195 580


 


    .9 110 70 30


 


    Piperacillin-Tazobactam 3  125 100





    .375 gm In Sodium   





    Chloride 0.9% 100 ml @ 25   





    mls/hr IVPB Q8H [facility] Rx#:   





    [id number]   


 


    Potassium Chloride 20 meq   250





    In Water For Injection 1   





    100ml.bag @ 50 mls/hr   





    IVPB Q2H [facility] Rx#:   





    [id number]   


 


  Intake, IV Titration   0.437





  Amount   


 


    Propofol 1,000 mg In   0.437





    Empty Bag 1 bag @ Titrate   





    IV .Q0M [facility] Rx#:   





    [id number]   


 


  Tube Feeding 200 120 0


 


  Other 60 30 


 


Output:   


 


  Urine 750 875 460


 


  Estimated Blood Loss   5


 


Other:   


 


  Voiding Method Indwelling Catheter Indwelling Catheter Indwelling Catheter














- Exam





General: [Cachectic], [sedated, tracheostomy site clean and dry], [appears at 

stated age]


Derm: [warm], [dry]


Head: [atraumatic], [normocephalic], [bitemporal wasting]


Eyes:  [no lid lag], [anicteric sclera]


Mouth: [no lip lesion], [mucus membranes moist]


Cardiovascular: [S1S2 reg], [tachycardic], [positive DP pulse bilateral], 


Lungs: [Decreased breath sounds bilateral], [no rhonchi, no rales] , [no 

accessory muscle use]


Abdominal: [soft], [ nontender to palpation], [no guarding], [Anderson catheter in 

place]


Ext: [no gross muscle atrophy], [no edema], [no contractures]


Neuro: [no focal neuro deficits]


Psych: [Sedated]





- Labs


CBC & Chem 7: 


                                 10/01/19 04:50





                                 10/01/19 04:50


Labs: 


                  Abnormal Lab Results - Last 24 Hours (Table)











  09/30/19 09/30/19 10/01/19 Range/Units





  19:05 23:42 04:50 


 


RBC     (3.80-5.40)  m/uL


 


Hgb     (11.4-16.0)  gm/dL


 


Hct     (34.0-46.0)  %


 


Plt Count     (150-450)  k/uL


 


Potassium     (3.5-5.1)  mmol/L


 


Carbon Dioxide     (22-30)  mmol/L


 


BUN     (7-17)  mg/dL


 


Creatinine     (0.52-1.04)  mg/dL


 


POC Glucose (mg/dL)  113 H  121 H   (75-99)  mg/dL


 


Iron    47 L  ()  ug/dL


 


TIBC    201 L  (228-460)  ug/dL














  10/01/19 10/01/19 Range/Units





  04:50 04:50 


 


RBC  3.05 L   (3.80-5.40)  m/uL


 


Hgb  9.8 L   (11.4-16.0)  gm/dL


 


Hct  30.0 L   (34.0-46.0)  %


 


Plt Count  476 H   (150-450)  k/uL


 


Potassium   3.3 L  (3.5-5.1)  mmol/L


 


Carbon Dioxide   40 H  (22-30)  mmol/L


 


BUN   18 H  (7-17)  mg/dL


 


Creatinine   0.25 L  (0.52-1.04)  mg/dL


 


POC Glucose (mg/dL)    (75-99)  mg/dL


 


Iron    ()  ug/dL


 


TIBC    (228-460)  ug/dL








                      Microbiology - Last 24 Hours (Table)











 09/26/19 12:40 Blood Culture - Preliminary





 Blood    No Growth after 96 hours














Assessment and Plan


Assessment: 





Assessment and Plan





Acute on chronic hypoxic respiratory failure with hypercapnia due to laryngeal 

cancer, pleural effusion and COPD


Severe protein calorie malnutrition with cachexia with G-tube due to dysphagia 

BMI 12.4


Mediastinal lymphadenopathy


Anemia with thrombocytosis


Hypokalemia








Chest CT shows increased pleural effusion, right middle lobe pneumonia versus 

atelectasis.  Pro-calcitonin negative, low concerns for pneumonia.  POD 0 

tracheostomy.  Plans:  Continue full ventilator support managed by pulmonology. 

 Continue Zosyn for empiric treatment of pneumonia and aspiration.  Continue 

Solu-Medrol 40 mg IV twice a day along with DuoNeb as needed for shortness of 

breath and wheezing for treatment of COPD.  Telemetry monitoring.  Head of bed 

elevation.  Pulmonology and general surgery on board.





BMI 13.1.  Plans: Continue G-tube feeds.  Consult dietitian.





As seen on previous CT chest.  Plans: Will likely need bronchoscopy with 

pulmonology after trach.  Follow pulmonology recommendations.





Hemoglobin 9.8.  Macrocytic during admission.  Thrombocytosis of 470 likely from

 iron deficiency. Iron studies shows iron deficiency.  B12 and folate within 

normal limits.  Plans: Hemoglobin stable. Transfuse if hemoglobin less than 7.  

Follow iron studies, B12 and folate.





Potassium 3.3.  Plans: Replace via protocol.





[Patient admitted for acute on chronic hypoxic respiratory failure due to 

multiple reasons.  She is POD 0 tracheostomy. Patient is pending clinical 

improvement.]

## 2019-10-02 LAB
ANION GAP SERPL CALC-SCNC: 4 MMOL/L
BUN SERPL-SCNC: 23 MG/DL (ref 7–17)
CALCIUM SPEC-MCNC: 8.3 MG/DL (ref 8.4–10.2)
CHLORIDE SERPL-SCNC: 102 MMOL/L (ref 98–107)
CO2 BLDA-SCNC: 34 MMOL/L (ref 19–24)
CO2 SERPL-SCNC: 34 MMOL/L (ref 22–30)
DEPRECATED POLYS # FLD: 100 %
ERYTHROCYTE [DISTWIDTH] IN BLOOD BY AUTOMATED COUNT: 2.9 M/UL (ref 3.8–5.4)
ERYTHROCYTE [DISTWIDTH] IN BLOOD: 15 % (ref 11.5–15.5)
GLUCOSE BLD-MCNC: 103 MG/DL (ref 75–99)
GLUCOSE BLD-MCNC: 104 MG/DL (ref 75–99)
GLUCOSE BLD-MCNC: 129 MG/DL (ref 75–99)
GLUCOSE BLD-MCNC: 150 MG/DL (ref 75–99)
GLUCOSE SERPL-MCNC: 157 MG/DL (ref 74–99)
HCO3 BLDA-SCNC: 32 MMOL/L (ref 21–25)
HCT VFR BLD AUTO: 29.5 % (ref 34–46)
HGB BLD-MCNC: 9.3 GM/DL (ref 11.4–16)
MCH RBC QN AUTO: 32 PG (ref 25–35)
MCHC RBC AUTO-ENTMCNC: 31.4 G/DL (ref 31–37)
MCV RBC AUTO: 101.8 FL (ref 80–100)
MONONUC CELLS # FLD: 0 %
NUC CELL # FLD: 7750 /UL
PCO2 BLDA: 38 MMHG (ref 35–45)
PH BLDA: 7.54 [PH] (ref 7.35–7.45)
PLATELET # BLD AUTO: 352 K/UL (ref 150–450)
PO2 BLDA: 53 MMHG (ref 83–108)
POTASSIUM SERPL-SCNC: 3.3 MMOL/L (ref 3.5–5.1)
SODIUM SERPL-SCNC: 140 MMOL/L (ref 137–145)
WBC # BLD AUTO: 10.3 K/UL (ref 3.8–10.6)

## 2019-10-02 RX ADMIN — PIPERACILLIN AND TAZOBACTAM SCH MLS/HR: 3; .375 INJECTION, POWDER, FOR SOLUTION INTRAVENOUS at 06:29

## 2019-10-02 RX ADMIN — INSULIN ASPART SCH UNIT: 100 INJECTION, SOLUTION INTRAVENOUS; SUBCUTANEOUS at 06:18

## 2019-10-02 RX ADMIN — MORPHINE SULFATE PRN MG: 4 INJECTION, SOLUTION INTRAMUSCULAR; INTRAVENOUS at 06:19

## 2019-10-02 RX ADMIN — PIPERACILLIN AND TAZOBACTAM SCH MLS/HR: 3; .375 INJECTION, POWDER, FOR SOLUTION INTRAVENOUS at 22:59

## 2019-10-02 RX ADMIN — ISODIUM CHLORIDE PRN MG: 0.03 SOLUTION RESPIRATORY (INHALATION) at 23:33

## 2019-10-02 RX ADMIN — POTASSIUM CHLORIDE SCH MLS/HR: 14.9 INJECTION, SOLUTION INTRAVENOUS at 06:42

## 2019-10-02 RX ADMIN — IPRATROPIUM BROMIDE AND ALBUTEROL SULFATE SCH ML: .5; 3 SOLUTION RESPIRATORY (INHALATION) at 15:58

## 2019-10-02 RX ADMIN — PIPERACILLIN AND TAZOBACTAM SCH MLS/HR: 3; .375 INJECTION, POWDER, FOR SOLUTION INTRAVENOUS at 15:52

## 2019-10-02 RX ADMIN — ENOXAPARIN SODIUM SCH MG: 40 INJECTION SUBCUTANEOUS at 08:26

## 2019-10-02 RX ADMIN — INSULIN ASPART SCH: 100 INJECTION, SOLUTION INTRAVENOUS; SUBCUTANEOUS at 01:15

## 2019-10-02 RX ADMIN — MORPHINE SULFATE PRN MG: 4 INJECTION, SOLUTION INTRAMUSCULAR; INTRAVENOUS at 17:40

## 2019-10-02 RX ADMIN — IPRATROPIUM BROMIDE AND ALBUTEROL SULFATE SCH ML: .5; 3 SOLUTION RESPIRATORY (INHALATION) at 19:18

## 2019-10-02 RX ADMIN — PANTOPRAZOLE SODIUM SCH MG: 40 INJECTION, POWDER, FOR SOLUTION INTRAVENOUS at 08:27

## 2019-10-02 RX ADMIN — IPRATROPIUM BROMIDE AND ALBUTEROL SULFATE SCH ML: .5; 3 SOLUTION RESPIRATORY (INHALATION) at 08:55

## 2019-10-02 RX ADMIN — IPRATROPIUM BROMIDE AND ALBUTEROL SULFATE SCH ML: .5; 3 SOLUTION RESPIRATORY (INHALATION) at 11:56

## 2019-10-02 RX ADMIN — METHYLPREDNISOLONE SODIUM SUCCINATE SCH MG: 40 INJECTION, POWDER, FOR SOLUTION INTRAMUSCULAR; INTRAVENOUS at 20:05

## 2019-10-02 RX ADMIN — INSULIN ASPART SCH: 100 INJECTION, SOLUTION INTRAVENOUS; SUBCUTANEOUS at 18:42

## 2019-10-02 RX ADMIN — ISODIUM CHLORIDE PRN MG: 0.03 SOLUTION RESPIRATORY (INHALATION) at 03:19

## 2019-10-02 RX ADMIN — POTASSIUM CHLORIDE SCH MLS/HR: 14.9 INJECTION, SOLUTION INTRAVENOUS at 08:27

## 2019-10-02 RX ADMIN — MORPHINE SULFATE PRN MG: 4 INJECTION, SOLUTION INTRAMUSCULAR; INTRAVENOUS at 10:08

## 2019-10-02 RX ADMIN — METHYLPREDNISOLONE SODIUM SUCCINATE SCH MG: 40 INJECTION, POWDER, FOR SOLUTION INTRAMUSCULAR; INTRAVENOUS at 08:26

## 2019-10-02 RX ADMIN — INSULIN ASPART SCH: 100 INJECTION, SOLUTION INTRAVENOUS; SUBCUTANEOUS at 15:17

## 2019-10-02 RX ADMIN — PROPOFOL SCH MLS/HR: 10 INJECTION, EMULSION INTRAVENOUS at 15:11

## 2019-10-02 NOTE — P.PN
Subjective


Progress Note Date: 10/02/19


Principal diagnosis: 





Acute on chronic hypoxic and hypercapnic respiratory failure








 This is a 54-year-old female patient of Dr. Jerome, who was recently 

hospitalized for acute respiratory failure secondary to aspiration pneumonia, 

hypoxemic and hypercapnic requiring intubation and placement on mechanical 

ventilation.  Patient was successfully extubated, was treated with antibiotics, 

completed a course of Levaquin and Zosyn.  Patient showed significant clinical 

and radiographic improvement, by the time of her discharge to the [facility].  Patient 

also has history of laryngeal cancer with previous chemoradiation, and had 

evidence of severe malnutrition.  In addition patient failed modified barium 

swallow, and was having evidence of aspiration with all consistencies.  Patient 

also had evidence of electrolyte abnormality with hyponatremia and hypokalemia, 

which improved with treatment.  Patient had a gastrostomy tube placed for 

nutritional support, and she was strict NPO.  Sputum cultures from previous 

admission were negative.  Other medical history includes chronic anemia of 

chronic disease, previous tobacco dependence.  Patient was discharged to the 

[facility] on 09/25/2019 however she started experiencing shortness 

of breath at the [facility], and was apparently hypoxic and was brought back for 

evaluation on oral 09/26/2019.  He denied any chest pain, she denies any oral 

intake at the nursing home home, she states she was getting her nutrition 

strictly through the gastrostomy tube.  No aspiration, no fever or chills, chest

x-ray showed basilar atelectasis and associated pleural effusions.  Lab work 

showed white blood cell count of 14.0, hemoglobin of 11, correlation profile was

within normal limits, blood gas showed pO2 of 64, pCO2 of 67, and pH of 7.32.  

Sodium was 140, potassium is 4.0, chloride is 98, CO2 37, B1 is 21, creatinine 

0.24.  O2 sat in the emergency department was ranging from 84-86 patient was 

placed on supplemental oxygen.  Patient was given IV steroids, nebulized 

bronchodilators, and worsening the patient in consultation for shortness of 

breath.  She is awake and alert, she is sitting up in bed, appears to be in no 

acute distress, currently on 2 L of oxygen, no significant wheezing, or 

congestion, she does have a weak cough, which does not sound congested, lung 

sounds are diminished, no rales auscultated.  Follow-up blood work was reviewed 

today, white blood cell count is 15.5, hemoglobin is 9.7, repeat blood gas 

showed pO2 of 60, pCO2 59, and pH of 7.39 this was done on FiO2 28%.  Patient 

looks extremely cachectic and weak, her voice is very weak and hoarse, but she 

appears to be in no acute distress.





On 09/28/2019 I'm seeing this patient for a follow-up.  This is an extremely 

debilitated and severely malnourished female patient who is post respiratory 

failure who was readmitted for ongoing difficulties in breathing.  Noted the 

patient's BMI is 11.9 and she has kwashiorkor.  The patient got moved yesterday 

to the intensive care unit because of worsening shortness of breath and altered 

mentation.  Note that on the floor the patient had a blood gas showing a pH of  

0.28 with a pCO2 of 83 and pO2 of 72.  This was on FiO2 of 32%.  She was having 

increased tachypnea and respiratory distress.  At that point the patient got 

transferred to the intensive care unit.  I was very hesitant intubated this 

patient based on an underlying condition and status.  Note that the patient is 

extremely frail and has absolutely no muscle mass and intubation may be quite 

devastating for this patient had the patient accordingly was placed on a BiPAP 

at a pressure of 10/5 cm of water.  She was also given some Ativan and morphine 

which.  Much calm that down.  Her subsequent blood gas showed a pH of 7.38 with 

a pCO2 of 69 a pO2 of 62.  I realized the patient's respiratory insufficiency he

had it performed a CAT scan of the chest yesterday and the CAT scan showed 

complete occlusion of the distal bronchus intermedius and right middle lobe 

atelectasis.  There was also atelectatic changes in lung bases bilaterally with 

a right middle lobe atelectasis right lower lobe atelectasis and bilateral 

pleural effusions.  Upper lobes are quite patent and well hydrated.  The patient

was started back on IV Zosyn.  She did become hypotensive briefly and she was 

given a liter of IV fluid bolus and currently she is on normal saline at rate of

100 mL an hour.  She is improved her blood pressure and she did not require any 

pressors.  She is currently awake.  She is following commands.  However, she has

a very poor insight on her condition.  She is not aware that she is quite 

debilitated in [place] major disadvantage for recovering from her chronic 

illness.  The patient also is receiving enteral feeding for nutritional support 

in the form of vitamin 1.2 which is currently at goal.  I added this patient IV 

Zosyn.  She is on Lovenox for DVT prophylaxis.  CAT scan of the chest also 

showed emphysema bilaterally.  She is on bronchodilators.  She is also on IV 

Solu-Medrol.  IV Protonix for GI prophylaxis.  





On 09/29/2019, the patient is being seen in follow-up she is awake and alert.  

She is following commands.  She is on a BiPAP at a pressure of 10/5 cm of water 

with an FiO2 of 40%.  The patient is very much compliant and tolerating her 

BiPAP.  No significant cough or sputum production.  The chest x-ray from today 

shows small bilateral pleural effusion, right middle lobe/right lower lobe a

telectasis.  Her pro-calcitonin level was low.  She is covered with IV Zosyn.  

She is also covered with IV Solu-Medrol and the dose was reduced down to 40 mg 

every 12 hours.  He is on DuoNeb nebulized treatments around the clock and she 

is receiving enteral feeding for nutritional support.  Sodium level is up to 

147.  Atelectatic discussion with the patient's family.  The family wants 

everything done including the possibility of long-term vent support.  In that 

case, it made recommendations to proceed with a tracheostomy and the patient is 

unable to breathe independently without any form of respiratory  assisting 

devices and the patient is quite debilitated and has significant malnutrition a

nd neuromuscular weakness.  





Patient was reevaluated today on 9/30/2019, patient remains on BiPAP, presently 

at a pressure of 10/5 and FiO2 of 40%.  She seems to be almost BiPAP dependent. 

Her chest x-ray is showing significant worsening of the right lower lobe and 

right lobe collapse and atelectasis, there is also some component of pleural 

effusion.  Remains on antibiotics in the form of Zosyn, patient is on 

bronchodilators is also on IV Solu-Medrol.  Not much of a change over the last 

24 hours.  Apparently the patient is to be seen by surgery today, and Dr. Liu recommended tracheostomy which will likely be done tomorrow or in the 

next couple of days.  Patient will be bronchoscoped after tracheostomy and 

mechanical ventilation.  And she read he has a PEG tube in place.  Presently the

patient is about the same, and not much change over the last 24 hours.  WBC 

count is 9.5 hemoglobin 9.6 electrolytes are normal bicarb is 37 renal 

functioning is normal.  Patient was already seen by general surgery, and surgery

is being considered for either tomorrow or Wednesday.





Patient was reevaluated today on 10/1/2019, remains on BiPAP this morning, 

scheduled to undergo tracheostomy this morning.  Patient again seems to be 

almost BiPAP dependent.  Chest x-ray continues to show evidence of right lower 

lobe and right lobe collapse and atelectasis.  Small tiny pleural effusion is 

noted.  Remains on antibiotics, bronchodilators, IV Solu-Medrol, and again the 

patient is scheduled for tracheostomy today.  Family is at bedside, and I 

discussed the option of bronchoscopy after tracheostomy which I will likely 

perform tomorrow on this patient to evaluate the right middle lobe and right l

ower lobe.





Patient was reevaluated today on 10/2/2019, she underwent tracheostomy 

yesterday, presently the patient is on mechanical ventilation, she is on before 

meals mode of 12 tidal volume of 350 FiO2 is down to 50%, and PEEP is 5.  

Patient seems to be very calm, comfortable, very appropriate, and her chest x-

ray continues to show significant right lower lobe and right middle lobe 

collapse.  Hence considering the patient is now on mechanical ventilation, I 

have discussed with the patient the option of bronchoscopy and evaluation of the

right middle lobe and right lower lobe, and she is agreeable to proceed with the

procedure today.  The bronchoscopy team was notified, and it will be done 

sometime this afternoon.  In the meantime patient remains nothing by mouth, her 

PEG tube feeding is on hold, and will empty her stomach. ABG this morning showed

a pO2 of 53 pCO2 of 38 pH of 7.54.  Electrolytes are normal renal profile is 

normal potassium is a bit low at 3.3 WBC count is 10.3 hemoglobin is 9.3.








Objective





- Vital Signs


Vital signs: 


                                   Vital Signs











Temp  98.2 F   10/02/19 04:00


 


Pulse  102 H  10/02/19 09:10


 


Resp  19   10/02/19 07:00


 


BP  94/78   10/02/19 06:00


 


Pulse Ox  96   10/02/19 07:00








                                 Intake & Output











 10/01/19 10/02/19 10/02/19





 18:59 06:59 18:59


 


Intake Total 1153.034 380 210


 


Output Total 820 520 45


 


Balance 333.034 -140 165


 


Weight  37.2 kg 


 


Intake:   


 


   180 210


 


    .9 90 80 10


 


    Piperacillin-Tazobactam 3 200 100 100





    .375 gm In Sodium   





    Chloride 0.9% 100 ml @ 25   





    mls/hr IVPB Q8H [facility] Rx#:   





    [id number]   


 


    Potassium Chloride 20 meq 250  100





    In Water For Injection 1   





    100ml.bag @ 50 mls/hr   





    IVPB Q2H [facility] Rx#:   





    [id number]   


 


  Intake, IV Titration 13.034  





  Amount   


 


    Propofol 1,000 mg In 13.034  





    Empty Bag 1 bag @ Titrate   





    IV .Q0M [facility] Rx#:   





    [id number]   


 


  Tube Feeding 200 200 


 


  Other 200  


 


Output:   


 


  Urine 815 520 45


 


  Estimated Blood Loss 5  


 


Other:   


 


  Voiding Method Indwelling Catheter Indwelling Catheter Indwelling Catheter














- Exam





Physical Exam: Revealed a 54-year-old female, looks cachectic, on mechanical 

ventilation, awake, follows all simple instructions, in no distress.


Head: Atraumatic, normocephalic.  Looks cachectic, on mechanical ventilation.  

Tracheostomy tube is intact.


HEENT:[Neck is supple.] [No neck masses.] [No thyromegaly.] [No JVD.]  PERRLA, 

EOMI, no icterus.


Chest: Extremely diminished breath sounds at the bases especially at the right 

base no crackles, no rhonchi, no wheezes.  Symmetrical chest expansion.  

Significant wasting noted of the chest muscles.


Cardiac Exam: [Normal S1 and S2, no S3 gallop, no murmur.]


Abdomen: [Soft, nontender,  no megaly, no rebound, no guarding, normal bowel 

sounds.]


Extremities: [No clubbing, no edema, no cyanosis.]


Neurological Exam: [No focal neurologic deficit.]  Alert and oriented 3.


Psychiatric: Normal mood affect and normal mental status examination.


Skin: No rashes.


Spine: No scoliosis.


Musculoskeletal: Severe muscle atrophy noted throughout.





- Labs


CBC & Chem 7: 


                                 10/02/19 04:09





                                 10/02/19 04:14


Labs: 


                  Abnormal Lab Results - Last 24 Hours (Table)











  10/01/19 10/01/19 10/02/19 Range/Units





  04:50 13:36 00:17 


 


RBC     (3.80-5.40)  m/uL


 


Hgb     (11.4-16.0)  gm/dL


 


Hct     (34.0-46.0)  %


 


MCV     (80.0-100.0)  fL


 


ABG pH     (7.35-7.45)  


 


ABG pCO2   56 H   (35-45)  mmHg


 


ABG pO2   110 H   ()  mmHg


 


ABG HCO3   36 H   (21-25)  mmol/L


 


ABG Total CO2   37 H   (19-24)  mmol/L


 


ABG O2 Saturation   98.3 H   (94-97)  %


 


Potassium     (3.5-5.1)  mmol/L


 


Carbon Dioxide     (22-30)  mmol/L


 


BUN     (7-17)  mg/dL


 


Creatinine     (0.52-1.04)  mg/dL


 


Glucose     (74-99)  mg/dL


 


POC Glucose (mg/dL)    129 H  (75-99)  mg/dL


 


Calcium     (8.4-10.2)  mg/dL


 


Iron  47 L    ()  ug/dL


 


TIBC  201 L    (228-460)  ug/dL














  10/02/19 10/02/19 10/02/19 Range/Units





  04:09 04:14 06:05 


 


RBC  2.90 L    (3.80-5.40)  m/uL


 


Hgb  9.3 L    (11.4-16.0)  gm/dL


 


Hct  29.5 L    (34.0-46.0)  %


 


MCV  101.8 H    (80.0-100.0)  fL


 


ABG pH     (7.35-7.45)  


 


ABG pCO2     (35-45)  mmHg


 


ABG pO2     ()  mmHg


 


ABG HCO3     (21-25)  mmol/L


 


ABG Total CO2     (19-24)  mmol/L


 


ABG O2 Saturation     (94-97)  %


 


Potassium   3.3 L   (3.5-5.1)  mmol/L


 


Carbon Dioxide   34 H   (22-30)  mmol/L


 


BUN   23 H   (7-17)  mg/dL


 


Creatinine   0.28 L   (0.52-1.04)  mg/dL


 


Glucose   157 H   (74-99)  mg/dL


 


POC Glucose (mg/dL)    150 H  (75-99)  mg/dL


 


Calcium   8.3 L   (8.4-10.2)  mg/dL


 


Iron     ()  ug/dL


 


TIBC     (228-460)  ug/dL














  10/02/19 Range/Units





  08:00 


 


RBC   (3.80-5.40)  m/uL


 


Hgb   (11.4-16.0)  gm/dL


 


Hct   (34.0-46.0)  %


 


MCV   (80.0-100.0)  fL


 


ABG pH  7.54 H  (7.35-7.45)  


 


ABG pCO2   (35-45)  mmHg


 


ABG pO2  53 L*  ()  mmHg


 


ABG HCO3  32 H  (21-25)  mmol/L


 


ABG Total CO2  34 H  (19-24)  mmol/L


 


ABG O2 Saturation  88.3 L  (94-97)  %


 


Potassium   (3.5-5.1)  mmol/L


 


Carbon Dioxide   (22-30)  mmol/L


 


BUN   (7-17)  mg/dL


 


Creatinine   (0.52-1.04)  mg/dL


 


Glucose   (74-99)  mg/dL


 


POC Glucose (mg/dL)   (75-99)  mg/dL


 


Calcium   (8.4-10.2)  mg/dL


 


Iron   ()  ug/dL


 


TIBC   (228-460)  ug/dL








                      Microbiology - Last 24 Hours (Table)











 09/26/19 12:40 Blood Culture - Preliminary





 Blood    No Growth after 120 hours














Assessment and Plan


Assessment: 





Impression:





1 acute on chronic hypoxic respiratory failure, status post tracheostomy, 

presently on mechanical ventilation.





2 severe underlying COPD





3 history of laryngeal cancer and previous chemoradiation therapy





4 severe protein calorie malnutrition BMI of 11.9





5 chronic smoking





6 chronic anemia of chronic disease





7 right lower lobe and right middle lobe collapse, patient is agreeable to 

proceed with bronchoscopy today.  And based on the findings further 

recommendations will follow.





8 chronic hypoxic and hypercapnic respiratory failure, presently on mechanical 

ventilation. 





 Recommendation:


Post tracheostomy care.


Plan for bronchoscopy today to evaluate the right lower lobe and right middle 

lobe


Continue enteral feeding via PEG tube which is already in place.


Continue GI and DVT prophylaxis.  Discussed with the patient the issue of 

bronchoscopy and evaluation of the right lung, and she is agreeable to proceed.


Continue bronchodilators.  Will follow.  


Prognosis in the long term is poor and guarded.















































Time with Patient: Less than 30

## 2019-10-02 NOTE — XR
EXAMINATION TYPE: XR chest 1V portable

 

DATE OF EXAM: 10/2/2019

 

COMPARISON: 10/1/2019

 

HISTORY: Respiratory difficulty

 

TECHNIQUE: Single frontal view of the chest is obtained.

 

FINDINGS:  Bilateral consolidation and pleural effusion greater on the right is stable. Mediport cath
eter noted there is underlying COPD. Biapical pleural thickening noted throughout the right stable. L
eft-sided PICC line noted. Surgical staples in the midline with a catheter seen overlying the left up
per quadrant which may be related to gastrostomy tube. 

 

IMPRESSION: Bilateral infiltrate and pleural effusion and asymmetric right apical pleural thickening 
or mass. Stable.

## 2019-10-02 NOTE — P.PN
Subjective


Progress Note Date: 10/02/19


Principal diagnosis: 





Acute on chronic hypoxic respiratory failure





Patient was seen and examined.  No acute events overnight.  Postop day 1 

tracheostomy.  No complications during procedure.  Off-pump propofol and fully 

awake.  Complains of some discomfort at the site of trach along with lower back 

pain, well controlled with morphine.  Currently on vent settings tidal volume 

350, FiO2 50 PEEP 5 and rate of 12.





Objective





- Vital Signs


Vital signs: 


                                   Vital Signs











Temp  98.2 F   10/02/19 04:00


 


Pulse  102 H  10/02/19 09:10


 


Resp  19   10/02/19 07:00


 


BP  94/78   10/02/19 06:00


 


Pulse Ox  96   10/02/19 07:00








                                 Intake & Output











 10/01/19 10/02/19 10/02/19





 18:59 06:59 18:59


 


Intake Total 1153.034 380 210


 


Output Total 820 520 45


 


Balance 333.034 -140 165


 


Weight  37.2 kg 


 


Intake:   


 


   180 210


 


    .9 90 80 10


 


    Piperacillin-Tazobactam 3 200 100 100





    .375 gm In Sodium   





    Chloride 0.9% 100 ml @ 25   





    mls/hr IVPB Q8H [facility] Rx#:   





    [id number]   


 


    Potassium Chloride 20 meq 250  100





    In Water For Injection 1   





    100ml.bag @ 50 mls/hr   





    IVPB Q2H [facility] Rx#:   





    [id number]   


 


  Intake, IV Titration 13.034  





  Amount   


 


    Propofol 1,000 mg In 13.034  





    Empty Bag 1 bag @ Titrate   





    IV .Q0M [facility] Rx#:   





    [id number]   


 


  Tube Feeding 200 200 


 


  Other 200  


 


Output:   


 


  Urine 815 520 45


 


  Estimated Blood Loss 5  


 


Other:   


 


  Voiding Method Indwelling Catheter Indwelling Catheter Indwelling Catheter














- Exam





General: [Cachectic], [sedated, tracheostomy site clean and dry], [appears at 

stated age]


Derm: [warm], [dry]


Head: [atraumatic], [normocephalic], [bitemporal wasting]


Eyes:  [no lid lag], [anicteric sclera]


Mouth: [no lip lesion], [mucus membranes moist]


Cardiovascular: [S1S2 reg], [tachycardic], [positive DP pulse bilateral], 


Lungs: [Decreased breath sounds bilateral], [no rhonchi, no rales] , [no 

accessory muscle use]


Abdominal: [soft], [ nontender to palpation], [no guarding], [Anderson catheter in 

place]


Ext: [no gross muscle atrophy], [no edema], [no contractures]


Neuro: [no focal neuro deficits]


Psych: [Sedated]





- Labs


CBC & Chem 7: 


                                 10/02/19 04:09





                                 10/02/19 04:14


Labs: 


                  Abnormal Lab Results - Last 24 Hours (Table)











  10/01/19 10/01/19 10/02/19 Range/Units





  04:50 13:36 00:17 


 


RBC     (3.80-5.40)  m/uL


 


Hgb     (11.4-16.0)  gm/dL


 


Hct     (34.0-46.0)  %


 


MCV     (80.0-100.0)  fL


 


ABG pH     (7.35-7.45)  


 


ABG pCO2   56 H   (35-45)  mmHg


 


ABG pO2   110 H   ()  mmHg


 


ABG HCO3   36 H   (21-25)  mmol/L


 


ABG Total CO2   37 H   (19-24)  mmol/L


 


ABG O2 Saturation   98.3 H   (94-97)  %


 


Potassium     (3.5-5.1)  mmol/L


 


Carbon Dioxide     (22-30)  mmol/L


 


BUN     (7-17)  mg/dL


 


Creatinine     (0.52-1.04)  mg/dL


 


Glucose     (74-99)  mg/dL


 


POC Glucose (mg/dL)    129 H  (75-99)  mg/dL


 


Calcium     (8.4-10.2)  mg/dL


 


Iron  47 L    ()  ug/dL


 


TIBC  201 L    (228-460)  ug/dL














  10/02/19 10/02/19 10/02/19 Range/Units





  04:09 04:14 06:05 


 


RBC  2.90 L    (3.80-5.40)  m/uL


 


Hgb  9.3 L    (11.4-16.0)  gm/dL


 


Hct  29.5 L    (34.0-46.0)  %


 


MCV  101.8 H    (80.0-100.0)  fL


 


ABG pH     (7.35-7.45)  


 


ABG pCO2     (35-45)  mmHg


 


ABG pO2     ()  mmHg


 


ABG HCO3     (21-25)  mmol/L


 


ABG Total CO2     (19-24)  mmol/L


 


ABG O2 Saturation     (94-97)  %


 


Potassium   3.3 L   (3.5-5.1)  mmol/L


 


Carbon Dioxide   34 H   (22-30)  mmol/L


 


BUN   23 H   (7-17)  mg/dL


 


Creatinine   0.28 L   (0.52-1.04)  mg/dL


 


Glucose   157 H   (74-99)  mg/dL


 


POC Glucose (mg/dL)    150 H  (75-99)  mg/dL


 


Calcium   8.3 L   (8.4-10.2)  mg/dL


 


Iron     ()  ug/dL


 


TIBC     (228-460)  ug/dL














  10/02/19 Range/Units





  08:00 


 


RBC   (3.80-5.40)  m/uL


 


Hgb   (11.4-16.0)  gm/dL


 


Hct   (34.0-46.0)  %


 


MCV   (80.0-100.0)  fL


 


ABG pH  7.54 H  (7.35-7.45)  


 


ABG pCO2   (35-45)  mmHg


 


ABG pO2  53 L*  ()  mmHg


 


ABG HCO3  32 H  (21-25)  mmol/L


 


ABG Total CO2  34 H  (19-24)  mmol/L


 


ABG O2 Saturation  88.3 L  (94-97)  %


 


Potassium   (3.5-5.1)  mmol/L


 


Carbon Dioxide   (22-30)  mmol/L


 


BUN   (7-17)  mg/dL


 


Creatinine   (0.52-1.04)  mg/dL


 


Glucose   (74-99)  mg/dL


 


POC Glucose (mg/dL)   (75-99)  mg/dL


 


Calcium   (8.4-10.2)  mg/dL


 


Iron   ()  ug/dL


 


TIBC   (228-460)  ug/dL








                      Microbiology - Last 24 Hours (Table)











 09/26/19 12:40 Blood Culture - Preliminary





 Blood    No Growth after 120 hours














Assessment and Plan


Assessment: 





Assessment and Plan





Acute on chronic hypoxic respiratory failure with hypercapnia due to laryngeal 

cancer, pleural effusion and COPD


Severe protein calorie malnutrition with cachexia with G-tube due to dysphagia 

BMI 12.4


Mediastinal lymphadenopathy


Anemia with thrombocytosis


Hypokalemia








Chest CT shows increased pleural effusion, right middle lobe pneumonia versus 

atelectasis.  Pro-calcitonin negative, low concerns for pneumonia.  POD 1 

tracheostomy.  Plans:  Continue full ventilator support managed by pulmonology. 

 Continue Zosyn for empiric treatment of pneumonia and aspiration.  Continue 

Solu-Medrol 40 mg IV twice a day along with DuoNeb as needed for shortness of 

breath and wheezing for treatment of COPD. Plans for bronchoscopy today by 

pulmonology.  Telemetry monitoring.  Head of bed elevation.  Pulmonology and 

general surgery on board.





BMI 12.8.  Plans: Continue G-tube feeds.  Consult dietitian.





As seen on previous CT chest.  Plans: Will likely need bronchoscopy with 

pulmonology after trach.  Follow pulmonology recommendations.





Hemoglobin 9.3.  Macrocytic during admission.  Thrombocytosis of 470 likely from

 iron deficiency. Iron studies shows iron deficiency.  B12 and folate within 

normal limits.  Plans: Hemoglobin stable. Transfuse if hemoglobin less than 7.  

Daily CBC.





Potassium 3.3.  Plans: Replace via protocol.





[Patient admitted for acute on chronic hypoxic respiratory failure due to 

multiple reasons.  She is POD 1 tracheostomy.  Plans for bronchoscopy today.  

Patient is pending clinical improvement.   following for LTAC 

placement.  Likely DC in 1-3 days.]

## 2019-10-02 NOTE — XR
EXAMINATION TYPE: XR chest 1V portable

 

DATE OF EXAM: 10/2/2019

 

COMPARISON: 10/2/2019

 

HISTORY: Shortness of breath

 

TECHNIQUE: Single frontal view of the chest is obtained.

 

FINDINGS:  Bilateral consolidation and pleural effusion greater on the right is stable. Mediport cath
eter noted there is underlying COPD. Biapical pleural thickening noted throughout the right stable. L
eft-sided PICC line noted. Surgical staples in the midline with a catheter seen overlying the left up
per quadrant which may be related to gastrostomy tube. Tracheostomy tube seen.

 

 

IMPRESSION:  

1. Bilateral infiltrate and pleural effusion and asymmetric right apical pleural thickening or mass. 
Stable. 

2. Tracheostomy tube appears in good position.

## 2019-10-03 LAB
ANION GAP SERPL CALC-SCNC: 0 MMOL/L
BASOPHILS # BLD AUTO: 0 K/UL (ref 0–0.2)
BASOPHILS NFR BLD AUTO: 0 %
BUN SERPL-SCNC: 19 MG/DL (ref 7–17)
CALCIUM SPEC-MCNC: 8.1 MG/DL (ref 8.4–10.2)
CHLORIDE SERPL-SCNC: 107 MMOL/L (ref 98–107)
CO2 SERPL-SCNC: 33 MMOL/L (ref 22–30)
EOSINOPHIL # BLD AUTO: 0 K/UL (ref 0–0.7)
EOSINOPHIL NFR BLD AUTO: 0 %
ERYTHROCYTE [DISTWIDTH] IN BLOOD BY AUTOMATED COUNT: 2.66 M/UL (ref 3.8–5.4)
ERYTHROCYTE [DISTWIDTH] IN BLOOD: 15.4 % (ref 11.5–15.5)
GLUCOSE BLD-MCNC: 116 MG/DL (ref 75–99)
GLUCOSE BLD-MCNC: 123 MG/DL (ref 75–99)
GLUCOSE BLD-MCNC: 126 MG/DL (ref 75–99)
GLUCOSE BLD-MCNC: 132 MG/DL (ref 75–99)
GLUCOSE SERPL-MCNC: 116 MG/DL (ref 74–99)
HCT VFR BLD AUTO: 27.6 % (ref 34–46)
HGB BLD-MCNC: 8.5 GM/DL (ref 11.4–16)
LYMPHOCYTES # SPEC AUTO: 0.2 K/UL (ref 1–4.8)
LYMPHOCYTES NFR SPEC AUTO: 2 %
MCH RBC QN AUTO: 32.1 PG (ref 25–35)
MCHC RBC AUTO-ENTMCNC: 30.9 G/DL (ref 31–37)
MCV RBC AUTO: 104 FL (ref 80–100)
MONOCYTES # BLD AUTO: 0.2 K/UL (ref 0–1)
MONOCYTES NFR BLD AUTO: 3 %
NEUTROPHILS # BLD AUTO: 6.9 K/UL (ref 1.3–7.7)
NEUTROPHILS NFR BLD AUTO: 94 %
PLATELET # BLD AUTO: 304 K/UL (ref 150–450)
POTASSIUM SERPL-SCNC: 3.4 MMOL/L (ref 3.5–5.1)
SODIUM SERPL-SCNC: 140 MMOL/L (ref 137–145)
WBC # BLD AUTO: 7.3 K/UL (ref 3.8–10.6)

## 2019-10-03 PROCEDURE — 0B9J8ZZ DRAINAGE OF LEFT LOWER LUNG LOBE, VIA NATURAL OR ARTIFICIAL OPENING ENDOSCOPIC: ICD-10-PCS

## 2019-10-03 PROCEDURE — 0BD68ZX EXTRACTION OF RIGHT LOWER LOBE BRONCHUS, VIA NATURAL OR ARTIFICIAL OPENING ENDOSCOPIC, DIAGNOSTIC: ICD-10-PCS

## 2019-10-03 PROCEDURE — 0B9F8ZX DRAINAGE OF RIGHT LOWER LUNG LOBE, VIA NATURAL OR ARTIFICIAL OPENING ENDOSCOPIC, DIAGNOSTIC: ICD-10-PCS

## 2019-10-03 PROCEDURE — 0BD58ZX EXTRACTION OF RIGHT MIDDLE LOBE BRONCHUS, VIA NATURAL OR ARTIFICIAL OPENING ENDOSCOPIC, DIAGNOSTIC: ICD-10-PCS

## 2019-10-03 PROCEDURE — 0B9D8ZX DRAINAGE OF RIGHT MIDDLE LUNG LOBE, VIA NATURAL OR ARTIFICIAL OPENING ENDOSCOPIC, DIAGNOSTIC: ICD-10-PCS

## 2019-10-03 RX ADMIN — METHYLPREDNISOLONE SODIUM SUCCINATE SCH MG: 40 INJECTION, POWDER, FOR SOLUTION INTRAMUSCULAR; INTRAVENOUS at 20:25

## 2019-10-03 RX ADMIN — INSULIN ASPART SCH: 100 INJECTION, SOLUTION INTRAVENOUS; SUBCUTANEOUS at 06:04

## 2019-10-03 RX ADMIN — PIPERACILLIN AND TAZOBACTAM SCH MLS/HR: 3; .375 INJECTION, POWDER, FOR SOLUTION INTRAVENOUS at 22:30

## 2019-10-03 RX ADMIN — PIPERACILLIN AND TAZOBACTAM SCH MLS/HR: 3; .375 INJECTION, POWDER, FOR SOLUTION INTRAVENOUS at 06:17

## 2019-10-03 RX ADMIN — IPRATROPIUM BROMIDE AND ALBUTEROL SULFATE SCH ML: .5; 3 SOLUTION RESPIRATORY (INHALATION) at 15:27

## 2019-10-03 RX ADMIN — POTASSIUM BICARBONATE SCH MEQ: 782 TABLET, EFFERVESCENT ORAL at 22:26

## 2019-10-03 RX ADMIN — METHYLPREDNISOLONE SODIUM SUCCINATE SCH MG: 40 INJECTION, POWDER, FOR SOLUTION INTRAMUSCULAR; INTRAVENOUS at 09:08

## 2019-10-03 RX ADMIN — ISODIUM CHLORIDE PRN MG: 0.03 SOLUTION RESPIRATORY (INHALATION) at 04:29

## 2019-10-03 RX ADMIN — POTASSIUM BICARBONATE SCH MEQ: 782 TABLET, EFFERVESCENT ORAL at 22:27

## 2019-10-03 RX ADMIN — INSULIN ASPART SCH: 100 INJECTION, SOLUTION INTRAVENOUS; SUBCUTANEOUS at 13:37

## 2019-10-03 RX ADMIN — IPRATROPIUM BROMIDE AND ALBUTEROL SULFATE SCH ML: .5; 3 SOLUTION RESPIRATORY (INHALATION) at 10:47

## 2019-10-03 RX ADMIN — MORPHINE SULFATE PRN MG: 4 INJECTION, SOLUTION INTRAMUSCULAR; INTRAVENOUS at 00:50

## 2019-10-03 RX ADMIN — INSULIN ASPART SCH: 100 INJECTION, SOLUTION INTRAVENOUS; SUBCUTANEOUS at 00:00

## 2019-10-03 RX ADMIN — MORPHINE SULFATE PRN MG: 4 INJECTION, SOLUTION INTRAMUSCULAR; INTRAVENOUS at 22:31

## 2019-10-03 RX ADMIN — POTASSIUM CHLORIDE SCH MLS/HR: 14.9 INJECTION, SOLUTION INTRAVENOUS at 09:08

## 2019-10-03 RX ADMIN — INSULIN ASPART SCH: 100 INJECTION, SOLUTION INTRAVENOUS; SUBCUTANEOUS at 18:48

## 2019-10-03 RX ADMIN — PANTOPRAZOLE SODIUM SCH MG: 40 INJECTION, POWDER, FOR SOLUTION INTRAVENOUS at 09:07

## 2019-10-03 RX ADMIN — ENOXAPARIN SODIUM SCH MG: 40 INJECTION SUBCUTANEOUS at 09:07

## 2019-10-03 RX ADMIN — IPRATROPIUM BROMIDE AND ALBUTEROL SULFATE SCH ML: .5; 3 SOLUTION RESPIRATORY (INHALATION) at 06:55

## 2019-10-03 RX ADMIN — IPRATROPIUM BROMIDE AND ALBUTEROL SULFATE SCH ML: .5; 3 SOLUTION RESPIRATORY (INHALATION) at 19:28

## 2019-10-03 RX ADMIN — POTASSIUM CHLORIDE SCH MLS/HR: 14.9 INJECTION, SOLUTION INTRAVENOUS at 06:47

## 2019-10-03 RX ADMIN — PIPERACILLIN AND TAZOBACTAM SCH MLS/HR: 3; .375 INJECTION, POWDER, FOR SOLUTION INTRAVENOUS at 14:17

## 2019-10-03 NOTE — P.PN
Subjective


Progress Note Date: 10/03/19


Principal diagnosis: 





Acute on chronic hypoxic and hypercapnic respiratory failure








 This is a 54-year-old female patient of Dr. Jerome, who was recently 

hospitalized for acute respiratory failure secondary to aspiration pneumonia, 

hypoxemic and hypercapnic requiring intubation and placement on mechanical 

ventilation.  Patient was successfully extubated, was treated with antibiotics, 

completed a course of Levaquin and Zosyn.  Patient showed significant clinical 

and radiographic improvement, by the time of her discharge to the [facility].  Patient 

also has history of laryngeal cancer with previous chemoradiation, and had 

evidence of severe malnutrition.  In addition patient failed modified barium 

swallow, and was having evidence of aspiration with all consistencies.  Patient 

also had evidence of electrolyte abnormality with hyponatremia and hypokalemia, 

which improved with treatment.  Patient had a gastrostomy tube placed for 

nutritional support, and she was strict NPO.  Sputum cultures from previous 

admission were negative.  Other medical history includes chronic anemia of 

chronic disease, previous tobacco dependence.  Patient was discharged to the 

[facility] on 09/25/2019 however she started experiencing shortness 

of breath at the [facility], and was apparently hypoxic and was brought back for 

evaluation on oral 09/26/2019.  He denied any chest pain, she denies any oral 

intake at the nursing home home, she states she was getting her nutrition 

strictly through the gastrostomy tube.  No aspiration, no fever or chills, chest

x-ray showed basilar atelectasis and associated pleural effusions.  Lab work 

showed white blood cell count of 14.0, hemoglobin of 11, correlation profile was

within normal limits, blood gas showed pO2 of 64, pCO2 of 67, and pH of 7.32.  

Sodium was 140, potassium is 4.0, chloride is 98, CO2 37, B1 is 21, creatinine 

0.24.  O2 sat in the emergency department was ranging from 84-86 patient was 

placed on supplemental oxygen.  Patient was given IV steroids, nebulized 

bronchodilators, and worsening the patient in consultation for shortness of 

breath.  She is awake and alert, she is sitting up in bed, appears to be in no 

acute distress, currently on 2 L of oxygen, no significant wheezing, or 

congestion, she does have a weak cough, which does not sound congested, lung 

sounds are diminished, no rales auscultated.  Follow-up blood work was reviewed 

today, white blood cell count is 15.5, hemoglobin is 9.7, repeat blood gas 

showed pO2 of 60, pCO2 59, and pH of 7.39 this was done on FiO2 28%.  Patient 

looks extremely cachectic and weak, her voice is very weak and hoarse, but she 

appears to be in no acute distress.





On 09/28/2019 I'm seeing this patient for a follow-up.  This is an extremely 

debilitated and severely malnourished female patient who is post respiratory 

failure who was readmitted for ongoing difficulties in breathing.  Noted the 

patient's BMI is 11.9 and she has kwashiorkor.  The patient got moved yesterday 

to the intensive care unit because of worsening shortness of breath and altered 

mentation.  Note that on the floor the patient had a blood gas showing a pH of  

0.28 with a pCO2 of 83 and pO2 of 72.  This was on FiO2 of 32%.  She was having 

increased tachypnea and respiratory distress.  At that point the patient got 

transferred to the intensive care unit.  I was very hesitant intubated this 

patient based on an underlying condition and status.  Note that the patient is 

extremely frail and has absolutely no muscle mass and intubation may be quite 

devastating for this patient had the patient accordingly was placed on a BiPAP 

at a pressure of 10/5 cm of water.  She was also given some Ativan and morphine 

which.  Much calm that down.  Her subsequent blood gas showed a pH of 7.38 with 

a pCO2 of 69 a pO2 of 62.  I realized the patient's respiratory insufficiency he

had it performed a CAT scan of the chest yesterday and the CAT scan showed 

complete occlusion of the distal bronchus intermedius and right middle lobe 

atelectasis.  There was also atelectatic changes in lung bases bilaterally with 

a right middle lobe atelectasis right lower lobe atelectasis and bilateral 

pleural effusions.  Upper lobes are quite patent and well hydrated.  The patient

was started back on IV Zosyn.  She did become hypotensive briefly and she was 

given a liter of IV fluid bolus and currently she is on normal saline at rate of

100 mL an hour.  She is improved her blood pressure and she did not require any 

pressors.  She is currently awake.  She is following commands.  However, she has

a very poor insight on her condition.  She is not aware that she is quite 

debilitated in [place] major disadvantage for recovering from her chronic 

illness.  The patient also is receiving enteral feeding for nutritional support 

in the form of vitamin 1.2 which is currently at goal.  I added this patient IV 

Zosyn.  She is on Lovenox for DVT prophylaxis.  CAT scan of the chest also 

showed emphysema bilaterally.  She is on bronchodilators.  She is also on IV 

Solu-Medrol.  IV Protonix for GI prophylaxis.  





On 09/29/2019, the patient is being seen in follow-up she is awake and alert.  

She is following commands.  She is on a BiPAP at a pressure of 10/5 cm of water 

with an FiO2 of 40%.  The patient is very much compliant and tolerating her 

BiPAP.  No significant cough or sputum production.  The chest x-ray from today 

shows small bilateral pleural effusion, right middle lobe/right lower lobe a

telectasis.  Her pro-calcitonin level was low.  She is covered with IV Zosyn.  

She is also covered with IV Solu-Medrol and the dose was reduced down to 40 mg 

every 12 hours.  He is on DuoNeb nebulized treatments around the clock and she 

is receiving enteral feeding for nutritional support.  Sodium level is up to 

147.  Atelectatic discussion with the patient's family.  The family wants 

everything done including the possibility of long-term vent support.  In that 

case, it made recommendations to proceed with a tracheostomy and the patient is 

unable to breathe independently without any form of respiratory  assisting 

devices and the patient is quite debilitated and has significant malnutrition a

nd neuromuscular weakness.  





Patient was reevaluated today on 9/30/2019, patient remains on BiPAP, presently 

at a pressure of 10/5 and FiO2 of 40%.  She seems to be almost BiPAP dependent. 

Her chest x-ray is showing significant worsening of the right lower lobe and 

right lobe collapse and atelectasis, there is also some component of pleural 

effusion.  Remains on antibiotics in the form of Zosyn, patient is on 

bronchodilators is also on IV Solu-Medrol.  Not much of a change over the last 

24 hours.  Apparently the patient is to be seen by surgery today, and Dr. Liu recommended tracheostomy which will likely be done tomorrow or in the 

next couple of days.  Patient will be bronchoscoped after tracheostomy and 

mechanical ventilation.  And she read he has a PEG tube in place.  Presently the

patient is about the same, and not much change over the last 24 hours.  WBC 

count is 9.5 hemoglobin 9.6 electrolytes are normal bicarb is 37 renal 

functioning is normal.  Patient was already seen by general surgery, and surgery

is being considered for either tomorrow or Wednesday.





Patient was reevaluated today on 10/1/2019, remains on BiPAP this morning, 

scheduled to undergo tracheostomy this morning.  Patient again seems to be 

almost BiPAP dependent.  Chest x-ray continues to show evidence of right lower 

lobe and right lobe collapse and atelectasis.  Small tiny pleural effusion is 

noted.  Remains on antibiotics, bronchodilators, IV Solu-Medrol, and again the 

patient is scheduled for tracheostomy today.  Family is at bedside, and I 

discussed the option of bronchoscopy after tracheostomy which I will likely 

perform tomorrow on this patient to evaluate the right middle lobe and right l

ower lobe.





Patient was reevaluated today on 10/2/2019, she underwent tracheostomy 

yesterday, presently the patient is on mechanical ventilation, she is on before 

meals mode of 12 tidal volume of 350 FiO2 is down to 50%, and PEEP is 5.  

Patient seems to be very calm, comfortable, very appropriate, and her chest x-

ray continues to show significant right lower lobe and right middle lobe 

collapse.  Hence considering the patient is now on mechanical ventilation, I 

have discussed with the patient the option of bronchoscopy and evaluation of the

right middle lobe and right lower lobe, and she is agreeable to proceed with the

procedure today.  The bronchoscopy team was notified, and it will be done 

sometime this afternoon.  In the meantime patient remains nothing by mouth, her 

PEG tube feeding is on hold, and will empty her stomach. ABG this morning showed

a pO2 of 53 pCO2 of 38 pH of 7.54.  Electrolytes are normal renal profile is 

normal potassium is a bit low at 3.3 WBC count is 10.3 hemoglobin is 9.3.





Patient was reevaluated today on 10/3/2019, she is postoperative day #1, patient

underwent tracheostomy placement for chronic respiratory failure and she is 

presently on assist control rate of 12, FiO2 of 35%, tidal volume of 350.  PEEP 

is 5.  Patient underwent bronchoscopy yesterday, and purulent secretions were 

suctioned out of the right middle lobe and right lower lobe.  Microbiology on 

the BAL is pending.  Patient seems to be very comfortable on mechanical 

ventilation.  Not requiring any propofol.  Hence I recommended a trial of 

pressure support of 8 and CPAP to be given today.  And depending on how she 

tolerates the weaning trial, may or may not proceed to trach collar.  If the 

patient fails weaning may proceed with transferring the patient to a select care

specialty for further weaning down the line.  Chest x-ray showed slight 

improvement in the aeration of the right lower lobe continues to have a small 

right pleural effusion.  CBC is relatively normal hemoglobin is 8.5 electrolytes

are normal renal profile is normal.





Objective





- Vital Signs


Vital signs: 


                                   Vital Signs











Temp  98 F   10/03/19 08:00


 


Pulse  96   10/03/19 11:00


 


Resp  28 H  10/03/19 11:00


 


BP  109/79   10/03/19 11:00


 


Pulse Ox  98   10/03/19 11:00








                                 Intake & Output











 10/02/19 10/03/19 10/03/19





 18:59 06:59 18:59


 


Intake Total 785 750 700


 


Output Total 1446 510 165


 


Balance -661 240 535


 


Weight 37.2 kg 37.1 kg 


 


Intake:   


 


   220 350


 


    .9 120 120 50


 


    Piperacillin-Tazobactam 3 300 100 100





    .375 gm In Sodium   





    Chloride 0.9% 100 ml @ 25   





    mls/hr IVPB Q8H [facility] Rx#:   





    [id number]   


 


    Potassium Chloride 20 meq   100





    In Water For Injection 1   





    100ml.bag @ 50 mls/hr   





    IVPB Q2H [facility] Rx#:   





    [id number]   


 


    Potassium Chloride 20 meq 100  





    In Water For Injection 1   





    100ml.bag @ 50 mls/hr   





    IVPB Q2H [facility] Rx#:   





    [id number]   


 


    Potassium Chloride 20 meq   100





    In Water For Injection 1   





    100ml.bag @ 50 mls/hr   





    IVPB Q2H [facility] Rx#:   





    [id number]   


 


  Intake, IV Titration 0  





  Amount   


 


    Propofol 1,000 mg In 0  





    Empty Bag 1 bag @ Titrate   





    IV .Q0M [facility] Rx#:   





    [id number]   


 


  Tube Feeding 180 440 320


 


  Other 60 90 30


 


Output:   


 


  Urine 1446 510 165


 


Other:   


 


  Voiding Method Indwelling Catheter Indwelling Catheter Indwelling Catheter














- Exam





Physical Exam: 54-year-old on mechanical ventilation, in no distress.  Fully 

awake and responsive


Head: Atraumatic, normocephalic.  Looks cachectic, on mechanical ventilation.  

Tracheostomy tube is intact.


HEENT:[Neck is supple.] [No neck masses.] [No thyromegaly.] [No JVD.]  PERRLA, 

EOMI, no icterus.


Chest: Slightly diminished breath sounds at the right base, otherwise 

unremarkable.  Symmetrical chest expansion is noted.


Cardiac Exam: [Normal S1 and S2, no S3 gallop, no murmur.]


Abdomen: [Soft, nontender,  no megaly, no rebound, no guarding, normal bowel 

sounds.]


Extremities: [No clubbing, no edema, no cyanosis.]


Neurological Exam: [No focal neurologic deficit.]  Alert and oriented 3.


Psychiatric: Normal mood affect and normal mental status examination.


Skin: No rashes.


Spine: No scoliosis.


Musculoskeletal: Muscle wasting is noted otherwise unremarkable.





- Labs


CBC & Chem 7: 


                                 10/03/19 05:39





                                 10/03/19 05:39


Labs: 


                  Abnormal Lab Results - Last 24 Hours (Table)











  10/02/19 10/02/19 10/02/19 Range/Units





  11:59 18:00 23:58 


 


RBC     (3.80-5.40)  m/uL


 


Hgb     (11.4-16.0)  gm/dL


 


Hct     (34.0-46.0)  %


 


MCV     (80.0-100.0)  fL


 


MCHC     (31.0-37.0)  g/dL


 


Lymphocytes #     (1.0-4.8)  k/uL


 


Potassium     (3.5-5.1)  mmol/L


 


Carbon Dioxide     (22-30)  mmol/L


 


BUN     (7-17)  mg/dL


 


Creatinine     (0.52-1.04)  mg/dL


 


Glucose     (74-99)  mg/dL


 


POC Glucose (mg/dL)  104 H  103 H  123 H  (75-99)  mg/dL


 


Calcium     (8.4-10.2)  mg/dL














  10/03/19 10/03/19 10/03/19 Range/Units





  05:39 05:39 06:01 


 


RBC  2.66 L    (3.80-5.40)  m/uL


 


Hgb  8.5 L    (11.4-16.0)  gm/dL


 


Hct  27.6 L    (34.0-46.0)  %


 


MCV  104.0 H    (80.0-100.0)  fL


 


MCHC  30.9 L    (31.0-37.0)  g/dL


 


Lymphocytes #  0.2 L    (1.0-4.8)  k/uL


 


Potassium   3.4 L   (3.5-5.1)  mmol/L


 


Carbon Dioxide   33 H   (22-30)  mmol/L


 


BUN   19 H   (7-17)  mg/dL


 


Creatinine   0.25 L   (0.52-1.04)  mg/dL


 


Glucose   116 H   (74-99)  mg/dL


 


POC Glucose (mg/dL)    126 H  (75-99)  mg/dL


 


Calcium   8.1 L   (8.4-10.2)  mg/dL








                      Microbiology - Last 24 Hours (Table)











 10/02/19 15:00 Gram Stain - Preliminary





 Bronchoalviolar Lavage - Right Bronchial Washings Culture - Preliminary


 


 10/02/19 15:00 Fungal Culture - Preliminary





 Bronchoalviolar Lavage - Right 


 


 10/02/19 15:00 Acid Fast Bacilli Culture - Preliminary





 Bronchoalviolar Lavage - Right 


 


 09/26/19 12:40 Blood Culture - Final





 Blood    No Growth after 144 hours














Assessment and Plan


Assessment: 





Impression:





1 acute on chronic hypoxic respiratory failure, status post tracheostomy, 

postoperative day #1, remains mechanically ventilated.





2 severe underlying COPD





3 history of laryngeal cancer and previous chemoradiation therapy





4 severe protein calorie malnutrition BMI of 11.9





5 chronic smoking





6 chronic anemia of chronic disease





7 right lower lobe and right middle lobe collapse, status post bronchoscopy 

yesterday, no evidence of endobronchial tumor, purulent secretions noted in the 

right middle lobe and right lower lobe, suctioned, lavaged, and sent for 

different diagnostic studies.





8 chronic hypoxic and hypercapnic respiratory failure, presently on mechanical 

ventilation. 





 Recommendation:


Continue  tracheostomy care.


Continue mechanical ventilation, however the patient will be given a trial of 

weaning with a pressure support and CPAP.


Continue enteral feeding via PEG tube which is already in place.


Continue GI and DVT prophylaxis.  


Continue bronchodilators.  


Continue antibiotics,


Trial of weaning with up support and CPAP today.


Consider pacing the patient on trach collar if pressure support and CPAP as well

tolerated.


Discussed today her condition with her and her family members at bedside, also 

with discharge planning, patient may eventually require referral to a select 

care specialty.  We'll continue to follow.









































Time with Patient: Less than 30

## 2019-10-03 NOTE — PCN
PROCEDURE NOTE



OPERATIVE REPORT:

Bronchoscopy and bronchoalveolar lavage of the right middle lobe and the right lower

lobe as well as bronchoalveolar lavage of the left lower lobe.



PREOPERATIVE DIAGNOSIS:

Chronic collapse of right middle lobe and right lower lobe.



POSTOPERATIVE DIAGNOSIS:

Chronic collapse of right middle lobe and right lower lobe.



ANESTHESIA USED:

IV conscious sedation, patient received a total of 75 mg of propofol and 0.5 mg of

Dilaudid prior to the procedure.



PROCEDURE:

The patient was placed in the supine position, she was already connected to mechanical

ventilation and she had a tracheostomy in place.  An adapter was applied to the

tracheostomy and it was connected to mechanical ventilation.  After adequate IV

conscious sedation, the bronchoscope, which was a small lumen bronchoscope, a 5 mm

bronchoscope _____ and this was a disposable bronchoscope.  The bronchoscope was

advanced through the adapter down to the area of the distal tracheostomy tube.  In the

meantime, the patient was monitored, her O2 saturation was continuously monitored,

blood pressure was intermittently monitored, and cardiac rhythm was continuously

monitored.  The bronchoscope was advanced further down and a thorough examination was

done of the right upper lobe, right middle lobe, right lower lobe, left upper lobe,

lingula and left lower lobe.  There was no evidence of any significant secretions in

the right upper lobe.  However, the right middle lobe and right lower lobe were

occluded with purulent secretions which were easy to suction.  The right middle lobe

and right lower lobe were lavaged, and all the purulent secretions were removed from

the area.  Then, we moved up on the left side and the left upper lobe was unremarkable,

lingula was also unremarkable.  Purulent secretions noted in the left lower lobe, and

these were suctioned easily.  Procedure was well tolerated, no evidence of any

immediate complication.





MMODL / IJN: [id number] / Job#: [id number]

## 2019-10-03 NOTE — P.OP
Date of Procedure: 10/01/19


Preoperative Diagnosis: 


Respiratory failure


Postoperative Diagnosis: 


Respiratory failure


Procedure(s) Performed: 


Tracheostomy


Anesthesia: KATHRYN


Surgeon: Deep Wright


Pathology: none sent


Condition: stable


Disposition: ICU


Description of Procedure: 


The patient's placed on the operative table in supine position.  Her neck was 

extended.  Her neck was prepped and draped usual sterile fashion.  A standard 

[place] incision was made approximately 2 cm above the sternal notch.  Using 

electrocautery the platysma was divided and the strap muscles were exposed.  A 

wheal entered transversely.  The strap muscles were divided in the midline.  And

then the trachea was exposed.  The tracheotomy performed between the second and 

third tracheal rings.  A #7 tracheostomy tube was placed in the patient's 

trachea.  The tracheostomy was secured umbilical tape after the skin was closed 

with 2-0 nylon suture.  Patient top she will was sent to recovery room stable 

condition.

## 2019-10-03 NOTE — P.PN
Subjective


Progress Note Date: 10/03/19


Principal diagnosis: 





Acute on chronic hypoxic respiratory failure





Patient was seen and examined.  No acute events overnight.  Postop day 2 

tracheostomy. Underwent bronchoscopy yesterday, purulent secretions evacuated 

with microbiology pending. Currently on vent settings tidal volume 350, FiO2 35 

PEEP 5 and rate of 12.





Objective





- Vital Signs


Vital signs: 


                                   Vital Signs











Temp  98.1 F   10/03/19 12:00


 


Pulse  96   10/03/19 12:00


 


Resp  22   10/03/19 12:00


 


BP  115/84   10/03/19 12:00


 


Pulse Ox  93 L  10/03/19 12:00








                                 Intake & Output











 10/02/19 10/03/19 10/03/19





 18:59 06:59 18:59


 


Intake Total 785 750 870


 


Output Total 1446 510 400


 


Balance -661 240 470


 


Weight 37.2 kg 37.1 kg 


 


Intake:   


 


   220 370


 


    .9 120 120 70


 


    Piperacillin-Tazobactam 3 300 100 100





    .375 gm In Sodium   





    Chloride 0.9% 100 ml @ 25   





    mls/hr IVPB Q8H [facility] Rx#:   





    [id number]   


 


    Potassium Chloride 20 meq   100





    In Water For Injection 1   





    100ml.bag @ 50 mls/hr   





    IVPB Q2H [facility] Rx#:   





    [id number]   


 


    Potassium Chloride 20 meq 100  





    In Water For Injection 1   





    100ml.bag @ 50 mls/hr   





    IVPB Q2H [facility] Rx#:   





    [id number]   


 


    Potassium Chloride 20 meq   100





    In Water For Injection 1   





    100ml.bag @ 50 mls/hr   





    IVPB Q2H [facility] Rx#:   





    [id number]   


 


  Intake, IV Titration 0  





  Amount   


 


    Propofol 1,000 mg In 0  





    Empty Bag 1 bag @ Titrate   





    IV .Q0M [facility] Rx#:   





    [id number]   


 


  Tube Feeding 180 440 440


 


  Other 60 90 60


 


Output:   


 


  Urine 1446 510 400


 


Other:   


 


  Voiding Method Indwelling Catheter Indwelling Catheter Indwelling Catheter














- Exam





General: [Cachectic], [sedated, tracheostomy site clean and dry], [appears at 

stated age]


Derm: [warm], [dry]


Head: [atraumatic], [normocephalic], [bitemporal wasting]


Eyes:  [no lid lag], [anicteric sclera]


Mouth: [no lip lesion], [mucus membranes moist]


Cardiovascular: [S1S2 reg], [tachycardic], [positive DP pulse bilateral], 


Lungs: [Decreased breath sounds bilateral], [no rhonchi, no rales] , [no 

accessory muscle use]


Abdominal: [soft], [ nontender to palpation], [no guarding], [Anderson catheter in 

place]


Ext: [no gross muscle atrophy], [no edema], [no contractures]


Neuro: [no focal neuro deficits]


Psych: [Sedated]





- Labs


CBC & Chem 7: 


                                 10/03/19 05:39





                                 10/03/19 05:39


Labs: 


                  Abnormal Lab Results - Last 24 Hours (Table)











  10/02/19 10/02/19 10/03/19 Range/Units





  18:00 23:58 05:39 


 


RBC    2.66 L  (3.80-5.40)  m/uL


 


Hgb    8.5 L  (11.4-16.0)  gm/dL


 


Hct    27.6 L  (34.0-46.0)  %


 


MCV    104.0 H  (80.0-100.0)  fL


 


MCHC    30.9 L  (31.0-37.0)  g/dL


 


Lymphocytes #    0.2 L  (1.0-4.8)  k/uL


 


Potassium     (3.5-5.1)  mmol/L


 


Carbon Dioxide     (22-30)  mmol/L


 


BUN     (7-17)  mg/dL


 


Creatinine     (0.52-1.04)  mg/dL


 


Glucose     (74-99)  mg/dL


 


POC Glucose (mg/dL)  103 H  123 H   (75-99)  mg/dL


 


Calcium     (8.4-10.2)  mg/dL














  10/03/19 10/03/19 10/03/19 Range/Units





  05:39 06:01 11:51 


 


RBC     (3.80-5.40)  m/uL


 


Hgb     (11.4-16.0)  gm/dL


 


Hct     (34.0-46.0)  %


 


MCV     (80.0-100.0)  fL


 


MCHC     (31.0-37.0)  g/dL


 


Lymphocytes #     (1.0-4.8)  k/uL


 


Potassium  3.4 L    (3.5-5.1)  mmol/L


 


Carbon Dioxide  33 H    (22-30)  mmol/L


 


BUN  19 H    (7-17)  mg/dL


 


Creatinine  0.25 L    (0.52-1.04)  mg/dL


 


Glucose  116 H    (74-99)  mg/dL


 


POC Glucose (mg/dL)   126 H  132 H  (75-99)  mg/dL


 


Calcium  8.1 L    (8.4-10.2)  mg/dL








                      Microbiology - Last 24 Hours (Table)











 10/02/19 15:00 Gram Stain - Preliminary





 Bronchoalviolar Lavage - Right Bronchial Washings Culture - Preliminary


 


 10/02/19 15:00 Fungal Culture - Preliminary





 Bronchoalviolar Lavage - Right 


 


 10/02/19 15:00 Acid Fast Bacilli Culture - Preliminary





 Bronchoalviolar Lavage - Right 


 


 09/26/19 12:40 Blood Culture - Final





 Blood    No Growth after 144 hours














Assessment and Plan


Assessment: 





Assessment and Plan





Acute on chronic hypoxic respiratory failure with hypercapnia due to laryngeal 

cancer, pleural effusion and COPD


Severe protein calorie malnutrition with cachexia with G-tube due to dysphagia 

BMI 12.4


Mediastinal lymphadenopathy


Anemia with thrombocytosis


Hypokalemia








Chest CT shows increased pleural effusion, right middle lobe pneumonia versus 

atelectasis.  Pro-calcitonin negative, low concerns for pneumonia.  POD 2 

tracheostomy. POD 1 post bronchoscopy. Plans:  Continue full ventilator support 

managed by pulmonology, attempt to wean today.  Continue Zosyn for empiric 

treatment of pneumonia and aspiration while waiting on BAL cultures.  Continue 

Solu-Medrol 40 mg IV twice a day along with DuoNeb as needed for shortness of 

breath and wheezing for treatment of COPD.  Telemetry monitoring.  Head of bed 

elevation.  Pulmonology and general surgery on board.





BMI 12.8.  Plans: Continue G-tube feeds.  Consult dietitian.





As seen on previous CT chest.  Plans: Will likely need bronchoscopy with 

pulmonology after trach.  Follow pulmonology recommendations.





Hemoglobin 9.3-8.5.  Acute blood loss from surgery.  Macrocytic during 

admission.  Thrombocytosis of 470 likely from iron deficiency. Iron studies 

shows iron deficiency.  B12 and folate within normal limits.  Plans: Hemoglobin 

stable. Transfuse if hemoglobin less than 7.  Daily CBC.





Potassium 3.4.  Plans: Replace via protocol.





[Patient admitted for acute on chronic hypoxic respiratory failure due to 

multiple reasons.  She is POD 2 tracheostomy.  POD 1 bronchoscopy, cultures 

pending.   following for LTAC placement.  Cleared for DC per 

pulmonology, pending insurance acceptance for LTAC.]

## 2019-10-03 NOTE — XR
EXAMINATION TYPE: XR chest 1V

 

DATE OF EXAM: 10/3/2019

 

COMPARISON: 10/2/2019

 

HISTORY: Shortness of breath

 

TECHNIQUE: Single frontal view of the chest is obtained.

 

FINDINGS:  Bilateral consolidation and pleural effusion greater on the right is stable. Mediport cath
eter noted there is underlying COPD. Biapical pleural thickening noted throughout the right stable. L
eft-sided PICC line noted. Surgical staples in the midline with a catheter seen overlying the left up
per quadrant which may be related to gastrostomy tube. Tracheostomy tube seen. Surgical staples overl
brenden the upper abdomen.

 

 

IMPRESSION:  

1. Bilateral infiltrate and pleural effusion and asymmetric right apical pleural thickening or mass. 
Stable. 

2. Tracheostomy tube appears in good position.

## 2019-10-04 LAB
ANION GAP SERPL CALC-SCNC: 3 MMOL/L
BASOPHILS # BLD AUTO: 0 K/UL (ref 0–0.2)
BASOPHILS NFR BLD AUTO: 0 %
BUN SERPL-SCNC: 16 MG/DL (ref 7–17)
CALCIUM SPEC-MCNC: 8.2 MG/DL (ref 8.4–10.2)
CHLORIDE SERPL-SCNC: 103 MMOL/L (ref 98–107)
CO2 SERPL-SCNC: 32 MMOL/L (ref 22–30)
EOSINOPHIL # BLD AUTO: 0.1 K/UL (ref 0–0.7)
EOSINOPHIL NFR BLD AUTO: 1 %
ERYTHROCYTE [DISTWIDTH] IN BLOOD BY AUTOMATED COUNT: 2.65 M/UL (ref 3.8–5.4)
ERYTHROCYTE [DISTWIDTH] IN BLOOD: 15.5 % (ref 11.5–15.5)
GLUCOSE BLD-MCNC: 107 MG/DL (ref 75–99)
GLUCOSE BLD-MCNC: 128 MG/DL (ref 75–99)
GLUCOSE BLD-MCNC: 139 MG/DL (ref 75–99)
GLUCOSE BLD-MCNC: 144 MG/DL (ref 75–99)
GLUCOSE BLD-MCNC: 97 MG/DL (ref 75–99)
GLUCOSE SERPL-MCNC: 105 MG/DL (ref 74–99)
HCT VFR BLD AUTO: 27.5 % (ref 34–46)
HGB BLD-MCNC: 8.5 GM/DL (ref 11.4–16)
LYMPHOCYTES # SPEC AUTO: 0.2 K/UL (ref 1–4.8)
LYMPHOCYTES NFR SPEC AUTO: 3 %
MAGNESIUM SPEC-SCNC: 1.7 MG/DL (ref 1.6–2.3)
MCH RBC QN AUTO: 31.9 PG (ref 25–35)
MCHC RBC AUTO-ENTMCNC: 30.9 G/DL (ref 31–37)
MCV RBC AUTO: 103.5 FL (ref 80–100)
MONOCYTES # BLD AUTO: 0.3 K/UL (ref 0–1)
MONOCYTES NFR BLD AUTO: 4 %
NEUTROPHILS # BLD AUTO: 6 K/UL (ref 1.3–7.7)
NEUTROPHILS NFR BLD AUTO: 91 %
PLATELET # BLD AUTO: 267 K/UL (ref 150–450)
POTASSIUM SERPL-SCNC: 3.8 MMOL/L (ref 3.5–5.1)
SODIUM SERPL-SCNC: 138 MMOL/L (ref 137–145)
WBC # BLD AUTO: 6.5 K/UL (ref 3.8–10.6)

## 2019-10-04 RX ADMIN — IPRATROPIUM BROMIDE AND ALBUTEROL SULFATE SCH ML: .5; 3 SOLUTION RESPIRATORY (INHALATION) at 07:33

## 2019-10-04 RX ADMIN — INSULIN ASPART SCH: 100 INJECTION, SOLUTION INTRAVENOUS; SUBCUTANEOUS at 00:01

## 2019-10-04 RX ADMIN — PIPERACILLIN AND TAZOBACTAM SCH MLS/HR: 3; .375 INJECTION, POWDER, FOR SOLUTION INTRAVENOUS at 17:14

## 2019-10-04 RX ADMIN — MAGNESIUM SULFATE IN DEXTROSE SCH MLS/HR: 10 INJECTION, SOLUTION INTRAVENOUS at 13:59

## 2019-10-04 RX ADMIN — MORPHINE SULFATE PRN MG: 4 INJECTION, SOLUTION INTRAMUSCULAR; INTRAVENOUS at 04:15

## 2019-10-04 RX ADMIN — METHYLPREDNISOLONE SODIUM SUCCINATE SCH MG: 40 INJECTION, POWDER, FOR SOLUTION INTRAMUSCULAR; INTRAVENOUS at 08:04

## 2019-10-04 RX ADMIN — MORPHINE SULFATE PRN MG: 4 INJECTION, SOLUTION INTRAMUSCULAR; INTRAVENOUS at 18:21

## 2019-10-04 RX ADMIN — ENOXAPARIN SODIUM SCH MG: 40 INJECTION SUBCUTANEOUS at 08:04

## 2019-10-04 RX ADMIN — MAGNESIUM SULFATE IN DEXTROSE SCH MLS/HR: 10 INJECTION, SOLUTION INTRAVENOUS at 12:28

## 2019-10-04 RX ADMIN — INSULIN ASPART SCH UNIT: 100 INJECTION, SOLUTION INTRAVENOUS; SUBCUTANEOUS at 13:01

## 2019-10-04 RX ADMIN — METHYLPREDNISOLONE SODIUM SUCCINATE SCH MG: 40 INJECTION, POWDER, FOR SOLUTION INTRAMUSCULAR; INTRAVENOUS at 20:49

## 2019-10-04 RX ADMIN — PIPERACILLIN AND TAZOBACTAM SCH MLS/HR: 3; .375 INJECTION, POWDER, FOR SOLUTION INTRAVENOUS at 06:34

## 2019-10-04 RX ADMIN — MORPHINE SULFATE PRN MG: 4 INJECTION, SOLUTION INTRAMUSCULAR; INTRAVENOUS at 23:08

## 2019-10-04 RX ADMIN — ISODIUM CHLORIDE PRN MG: 0.03 SOLUTION RESPIRATORY (INHALATION) at 01:02

## 2019-10-04 RX ADMIN — IPRATROPIUM BROMIDE AND ALBUTEROL SULFATE SCH ML: .5; 3 SOLUTION RESPIRATORY (INHALATION) at 15:12

## 2019-10-04 RX ADMIN — ISODIUM CHLORIDE PRN MG: 0.03 SOLUTION RESPIRATORY (INHALATION) at 03:31

## 2019-10-04 RX ADMIN — PIPERACILLIN AND TAZOBACTAM SCH MLS/HR: 3; .375 INJECTION, POWDER, FOR SOLUTION INTRAVENOUS at 23:09

## 2019-10-04 RX ADMIN — INSULIN ASPART SCH: 100 INJECTION, SOLUTION INTRAVENOUS; SUBCUTANEOUS at 18:28

## 2019-10-04 RX ADMIN — IPRATROPIUM BROMIDE AND ALBUTEROL SULFATE SCH ML: .5; 3 SOLUTION RESPIRATORY (INHALATION) at 19:04

## 2019-10-04 RX ADMIN — ISODIUM CHLORIDE PRN MG: 0.03 SOLUTION RESPIRATORY (INHALATION) at 22:57

## 2019-10-04 RX ADMIN — IPRATROPIUM BROMIDE AND ALBUTEROL SULFATE SCH ML: .5; 3 SOLUTION RESPIRATORY (INHALATION) at 10:54

## 2019-10-04 RX ADMIN — INSULIN ASPART SCH: 100 INJECTION, SOLUTION INTRAVENOUS; SUBCUTANEOUS at 06:31

## 2019-10-04 RX ADMIN — PANTOPRAZOLE SODIUM SCH MG: 40 INJECTION, POWDER, FOR SOLUTION INTRAVENOUS at 08:04

## 2019-10-04 NOTE — P.DS
Providers


Date of admission: 


09/26/19 15:55





Expected date of discharge: 10/04/19


Attending physician: 


Gentry Sky MD





Consults: 





                                        





09/27/19 10:55


Consult Physician Routine 


   Consulting Provider: Tereso Liu


   Consult Reason/Comments: copd exac, hypoxia


   Do you want consulting provider notified?: Already Contacted





09/29/19 14:19


Consult Physician Routine 


   Consulting Provider: Deep Wright


   Consult Reason/Comments: tracheostomy


   Do you want consulting provider notified?: Yes











Primary care physician: 


[facility]ENDRICKS [facility] Course: 





Patient is a 54-year-old female with PMH of laryngeal cancer post radiation and 

chemotherapy presented to the ED from [facility] for difficulty breathing and 

oxygen saturation into the low 80s.  





She had previously been admitted and discharged on 09/25/2019 after a long 

hospital course for which she was treated for acute hypoxic respiratory failure 

with hypercapnia requiring mechanical ventilation secondary to presumed 

aspiration pneumonia.  She received antibiotic coverage with Zosyn and 

levofloxacin at that time.  Sputum culture during that admission was negative.  

She also had severe hyponatremia and hypokalemia which were corrected.  Patient 

was noted to have dysphagia at that time with all consistencies along with 

severe protein calorie malnutrition for which open G-tube was placed on 

September 23.  In the ED, chest x-ray showed basilar atelectasis and associated 

effusion.  CT of the chest confirmed increased pleural effusion and associated 

basilar atelectasis, right middle lobe pneumonia versus atelectasis and emphysem

a.  Patient was treated with IV steroids, nebulized bronchodilators along with 

supplemental oxygen and pulmonology was consulted.  Pulmonology recommended 

obtaining a pro-calcitonin which was negative which gave low concerns for 

pneumonia.  Pulmonology also recommended tracheostomy as patient was chronically

BiPAP dependent and surgery was consulted.  Patient underwent tracheostomy on 

10/01/2019 without any complications.  Bronchoscopy with BAL was performed on 

10/02/2019 which showed some purulent secretions in the left lower lobe which 

were suctioned easily.





CPAP trials were attempted to wean the patient off of mechanical ventilation.  

Patient was cleared for discharge from a pulmonology standpoint.  Pulmonology 

had recommended continuation of tracheostomy care, enteral feeding via PEG tube,

GI and DVT prophylaxis, bronchodilators and antibiotics.





Patient was seen and examined.  No acute events overnight.  Patient is nonverbal

but appears to be in high spirits.  Patient reports improvement in her breathing

since bronchoscopy yesterday.  She denies chest pain or palpitations.  No nausea

or vomiting.  No fever or chills.  Currently attempting trial of CPAP.





General: [Cachectic], [on CPAP, tracheostomy site clean and dry], [appears at 

stated age]


Derm: [warm], [dry]


Head: [atraumatic], [normocephalic], [bitemporal wasting]


Eyes:  [no lid lag], [anicteric sclera]


Mouth: [no lip lesion], [mucus membranes moist]


Cardiovascular: [S1S2 reg], [tachycardic], [positive DP pulse bilateral], 


Lungs: [Decreased breath sounds bilateral], [no rhonchi, no rales] , [no 

accessory muscle use]


Abdominal: [soft], [ nontender to palpation], [no guarding], [Anderson catheter in 

place]


Ext: [no gross muscle atrophy], [no edema], [no contractures]


Neuro: [no focal neuro deficits]


Psych: Alert and oriented





Assessment and Plan





Acute on chronic hypoxic respiratory failure with hypercapnia due to laryngeal 

cancer, pleural effusion and COPD


Severe protein calorie malnutrition with cachexia with G-tube due to dysphagia 

BMI 12.4


Mediastinal lymphadenopathy


Anemia


Resolved: Hypokalemia





Chest CT shows increased pleural effusion, right middle lobe pneumonia versus 

atelectasis.  Pro-calcitonin negative, low concerns for pneumonia.  POD 3 

tracheostomy. POD 2 post bronchoscopy. Plans:  Continue full ventilator support 

managed by pulmonology, attempt to wean today.  Continue Zosyn for empiric 

treatment of pneumonia and aspiration while waiting on BAL cultures.  Continue 

Solu-Medrol 40 mg IV twice a day along with DuoNeb as needed for shortness of 

breath and wheezing for treatment of COPD.  Telemetry monitoring.  Head of bed 

elevation.  Pulmonology and general surgery on board.





BMI 12.6.  Plans: Continue G-tube feeds.  Consult dietitian.





As seen on previous CT chest.  Plans: Will likely need bronchoscopy with 

pulmonology after trach.  Follow pulmonology recommendations.





Hemoglobin 9.3-8.5.  Acute blood loss from surgery.  Macrocytic during 

admission. Iron studies shows iron deficiency.  B12 and folate within normal 

limits.  Plans: Hemoglobin stable. Transfuse if hemoglobin less than 7.  Daily 

CBC.





[Patient admitted for acute on chronic hypoxic respiratory failure due to mul

tiple reasons.  She is POD 3 tracheostomy.  POD 2 bronchoscopy, cultures 

pending.   following for LTAC placement.  Cleared for DC per 

pulmonology, pending insurance acceptance for LTAC.]





Continue Zosyn pending BAL cultures.  Continue IV Solu-Medrol to be weaned down 

in LTAC.





This complex discharge took 45 minutes to complete.


Pertinent Studies: 





Chest x-ray, chest CT


Procedures: 





Tracheostomy, bronchoscopy with BAL


Patient Condition at Discharge: Fair





Plan - Discharge Summary


Discharge Rx Participant: No


New Discharge Prescriptions: 


New


   Ipratropium-Albuterol Nebulize [Duoneb 0.5 mg-3 mg/3 ml Soln] 3 ml INHALATION

RT-QID  ampul.neb


   Enoxaparin [Lovenox] 40 mg SQ DAILY  syringe


   INSULIN ASPART (NovoLOG) [NovoLOG (formulary)] 0 unit SQ Q6HR  vial


   Albuterol Nebulized [Ventolin Nebulized] 2.5 mg INHALATION RT-QID PRN  nebu


     PRN Reason: Bronchospasm


   Piperacillin-Tazobactam [Zosyn] 3.375 gm IVPB Q8H  vial





No Action


   LORazepam [Ativan] 0.5 mg PO Q8H PRN


     PRN Reason: Anxiety


Discharge Medication List





LORazepam [Ativan] 0.5 mg PO Q8H PRN 09/26/19 [History]


Albuterol Nebulized [Ventolin Nebulized] 2.5 mg INHALATION RT-QID PRN  nebu 

10/04/19 [Rx]


Enoxaparin [Lovenox] 40 mg SQ DAILY  syringe 10/04/19 [Rx]


INSULIN ASPART (NovoLOG) [NovoLOG (formulary)] 0 unit SQ Q6HR  vial 10/04/19 

[Rx]


Ipratropium-Albuterol Nebulize [Duoneb 0.5 mg-3 mg/3 ml Soln] 3 ml INHALATION 

RT-QID  ampul.neb 10/04/19 [Rx]


Piperacillin-Tazobactam [Zosyn] 3.375 gm IVPB Q8H  vial 10/04/19 [Rx]








Follow up Appointment(s)/Referral(s): 


Houston Jerome MD [Primary Care Provider] - 1-2 days


Darnell Howell MD [STAFF PHYSICIAN] - 1 Week


Activity/Diet/Wound Care/Special Instructions: 


Diet: Tube feeds


Follow-up PCP within 3 days of discharge.


Follow-up pulmonology within 2 weeks of discharge.





Continue Zosyn and follow-up BAL cultures for adjustment of antibiotics.


Continue prednisone taper.





Discharge Disposition: [facility]

## 2019-10-04 NOTE — P.PN
Subjective


Progress Note Date: 10/04/19


Principal diagnosis: 





Acute on chronic hypoxic and hypercapnic respiratory failure








 This is a 54-year-old female patient of Dr. Jerome, who was recently 

hospitalized for acute respiratory failure secondary to aspiration pneumonia, 

hypoxemic and hypercapnic requiring intubation and placement on mechanical 

ventilation.  Patient was successfully extubated, was treated with antibiotics, 

completed a course of Levaquin and Zosyn.  Patient showed significant clinical 

and radiographic improvement, by the time of her discharge to the [facility].  Patient 

also has history of laryngeal cancer with previous chemoradiation, and had 

evidence of severe malnutrition.  In addition patient failed modified barium 

swallow, and was having evidence of aspiration with all consistencies.  Patient 

also had evidence of electrolyte abnormality with hyponatremia and hypokalemia, 

which improved with treatment.  Patient had a gastrostomy tube placed for 

nutritional support, and she was strict NPO.  Sputum cultures from previous 

admission were negative.  Other medical history includes chronic anemia of 

chronic disease, previous tobacco dependence.  Patient was discharged to the 

[facility] on 09/25/2019 however she started experiencing shortness 

of breath at the [facility], and was apparently hypoxic and was brought back for 

evaluation on oral 09/26/2019.  He denied any chest pain, she denies any oral 

intake at the nursing home home, she states she was getting her nutrition 

strictly through the gastrostomy tube.  No aspiration, no fever or chills, chest

x-ray showed basilar atelectasis and associated pleural effusions.  Lab work 

showed white blood cell count of 14.0, hemoglobin of 11, correlation profile was

within normal limits, blood gas showed pO2 of 64, pCO2 of 67, and pH of 7.32.  

Sodium was 140, potassium is 4.0, chloride is 98, CO2 37, B1 is 21, creatinine 

0.24.  O2 sat in the emergency department was ranging from 84-86 patient was 

placed on supplemental oxygen.  Patient was given IV steroids, nebulized 

bronchodilators, and worsening the patient in consultation for shortness of 

breath.  She is awake and alert, she is sitting up in bed, appears to be in no 

acute distress, currently on 2 L of oxygen, no significant wheezing, or 

congestion, she does have a weak cough, which does not sound congested, lung 

sounds are diminished, no rales auscultated.  Follow-up blood work was reviewed 

today, white blood cell count is 15.5, hemoglobin is 9.7, repeat blood gas 

showed pO2 of 60, pCO2 59, and pH of 7.39 this was done on FiO2 28%.  Patient 

looks extremely cachectic and weak, her voice is very weak and hoarse, but she 

appears to be in no acute distress.





On 09/28/2019 I'm seeing this patient for a follow-up.  This is an extremely 

debilitated and severely malnourished female patient who is post respiratory 

failure who was readmitted for ongoing difficulties in breathing.  Noted the 

patient's BMI is 11.9 and she has kwashiorkor.  The patient got moved yesterday 

to the intensive care unit because of worsening shortness of breath and altered 

mentation.  Note that on the floor the patient had a blood gas showing a pH of  

0.28 with a pCO2 of 83 and pO2 of 72.  This was on FiO2 of 32%.  She was having 

increased tachypnea and respiratory distress.  At that point the patient got 

transferred to the intensive care unit.  I was very hesitant intubated this 

patient based on an underlying condition and status.  Note that the patient is 

extremely frail and has absolutely no muscle mass and intubation may be quite 

devastating for this patient had the patient accordingly was placed on a BiPAP 

at a pressure of 10/5 cm of water.  She was also given some Ativan and morphine 

which.  Much calm that down.  Her subsequent blood gas showed a pH of 7.38 with 

a pCO2 of 69 a pO2 of 62.  I realized the patient's respiratory insufficiency he

had it performed a CAT scan of the chest yesterday and the CAT scan showed 

complete occlusion of the distal bronchus intermedius and right middle lobe 

atelectasis.  There was also atelectatic changes in lung bases bilaterally with 

a right middle lobe atelectasis right lower lobe atelectasis and bilateral 

pleural effusions.  Upper lobes are quite patent and well hydrated.  The patient

was started back on IV Zosyn.  She did become hypotensive briefly and she was 

given a liter of IV fluid bolus and currently she is on normal saline at rate of

100 mL an hour.  She is improved her blood pressure and she did not require any 

pressors.  She is currently awake.  She is following commands.  However, she has

a very poor insight on her condition.  She is not aware that she is quite 

debilitated in [place] major disadvantage for recovering from her chronic 

illness.  The patient also is receiving enteral feeding for nutritional support 

in the form of vitamin 1.2 which is currently at goal.  I added this patient IV 

Zosyn.  She is on Lovenox for DVT prophylaxis.  CAT scan of the chest also 

showed emphysema bilaterally.  She is on bronchodilators.  She is also on IV 

Solu-Medrol.  IV Protonix for GI prophylaxis.  





On 09/29/2019, the patient is being seen in follow-up she is awake and alert.  

She is following commands.  She is on a BiPAP at a pressure of 10/5 cm of water 

with an FiO2 of 40%.  The patient is very much compliant and tolerating her 

BiPAP.  No significant cough or sputum production.  The chest x-ray from today 

shows small bilateral pleural effusion, right middle lobe/right lower lobe a

telectasis.  Her pro-calcitonin level was low.  She is covered with IV Zosyn.  

She is also covered with IV Solu-Medrol and the dose was reduced down to 40 mg 

every 12 hours.  He is on DuoNeb nebulized treatments around the clock and she 

is receiving enteral feeding for nutritional support.  Sodium level is up to 

147.  Atelectatic discussion with the patient's family.  The family wants 

everything done including the possibility of long-term vent support.  In that 

case, it made recommendations to proceed with a tracheostomy and the patient is 

unable to breathe independently without any form of respiratory  assisting 

devices and the patient is quite debilitated and has significant malnutrition a

nd neuromuscular weakness.  





Patient was reevaluated today on 9/30/2019, patient remains on BiPAP, presently 

at a pressure of 10/5 and FiO2 of 40%.  She seems to be almost BiPAP dependent. 

Her chest x-ray is showing significant worsening of the right lower lobe and 

right lobe collapse and atelectasis, there is also some component of pleural 

effusion.  Remains on antibiotics in the form of Zosyn, patient is on 

bronchodilators is also on IV Solu-Medrol.  Not much of a change over the last 

24 hours.  Apparently the patient is to be seen by surgery today, and Dr. Liu recommended tracheostomy which will likely be done tomorrow or in the 

next couple of days.  Patient will be bronchoscoped after tracheostomy and 

mechanical ventilation.  And she read he has a PEG tube in place.  Presently the

patient is about the same, and not much change over the last 24 hours.  WBC 

count is 9.5 hemoglobin 9.6 electrolytes are normal bicarb is 37 renal 

functioning is normal.  Patient was already seen by general surgery, and surgery

is being considered for either tomorrow or Wednesday.





Patient was reevaluated today on 10/1/2019, remains on BiPAP this morning, 

scheduled to undergo tracheostomy this morning.  Patient again seems to be 

almost BiPAP dependent.  Chest x-ray continues to show evidence of right lower 

lobe and right lobe collapse and atelectasis.  Small tiny pleural effusion is 

noted.  Remains on antibiotics, bronchodilators, IV Solu-Medrol, and again the 

patient is scheduled for tracheostomy today.  Family is at bedside, and I 

discussed the option of bronchoscopy after tracheostomy which I will likely 

perform tomorrow on this patient to evaluate the right middle lobe and right l

ower lobe.





Patient was reevaluated today on 10/2/2019, she underwent tracheostomy 

yesterday, presently the patient is on mechanical ventilation, she is on before 

a c  mode of mechanical ventilation , 12 tidal volume of 350 FiO2 is down to 

50%, and PEEP is 5.  Patient seems to be very calm, comfortable, very 

appropriate, and her chest x-ray continues to show significant right lower lobe 

and right middle lobe collapse.  Hence considering the patient is now on 

mechanical ventilation, I have discussed with the patient the option of 

bronchoscopy and evaluation of the right middle lobe and right lower lobe, and 

she is agreeable to proceed with the procedure today.  The bronchoscopy team was

notified, and it will be done sometime this afternoon.  In the meantime patient 

remains nothing by mouth, her PEG tube feeding is on hold, and will empty her 

stomach. ABG this morning showed a pO2 of 53 pCO2 of 38 pH of 7.54.  

Electrolytes are normal renal profile is normal potassium is a bit low at 3.3 

WBC count is 10.3 hemoglobin is 9.3.





Patient was reevaluated today on 10/3/2019, she is postoperative day #1, patient

underwent tracheostomy placement for chronic respiratory failure and she is 

presently on assist control rate of 12, FiO2 of 35%, tidal volume of 350.  PEEP 

is 5.  Patient underwent bronchoscopy yesterday, and purulent secretions were 

suctioned out of the right middle lobe and right lower lobe.  Microbiology on 

the BAL is pending.  Patient seems to be very comfortable on mechanical 

ventilation.  Not requiring any propofol.  Hence I recommended a trial of 

pressure support of 8 and CPAP to be given today.  And depending on how she 

tolerates the weaning trial, may or may not proceed to trach collar.  If the 

patient fails weaning may proceed with transferring the patient to a select care

specialty for further weaning down the line.  Chest x-ray showed slight 

improvement in the aeration of the right lower lobe continues to have a small 

right pleural effusion.  CBC is relatively normal hemoglobin is 8.5 electrolytes

are normal renal profile is normal.





Patient was reevaluated today on 10/4/2019, remains in the ICU, remains on 

mechanical ventilation, status post tracheostomy and she is postoperative day 

#2.  Her ventilator mode remains the same, ventilator settings are the same.  As

noted above.  Patient was given a trial of pressure support and CPAP mode of 

weaning trial, but she did not do well after 1 hour she had to be placed back on

assist control mode of mechanical ventilation.  Patient seems to be quite 

comfortable on mechanical ventilation, and I'm going to try again another trial 

of pressure support and CPAP mode of weaning.  However I have a strong feeling 

that the patient will not be eased to wean, and she will eventually require 

transfer to select care specialty.  Chest x-ray continues to show chronic 

atelectasis of the right lower lobe although yesterday that was slightly 

improved aeration.  CBC showed WBC count of 6.5 hemoglobin 8.5 electrodes are 

normal renal profile is normal.





Objective





- Vital Signs


Vital signs: 


                                   Vital Signs











Temp  98.5 F   10/04/19 08:00


 


Pulse  87   10/04/19 10:54


 


Resp  57 H  10/04/19 10:00


 


BP  99/77   10/04/19 10:00


 


Pulse Ox  96   10/04/19 10:00








                                 Intake & Output











 10/03/19 10/04/19 10/04/19





 18:59 06:59 18:59


 


Intake Total 1280 400 340


 


Output Total 860 955 260


 


Balance 420 -555 80


 


Weight  36.5 kg 36.5 kg


 


Intake:   


 


   210 140


 


    .9 130 110 40


 


    Piperacillin-Tazobactam 3 100 100 100





    .375 gm In Sodium   





    Chloride 0.9% 100 ml @ 25   





    mls/hr IVPB Q8H [facility] Rx#:   





    [id number]   


 


    Potassium Chloride 20 meq 100  





    In Water For Injection 1   





    100ml.bag @ 50 mls/hr   





    IVPB Q2H [facility] Rx#:   





    [id number]   


 


    Potassium Chloride 20 meq 100  





    In Water For Injection 1   





    100ml.bag @ 50 mls/hr   





    IVPB Q2H [facility] Rx#:   





    [id number]   


 


  Tube Feeding 760 160 200


 


  Other 90 30 


 


Output:   


 


  Urine 860 955 260


 


Other:   


 


  Voiding Method Indwelling Catheter Indwelling Catheter 














- Exam





Physical Exam: 54-year-old on mechanical ventilation, asymptomatic, in no 

distress


Head: Atraumatic, normocephalic.  Tracheostomy tube is intact.


HEENT:[Neck is supple.] [No neck masses.] [No thyromegaly.] [No JVD.]  PERRLA, 

EOMI, no icterus.


Chest: Diminished breath sounds at the right base, no crackles or rhonchi or 

wheezes.


Cardiac Exam: [Normal S1 and S2, no S3 gallop, no murmur.]


Abdomen: [Soft, nontender,  no megaly, no rebound, no guarding, normal bowel 

sounds.]


Extremities: [No clubbing, no edema, no cyanosis.]


Neurological Exam: [No focal neurologic deficit.]  Alert and oriented 3.


Psychiatric: Normal mood affect and normal mental status examination.


Skin: No rashes.


Spine: No scoliosis.


Musculoskeletal: Muscle wasting is noted otherwise unremarkable.





- Labs


CBC & Chem 7: 


                                 10/04/19 08:32





                                 10/04/19 08:32


Labs: 


                  Abnormal Lab Results - Last 24 Hours (Table)











  10/03/19 10/03/19 10/03/19 Range/Units





  11:51 17:56 20:05 


 


RBC     (3.80-5.40)  m/uL


 


Hgb     (11.4-16.0)  gm/dL


 


Hct     (34.0-46.0)  %


 


MCV     (80.0-100.0)  fL


 


MCHC     (31.0-37.0)  g/dL


 


Lymphocytes #     (1.0-4.8)  k/uL


 


Potassium    3.4 L  (3.5-5.1)  mmol/L


 


Carbon Dioxide     (22-30)  mmol/L


 


Creatinine     (0.52-1.04)  mg/dL


 


Glucose     (74-99)  mg/dL


 


POC Glucose (mg/dL)  132 H  116 H   (75-99)  mg/dL


 


Calcium     (8.4-10.2)  mg/dL














  10/03/19 10/04/19 10/04/19 Range/Units





  23:55 06:12 08:32 


 


RBC    2.65 L  (3.80-5.40)  m/uL


 


Hgb    8.5 L  (11.4-16.0)  gm/dL


 


Hct    27.5 L  (34.0-46.0)  %


 


MCV    103.5 H  (80.0-100.0)  fL


 


MCHC    30.9 L  (31.0-37.0)  g/dL


 


Lymphocytes #    0.2 L  (1.0-4.8)  k/uL


 


Potassium     (3.5-5.1)  mmol/L


 


Carbon Dioxide     (22-30)  mmol/L


 


Creatinine     (0.52-1.04)  mg/dL


 


Glucose     (74-99)  mg/dL


 


POC Glucose (mg/dL)  128 H  107 H   (75-99)  mg/dL


 


Calcium     (8.4-10.2)  mg/dL














  10/04/19 Range/Units





  08:32 


 


RBC   (3.80-5.40)  m/uL


 


Hgb   (11.4-16.0)  gm/dL


 


Hct   (34.0-46.0)  %


 


MCV   (80.0-100.0)  fL


 


MCHC   (31.0-37.0)  g/dL


 


Lymphocytes #   (1.0-4.8)  k/uL


 


Potassium   (3.5-5.1)  mmol/L


 


Carbon Dioxide  32 H  (22-30)  mmol/L


 


Creatinine  0.24 L  (0.52-1.04)  mg/dL


 


Glucose  105 H  (74-99)  mg/dL


 


POC Glucose (mg/dL)   (75-99)  mg/dL


 


Calcium  8.2 L  (8.4-10.2)  mg/dL








                      Microbiology - Last 24 Hours (Table)











 10/02/19 15:00 Gram Stain - Final





 Bronchoalviolar Lavage - Right Bronchial Washings Culture - Final





    Candida albicans


 


 10/02/19 15:00 Acid Fast Bacilli Smear - Final





 Bronchoalviolar Lavage - Right Acid Fast Bacilli Culture - Preliminary














Assessment and Plan


Assessment: 





Impression:





1 acute on chronic hypoxic respiratory failure, status post tracheostomy, 

postoperative day #2, remains mechanically ventilated.  Failed pressure support 

and CPAP yesterday, however will try again today.





2 severe underlying COPD





3 history of laryngeal cancer and previous chemoradiation therapy





4 severe protein calorie malnutrition BMI of 11.9





5 chronic smoking





6 chronic anemia of chronic disease





7 right lower lobe and right middle lobe collapse, status post bronchoscopy 

cultures remain nondiagnostic 





8 chronic hypoxic and hypercapnic respiratory failure, presently on mechanical 

ventilation. 





 Recommendation:


Continue  tracheostomy care.


Continue mechanical ventilation, continue trials of weaning with pressure 

support and CPAP, eventually planned trach collar if possible.


Continue enteral feeding via PEG tube which is already in place.


Continue GI and DVT prophylaxis.  


Continue bronchodilators.  


Continue antibiotics,


Trial of weaning with pressure support and CPAP, and again today


Proceed with plans to transfer the patient to select care specialty.






































Time with Patient: Less than 30

## 2019-10-04 NOTE — XR
EXAMINATION TYPE: XR chest 1V portable

 

DATE OF EXAM: 10/4/2019

 

COMPARISON: 10/3/2019

 

HISTORY: Shortness of breath

 

TECHNIQUE: Single frontal view of the chest is obtained.

 

FINDINGS:  Bilateral consolidation and pleural effusion greater on the right is stable. Mediport cath
eter noted there is underlying COPD. Biapical pleural thickening noted throughout the right stable. L
eft-sided PICC line noted. Surgical staples in the midline with a catheter seen overlying the left up
per quadrant which may be related to gastrostomy tube. Tracheostomy tube seen. Surgical staples overl
[place] the upper abdomen. 

 

 

IMPRESSION:  

1. Bilateral infiltrate and pleural effusion and asymmetric right apical pleural thickening or mass. 
Stable.

## 2019-10-05 LAB
ANION GAP SERPL CALC-SCNC: 4 MMOL/L
BASOPHILS # BLD AUTO: 0 K/UL (ref 0–0.2)
BASOPHILS NFR BLD AUTO: 0 %
BUN SERPL-SCNC: 18 MG/DL (ref 7–17)
CALCIUM SPEC-MCNC: 8.3 MG/DL (ref 8.4–10.2)
CHLORIDE SERPL-SCNC: 99 MMOL/L (ref 98–107)
CO2 SERPL-SCNC: 33 MMOL/L (ref 22–30)
EOSINOPHIL # BLD AUTO: 0 K/UL (ref 0–0.7)
EOSINOPHIL NFR BLD AUTO: 0 %
ERYTHROCYTE [DISTWIDTH] IN BLOOD BY AUTOMATED COUNT: 2.71 M/UL (ref 3.8–5.4)
ERYTHROCYTE [DISTWIDTH] IN BLOOD: 14.9 % (ref 11.5–15.5)
GLUCOSE BLD-MCNC: 106 MG/DL (ref 75–99)
GLUCOSE BLD-MCNC: 114 MG/DL (ref 75–99)
GLUCOSE BLD-MCNC: 116 MG/DL (ref 75–99)
GLUCOSE BLD-MCNC: 124 MG/DL (ref 75–99)
GLUCOSE BLD-MCNC: 96 MG/DL (ref 75–99)
GLUCOSE SERPL-MCNC: 122 MG/DL (ref 74–99)
HCT VFR BLD AUTO: 28.1 % (ref 34–46)
HGB BLD-MCNC: 8.8 GM/DL (ref 11.4–16)
LYMPHOCYTES # SPEC AUTO: 0.2 K/UL (ref 1–4.8)
LYMPHOCYTES NFR SPEC AUTO: 4 %
MAGNESIUM SPEC-SCNC: 2 MG/DL (ref 1.6–2.3)
MCH RBC QN AUTO: 32.4 PG (ref 25–35)
MCHC RBC AUTO-ENTMCNC: 31.3 G/DL (ref 31–37)
MCV RBC AUTO: 103.6 FL (ref 80–100)
MONOCYTES # BLD AUTO: 0.2 K/UL (ref 0–1)
MONOCYTES NFR BLD AUTO: 3 %
NEUTROPHILS # BLD AUTO: 5.1 K/UL (ref 1.3–7.7)
NEUTROPHILS NFR BLD AUTO: 92 %
PLATELET # BLD AUTO: 268 K/UL (ref 150–450)
POTASSIUM SERPL-SCNC: 4.1 MMOL/L (ref 3.5–5.1)
SODIUM SERPL-SCNC: 136 MMOL/L (ref 137–145)
WBC # BLD AUTO: 5.6 K/UL (ref 3.8–10.6)

## 2019-10-05 RX ADMIN — IPRATROPIUM BROMIDE AND ALBUTEROL SULFATE SCH ML: .5; 3 SOLUTION RESPIRATORY (INHALATION) at 19:17

## 2019-10-05 RX ADMIN — MORPHINE SULFATE PRN MG: 4 INJECTION, SOLUTION INTRAMUSCULAR; INTRAVENOUS at 17:09

## 2019-10-05 RX ADMIN — PIPERACILLIN AND TAZOBACTAM SCH MLS/HR: 3; .375 INJECTION, POWDER, FOR SOLUTION INTRAVENOUS at 23:02

## 2019-10-05 RX ADMIN — ISODIUM CHLORIDE PRN MG: 0.03 SOLUTION RESPIRATORY (INHALATION) at 05:12

## 2019-10-05 RX ADMIN — ENOXAPARIN SODIUM SCH MG: 40 INJECTION SUBCUTANEOUS at 08:40

## 2019-10-05 RX ADMIN — PIPERACILLIN AND TAZOBACTAM SCH MLS/HR: 3; .375 INJECTION, POWDER, FOR SOLUTION INTRAVENOUS at 15:55

## 2019-10-05 RX ADMIN — ISODIUM CHLORIDE PRN MG: 0.03 SOLUTION RESPIRATORY (INHALATION) at 23:06

## 2019-10-05 RX ADMIN — METHYLPREDNISOLONE SODIUM SUCCINATE SCH MG: 40 INJECTION, POWDER, FOR SOLUTION INTRAMUSCULAR; INTRAVENOUS at 21:21

## 2019-10-05 RX ADMIN — IPRATROPIUM BROMIDE AND ALBUTEROL SULFATE SCH ML: .5; 3 SOLUTION RESPIRATORY (INHALATION) at 15:25

## 2019-10-05 RX ADMIN — MORPHINE SULFATE PRN MG: 4 INJECTION, SOLUTION INTRAMUSCULAR; INTRAVENOUS at 05:53

## 2019-10-05 RX ADMIN — INSULIN ASPART SCH: 100 INJECTION, SOLUTION INTRAVENOUS; SUBCUTANEOUS at 17:53

## 2019-10-05 RX ADMIN — METHYLPREDNISOLONE SODIUM SUCCINATE SCH MG: 40 INJECTION, POWDER, FOR SOLUTION INTRAMUSCULAR; INTRAVENOUS at 08:40

## 2019-10-05 RX ADMIN — INSULIN ASPART SCH: 100 INJECTION, SOLUTION INTRAVENOUS; SUBCUTANEOUS at 00:38

## 2019-10-05 RX ADMIN — INSULIN ASPART SCH: 100 INJECTION, SOLUTION INTRAVENOUS; SUBCUTANEOUS at 12:11

## 2019-10-05 RX ADMIN — MORPHINE SULFATE PRN MG: 4 INJECTION, SOLUTION INTRAMUSCULAR; INTRAVENOUS at 23:00

## 2019-10-05 RX ADMIN — PANTOPRAZOLE SODIUM SCH MG: 40 INJECTION, POWDER, FOR SOLUTION INTRAVENOUS at 08:40

## 2019-10-05 RX ADMIN — INSULIN ASPART SCH: 100 INJECTION, SOLUTION INTRAVENOUS; SUBCUTANEOUS at 06:03

## 2019-10-05 RX ADMIN — IPRATROPIUM BROMIDE AND ALBUTEROL SULFATE SCH ML: .5; 3 SOLUTION RESPIRATORY (INHALATION) at 10:50

## 2019-10-05 RX ADMIN — IPRATROPIUM BROMIDE AND ALBUTEROL SULFATE SCH ML: .5; 3 SOLUTION RESPIRATORY (INHALATION) at 07:22

## 2019-10-05 RX ADMIN — PIPERACILLIN AND TAZOBACTAM SCH MLS/HR: 3; .375 INJECTION, POWDER, FOR SOLUTION INTRAVENOUS at 06:03

## 2019-10-05 NOTE — P.PN
Subjective


Progress Note Date: 10/05/19


Principal diagnosis: 





Acute on chronic hypoxic and hypercapnic respiratory failure








 This is a 54-year-old female patient of Dr. Jerome, who was recently 

hospitalized for acute respiratory failure secondary to aspiration pneumonia, 

hypoxemic and hypercapnic requiring intubation and placement on mechanical 

ventilation.  Patient was successfully extubated, was treated with antibiotics, 

completed a course of Levaquin and Zosyn.  Patient showed significant clinical 

and radiographic improvement, by the time of her discharge to the [facility].  Patient 

also has history of laryngeal cancer with previous chemoradiation, and had 

evidence of severe malnutrition.  In addition patient failed modified barium 

swallow, and was having evidence of aspiration with all consistencies.  Patient 

also had evidence of electrolyte abnormality with hyponatremia and hypokalemia, 

which improved with treatment.  Patient had a gastrostomy tube placed for 

nutritional support, and she was strict NPO.  Sputum cultures from previous 

admission were negative.  Other medical history includes chronic anemia of 

chronic disease, previous tobacco dependence.  Patient was discharged to the 

[facility] on 09/25/2019 however she started experiencing shortness 

of breath at the [facility], and was apparently hypoxic and was brought back for 

evaluation on oral 09/26/2019.  He denied any chest pain, she denies any oral 

intake at the nursing home home, she states she was getting her nutrition 

strictly through the gastrostomy tube.  No aspiration, no fever or chills, chest

x-ray showed basilar atelectasis and associated pleural effusions.  Lab work 

showed white blood cell count of 14.0, hemoglobin of 11, correlation profile was

within normal limits, blood gas showed pO2 of 64, pCO2 of 67, and pH of 7.32.  

Sodium was 140, potassium is 4.0, chloride is 98, CO2 37, B1 is 21, creatinine 

0.24.  O2 sat in the emergency department was ranging from 84-86 patient was 

placed on supplemental oxygen.  Patient was given IV steroids, nebulized 

bronchodilators, and worsening the patient in consultation for shortness of 

breath.  She is awake and alert, she is sitting up in bed, appears to be in no 

acute distress, currently on 2 L of oxygen, no significant wheezing, or 

congestion, she does have a weak cough, which does not sound congested, lung 

sounds are diminished, no rales auscultated.  Follow-up blood work was reviewed 

today, white blood cell count is 15.5, hemoglobin is 9.7, repeat blood gas 

showed pO2 of 60, pCO2 59, and pH of 7.39 this was done on FiO2 28%.  Patient 

looks extremely cachectic and weak, her voice is very weak and hoarse, but she 

appears to be in no acute distress.





On 09/28/2019 I'm seeing this patient for a follow-up.  This is an extremely 

debilitated and severely malnourished female patient who is post respiratory 

failure who was readmitted for ongoing difficulties in breathing.  Noted the 

patient's BMI is 11.9 and she has kwashiorkor.  The patient got moved yesterday 

to the intensive care unit because of worsening shortness of breath and altered 

mentation.  Note that on the floor the patient had a blood gas showing a pH of  

0.28 with a pCO2 of 83 and pO2 of 72.  This was on FiO2 of 32%.  She was having 

increased tachypnea and respiratory distress.  At that point the patient got 

transferred to the intensive care unit.  I was very hesitant intubated this 

patient based on an underlying condition and status.  Note that the patient is 

extremely frail and has absolutely no muscle mass and intubation may be quite 

devastating for this patient had the patient accordingly was placed on a BiPAP 

at a pressure of 10/5 cm of water.  She was also given some Ativan and morphine 

which.  Much calm that down.  Her subsequent blood gas showed a pH of 7.38 with 

a pCO2 of 69 a pO2 of 62.  I realized the patient's respiratory insufficiency he

had it performed a CAT scan of the chest yesterday and the CAT scan showed 

complete occlusion of the distal bronchus intermedius and right middle lobe 

atelectasis.  There was also atelectatic changes in lung bases bilaterally with 

a right middle lobe atelectasis right lower lobe atelectasis and bilateral 

pleural effusions.  Upper lobes are quite patent and well hydrated.  The patient

was started back on IV Zosyn.  She did become hypotensive briefly and she was 

given a liter of IV fluid bolus and currently she is on normal saline at rate of

100 mL an hour.  She is improved her blood pressure and she did not require any 

pressors.  She is currently awake.  She is following commands.  However, she has

a very poor insight on her condition.  She is not aware that she is quite 

debilitated in [place] major disadvantage for recovering from her chronic 

illness.  The patient also is receiving enteral feeding for nutritional support 

in the form of vitamin 1.2 which is currently at goal.  I added this patient IV 

Zosyn.  She is on Lovenox for DVT prophylaxis.  CAT scan of the chest also 

showed emphysema bilaterally.  She is on bronchodilators.  She is also on IV 

Solu-Medrol.  IV Protonix for GI prophylaxis.  





On 09/29/2019, the patient is being seen in follow-up she is awake and alert.  

She is following commands.  She is on a BiPAP at a pressure of 10/5 cm of water 

with an FiO2 of 40%.  The patient is very much compliant and tolerating her 

BiPAP.  No significant cough or sputum production.  The chest x-ray from today 

shows small bilateral pleural effusion, right middle lobe/right lower lobe a

telectasis.  Her pro-calcitonin level was low.  She is covered with IV Zosyn.  

She is also covered with IV Solu-Medrol and the dose was reduced down to 40 mg 

every 12 hours.  He is on DuoNeb nebulized treatments around the clock and she 

is receiving enteral feeding for nutritional support.  Sodium level is up to 

147.  Atelectatic discussion with the patient's family.  The family wants 

everything done including the possibility of long-term vent support.  In that 

case, it made recommendations to proceed with a tracheostomy and the patient is 

unable to breathe independently without any form of respiratory  assisting 

devices and the patient is quite debilitated and has significant malnutrition a

nd neuromuscular weakness.  





Patient was reevaluated today on 9/30/2019, patient remains on BiPAP, presently 

at a pressure of 10/5 and FiO2 of 40%.  She seems to be almost BiPAP dependent. 

Her chest x-ray is showing significant worsening of the right lower lobe and 

right lobe collapse and atelectasis, there is also some component of pleural 

effusion.  Remains on antibiotics in the form of Zosyn, patient is on 

bronchodilators is also on IV Solu-Medrol.  Not much of a change over the last 

24 hours.  Apparently the patient is to be seen by surgery today, and Dr. Liu recommended tracheostomy which will likely be done tomorrow or in the 

next couple of days.  Patient will be bronchoscoped after tracheostomy and 

mechanical ventilation.  And she read he has a PEG tube in place.  Presently the

patient is about the same, and not much change over the last 24 hours.  WBC 

count is 9.5 hemoglobin 9.6 electrolytes are normal bicarb is 37 renal 

functioning is normal.  Patient was already seen by general surgery, and surgery

is being considered for either tomorrow or Wednesday.





Patient was reevaluated today on 10/1/2019, remains on BiPAP this morning, 

scheduled to undergo tracheostomy this morning.  Patient again seems to be 

almost BiPAP dependent.  Chest x-ray continues to show evidence of right lower 

lobe and right lobe collapse and atelectasis.  Small tiny pleural effusion is 

noted.  Remains on antibiotics, bronchodilators, IV Solu-Medrol, and again the 

patient is scheduled for tracheostomy today.  Family is at bedside, and I 

discussed the option of bronchoscopy after tracheostomy which I will likely 

perform tomorrow on this patient to evaluate the right middle lobe and right l

ower lobe.





Patient was reevaluated today on 10/2/2019, she underwent tracheostomy 

yesterday, presently the patient is on mechanical ventilation, she is on before 

a c  mode of mechanical ventilation , 12 tidal volume of 350 FiO2 is down to 

50%, and PEEP is 5.  Patient seems to be very calm, comfortable, very 

appropriate, and her chest x-ray continues to show significant right lower lobe 

and right middle lobe collapse.  Hence considering the patient is now on 

mechanical ventilation, I have discussed with the patient the option of 

bronchoscopy and evaluation of the right middle lobe and right lower lobe, and 

she is agreeable to proceed with the procedure today.  The bronchoscopy team was

notified, and it will be done sometime this afternoon.  In the meantime patient 

remains nothing by mouth, her PEG tube feeding is on hold, and will empty her 

stomach. ABG this morning showed a pO2 of 53 pCO2 of 38 pH of 7.54.  

Electrolytes are normal renal profile is normal potassium is a bit low at 3.3 

WBC count is 10.3 hemoglobin is 9.3.





Patient was reevaluated today on 10/3/2019, she is postoperative day #1, patient

underwent tracheostomy placement for chronic respiratory failure and she is 

presently on assist control rate of 12, FiO2 of 35%, tidal volume of 350.  PEEP 

is 5.  Patient underwent bronchoscopy yesterday, and purulent secretions were 

suctioned out of the right middle lobe and right lower lobe.  Microbiology on 

the BAL is pending.  Patient seems to be very comfortable on mechanical 

ventilation.  Not requiring any propofol.  Hence I recommended a trial of 

pressure support of 8 and CPAP to be given today.  And depending on how she 

tolerates the weaning trial, may or may not proceed to trach collar.  If the 

patient fails weaning may proceed with transferring the patient to a [facility] care

specialty for further weaning down the line.  Chest x-ray showed slight 

improvement in the aeration of the right lower lobe continues to have a small 

right pleural effusion.  CBC is relatively normal hemoglobin is 8.5 electrolytes

are normal renal profile is normal.





Patient was reevaluated today on 10/4/2019, remains in the ICU, remains on 

mechanical ventilation, status post tracheostomy and she is postoperative day 

#2.  Her ventilator mode remains the same, ventilator settings are the same.  As

noted above.  Patient was given a trial of pressure support and CPAP mode of 

weaning trial, but she did not do well after 1 hour she had to be placed back on

assist control mode of mechanical ventilation.  Patient seems to be quite 

comfortable on mechanical ventilation, and I'm going to try again another trial 

of pressure support and CPAP mode of weaning.  However I have a strong feeling 

that the patient will not be eased to wean, and she will eventually require 

transfer to [facility] further we[facility]..  Chest x-ray continues to show 

chronic atelectasis of the right lower lobe although yesterday that was slightly

improved aeration.  CBC showed WBC count of 6.5 hemoglobin 8.5 electrodes are 

normal renal profile is normal.





Reevaluated today on 10/5/2019, patient remains in the ICU, on mechanical 

ventilation, she is status post tracheostomy and she is postoperative day #3.  

Weaning has failed with pressure support and CPAP, hence we never got to the 

point where we could place the patient on trach collar.  Patient has been 

accepted in [facility], and she would have further weaning in that 

facility.  Patient is hemodynamically stable, she remains on steroids which had 

to be tapered off antibiotics.  And overall the only issue remains weaning down 

the line.  This will be difficult but could be done over time. Labs today were 

reviewed.  No chest x-ray was done today.





Objective





- Vital Signs


Vital signs: 


                                   Vital Signs











Temp  97.8 F   10/05/19 08:00


 


Pulse  85   10/05/19 11:03


 


Resp  22   10/05/19 09:00


 


BP  95/68   10/05/19 09:00


 


Pulse Ox  99   10/05/19 09:00








                                 Intake & Output











 10/04/19 10/05/19 10/05/19





 18:59 06:59 18:59


 


Intake Total 1160 775 275


 


Output Total 745 760 95


 


Balance 415 15 180


 


Weight 36.5 kg  


 


Intake:   


 


   245 95


 


    .9 120 120 20


 


    Piperacillin-Tazobactam 3 200 125 75





    .375 gm In Sodium   





    Chloride 0.9% 100 ml @ 25   





    mls/hr IVPB Q8H C[facility]O Rx#:   





    [id number]   


 


  Intake, IV Titration 200  





  Amount   


 


    Magnesium Sulfate-D5w Pmx 200  





    1 gm In Dextrose/Water 1   





    100ml.bag @ 100 mls/hr   





    IVPB Q1H [facility] Rx#:   





    [id number]   


 


  Tube Feeding 640 440 120


 


  Other  90 60


 


Output:   


 


  Urine 745 760 95


 


Other:   


 


  Voiding Method Indwelling Catheter Indwelling Catheter Indwelling Catheter














- Exam





Physical Exam: 54-year-old on mechanical ventilation, asymptomatic, in no 

distress


Head: Atraumatic, normocephalic.  Tracheostomy tube is intact.


HEENT:[Neck is supple.] [No neck masses.] [No thyromegaly.] [No JVD.]  PERRLA, 

EOMI, no icterus.


Chest: Diminished breath sounds at the right base, no crackles or rhonchi or 

wheezes.


Cardiac Exam: [Normal S1 and S2, no S3 gallop, no murmur.]


Abdomen: [Soft, nontender,  no megaly, no rebound, no guarding, normal bowel 

sounds.]


Extremities: [No clubbing, no edema, no cyanosis.]


Neurological Exam: [No focal neurologic deficit.]  Alert and oriented 3.


Psychiatric: Normal mood affect and normal mental status examination.


Skin: No rashes.








- Labs


CBC & Chem 7: 


                                 10/05/19 04:54





                                 10/05/19 04:54


Labs: 


                  Abnormal Lab Results - Last 24 Hours (Table)











  10/04/19 10/04/19 10/05/19 Range/Units





  11:57 12:56 04:54 


 


RBC    2.71 L  (3.80-5.40)  m/uL


 


Hgb    8.8 L  (11.4-16.0)  gm/dL


 


Hct    28.1 L  (34.0-46.0)  %


 


MCV    103.6 H  (80.0-100.0)  fL


 


Lymphocytes #    0.2 L  (1.0-4.8)  k/uL


 


Sodium     (137-145)  mmol/L


 


Carbon Dioxide     (22-30)  mmol/L


 


BUN     (7-17)  mg/dL


 


Creatinine     (0.52-1.04)  mg/dL


 


Glucose     (74-99)  mg/dL


 


POC Glucose (mg/dL)  139 H  144 H   (75-99)  mg/dL


 


Calcium     (8.4-10.2)  mg/dL














  10/05/19 10/05/19 10/05/19 Range/Units





  04:54 05:57 11:44 


 


RBC     (3.80-5.40)  m/uL


 


Hgb     (11.4-16.0)  gm/dL


 


Hct     (34.0-46.0)  %


 


MCV     (80.0-100.0)  fL


 


Lymphocytes #     (1.0-4.8)  k/uL


 


Sodium  136 L    (137-145)  mmol/L


 


Carbon Dioxide  33 H    (22-30)  mmol/L


 


BUN  18 H    (7-17)  mg/dL


 


Creatinine  0.30 L    (0.52-1.04)  mg/dL


 


Glucose  122 H    (74-99)  mg/dL


 


POC Glucose (mg/dL)   124 H  116 H  (75-99)  mg/dL


 


Calcium  8.3 L    (8.4-10.2)  mg/dL








                      Microbiology - Last 24 Hours (Table)











 10/02/19 15:00 Gram Stain - Final





 Bronchoalviolar Lavage - Right Bronchial Washings Culture - Final





    Candida albicans














Assessment and Plan


Assessment: 





Impression:





1 acute on chronic hypoxic respiratory failure, status post tracheostomy, 

postoperative day #3, remains mechanically ventilated.  Failed pressure support 

and CPAP yesterday, discharge planning to [facility] is in progress.





2 severe underlying COPD





3 history of laryngeal cancer and previous chemoradiation therapy





4 severe protein calorie malnutrition BMI of 11.9





5 chronic smoking





6 chronic anemia of chronic disease





7 right lower lobe and right middle lobe collapse, status post bronchoscopy 

cultures remain nondiagnostic 





8 chronic hypoxic and hypercapnic respiratory failure, presently on mechanical 

ventilation. 





 Recommendation:


Continue  tracheostomy care.


Continue mechanical ventilation, continue trials of weaning with pressure 

support and CPAP, 


Continue enteral feeding via PEG tube which is already in place.


Continue GI and DVT prophylaxis.  


Continue bronchodilators.  


Discontinue antibiotics.  Continue prednisone tapering.


Agree with transfer plans to [facility] care.



































Time with Patient: Less than 30

## 2019-10-05 NOTE — P.PN
Subjective


Progress Note Date: 10/05/19


Principal diagnosis: 





Acute on chronic hypoxic respiratory failure





Patient was seen and examined.  No acute events overnight.  Postop day 4 

tracheostomy. Underwent bronchoscopy POD 3, purulent secretions evacuated with 

microbiology pending. Currently back on mechanical ventilation. 





Objective





- Vital Signs


Vital signs: 


                                   Vital Signs











Temp  97 F L  10/05/19 12:00


 


Pulse  87   10/05/19 12:00


 


Resp  28 H  10/05/19 12:00


 


BP  94/68   10/05/19 12:00


 


Pulse Ox  96   10/05/19 12:00








                                 Intake & Output











 10/04/19 10/05/19 10/05/19





 18:59 06:59 18:59


 


Intake Total 1160 775 275


 


Output Total 745 760 95


 


Balance 415 15 180


 


Weight 36.5 kg  


 


Intake:   


 


   245 95


 


    .9 120 120 20


 


    Piperacillin-Tazobactam 3 200 125 75





    .375 gm In Sodium   





    Chloride 0.9% 100 ml @ 25   





    mls/hr IVPB Q8H [facility] Rx#:   





    [id number]   


 


  Intake, IV Titration 200  





  Amount   


 


    Magnesium Sulfate-D5w Pmx 200  





    1 gm In Dextrose/Water 1   





    100ml.bag @ 100 mls/hr   





    IVPB Q1H [facility] Rx#:   





    [id number]   


 


  Tube Feeding 640 440 120


 


  Other  90 60


 


Output:   


 


  Urine 745 760 95


 


Other:   


 


  Voiding Method Indwelling Catheter Indwelling Catheter Indwelling Catheter


 


  # Voids   1














- Exam





General: [Cachectic], [on CPAP, tracheostomy site clean and dry], [appears at 

stated age]


Derm: [warm], [dry]


Head: [atraumatic], [normocephalic], [bitemporal wasting]


Eyes:  [no lid lag], [anicteric sclera]


Mouth: [no lip lesion], [mucus membranes moist]


Cardiovascular: [S1S2 reg], [no murmur], [positive DP pulse bilateral], 


Lungs: [Decreased breath sounds bilateral], [no rhonchi, no rales] , [no 

accessory muscle use]


Abdominal: [soft], [ nontender to palpation], [no guarding], [Anderson catheter in 

place]


Ext: [no gross muscle atrophy], [no edema], [no contractures]


Neuro: [no focal neuro deficits]


Psych: Alert and oriented





- Labs


CBC & Chem 7: 


                                 10/05/19 04:54





                                 10/05/19 04:54


Labs: 


                  Abnormal Lab Results - Last 24 Hours (Table)











  10/05/19 10/05/19 10/05/19 Range/Units





  04:54 04:54 05:57 


 


RBC  2.71 L    (3.80-5.40)  m/uL


 


Hgb  8.8 L    (11.4-16.0)  gm/dL


 


Hct  28.1 L    (34.0-46.0)  %


 


MCV  103.6 H    (80.0-100.0)  fL


 


Lymphocytes #  0.2 L    (1.0-4.8)  k/uL


 


Sodium   136 L   (137-145)  mmol/L


 


Carbon Dioxide   33 H   (22-30)  mmol/L


 


BUN   18 H   (7-17)  mg/dL


 


Creatinine   0.30 L   (0.52-1.04)  mg/dL


 


Glucose   122 H   (74-99)  mg/dL


 


POC Glucose (mg/dL)    124 H  (75-99)  mg/dL


 


Calcium   8.3 L   (8.4-10.2)  mg/dL














  10/05/19 Range/Units





  11:44 


 


RBC   (3.80-5.40)  m/uL


 


Hgb   (11.4-16.0)  gm/dL


 


Hct   (34.0-46.0)  %


 


MCV   (80.0-100.0)  fL


 


Lymphocytes #   (1.0-4.8)  k/uL


 


Sodium   (137-145)  mmol/L


 


Carbon Dioxide   (22-30)  mmol/L


 


BUN   (7-17)  mg/dL


 


Creatinine   (0.52-1.04)  mg/dL


 


Glucose   (74-99)  mg/dL


 


POC Glucose (mg/dL)  116 H  (75-99)  mg/dL


 


Calcium   (8.4-10.2)  mg/dL














Assessment and Plan


Assessment: 





Assessment and Plan





Acute on chronic hypoxic respiratory failure with hypercapnia due to laryngeal 

cancer, pleural effusion and COPD


Severe protein calorie malnutrition with cachexia with G-tube due to dysphagia 

BMI 12.4


Mediastinal lymphadenopathy


Anemia


Resolved: Hypokalemia





Chest CT shows increased pleural effusion, right middle lobe pneumonia versus 

atelectasis.  Pro-calcitonin negative, low concerns for pneumonia.  POD 4 

tracheostomy. POD 3 post bronchoscopy. Plans:  Continue full ventilator support 

managed by pulmonology, attempt to wean today.  Discussed with Dr. Lynda 

yesterday, discontinue antibiotics as cultures have been negative.  Continue 

Solu-Medrol 40 mg IV twice a day along with DuoNeb as needed for shortness of 

breath and wheezing for treatment of COPD. Ee will discontinue Solu-Medrol and 

start the patient on a prednisone taper on discharge.  Telemetry monitoring.  

Head of bed elevation.  Pulmonology and general surgery on board.





BMI 12.6.  Plans: Continue G-tube feeds.  Consult dietitian.





As seen on previous CT chest.  Plans: Will likely need bronchoscopy with 

pulmonology after trach.  Follow pulmonology recommendations.





Hemoglobin 9.3-8.5-8.5.  Acute blood loss from surgery.  Macrocytic during 

admission. Iron studies shows iron deficiency.  B12 and folate within normal 

limits.  Plans: Hemoglobin stable. Transfuse if hemoglobin less than 7.  Daily 

CBC.





[Patient admitted for acute on chronic hypoxic respiratory failure due to 

multiple reasons.  She is POD 4 tracheostomy.  POD 3 bronchoscopy, cultures pend

ing.   following for LTAC placement.  Cleared for DC per 

pulmonology, pending insurance acceptance for LTAC.]

## 2019-10-06 LAB
ANION GAP SERPL CALC-SCNC: 4 MMOL/L
BASOPHILS # BLD AUTO: 0 K/UL (ref 0–0.2)
BASOPHILS NFR BLD AUTO: 0 %
BUN SERPL-SCNC: 17 MG/DL (ref 7–17)
CALCIUM SPEC-MCNC: 8.3 MG/DL (ref 8.4–10.2)
CHLORIDE SERPL-SCNC: 99 MMOL/L (ref 98–107)
CO2 SERPL-SCNC: 32 MMOL/L (ref 22–30)
EOSINOPHIL # BLD AUTO: 0 K/UL (ref 0–0.7)
EOSINOPHIL NFR BLD AUTO: 0 %
ERYTHROCYTE [DISTWIDTH] IN BLOOD BY AUTOMATED COUNT: 2.72 M/UL (ref 3.8–5.4)
ERYTHROCYTE [DISTWIDTH] IN BLOOD: 15.2 % (ref 11.5–15.5)
GLUCOSE BLD-MCNC: 122 MG/DL (ref 75–99)
GLUCOSE BLD-MCNC: 91 MG/DL (ref 75–99)
GLUCOSE BLD-MCNC: 96 MG/DL (ref 75–99)
GLUCOSE SERPL-MCNC: 102 MG/DL (ref 74–99)
HCT VFR BLD AUTO: 28.2 % (ref 34–46)
HGB BLD-MCNC: 8.7 GM/DL (ref 11.4–16)
LYMPHOCYTES # SPEC AUTO: 0.2 K/UL (ref 1–4.8)
LYMPHOCYTES NFR SPEC AUTO: 3 %
MCH RBC QN AUTO: 32.1 PG (ref 25–35)
MCHC RBC AUTO-ENTMCNC: 30.9 G/DL (ref 31–37)
MCV RBC AUTO: 103.9 FL (ref 80–100)
MONOCYTES # BLD AUTO: 0.2 K/UL (ref 0–1)
MONOCYTES NFR BLD AUTO: 4 %
NEUTROPHILS # BLD AUTO: 5 K/UL (ref 1.3–7.7)
NEUTROPHILS NFR BLD AUTO: 92 %
PLATELET # BLD AUTO: 252 K/UL (ref 150–450)
POTASSIUM SERPL-SCNC: 3.6 MMOL/L (ref 3.5–5.1)
SODIUM SERPL-SCNC: 135 MMOL/L (ref 137–145)
WBC # BLD AUTO: 5.5 K/UL (ref 3.8–10.6)

## 2019-10-06 RX ADMIN — PIPERACILLIN AND TAZOBACTAM SCH MLS/HR: 3; .375 INJECTION, POWDER, FOR SOLUTION INTRAVENOUS at 22:46

## 2019-10-06 RX ADMIN — MORPHINE SULFATE PRN MG: 4 INJECTION, SOLUTION INTRAMUSCULAR; INTRAVENOUS at 15:39

## 2019-10-06 RX ADMIN — MORPHINE SULFATE PRN MG: 4 INJECTION, SOLUTION INTRAMUSCULAR; INTRAVENOUS at 12:08

## 2019-10-06 RX ADMIN — IPRATROPIUM BROMIDE AND ALBUTEROL SULFATE SCH ML: .5; 3 SOLUTION RESPIRATORY (INHALATION) at 15:17

## 2019-10-06 RX ADMIN — INSULIN ASPART SCH: 100 INJECTION, SOLUTION INTRAVENOUS; SUBCUTANEOUS at 11:48

## 2019-10-06 RX ADMIN — PIPERACILLIN AND TAZOBACTAM SCH MLS/HR: 3; .375 INJECTION, POWDER, FOR SOLUTION INTRAVENOUS at 07:07

## 2019-10-06 RX ADMIN — MORPHINE SULFATE PRN MG: 4 INJECTION, SOLUTION INTRAMUSCULAR; INTRAVENOUS at 20:26

## 2019-10-06 RX ADMIN — IPRATROPIUM BROMIDE AND ALBUTEROL SULFATE SCH ML: .5; 3 SOLUTION RESPIRATORY (INHALATION) at 07:13

## 2019-10-06 RX ADMIN — POTASSIUM BICARBONATE SCH MEQ: 782 TABLET, EFFERVESCENT ORAL at 07:07

## 2019-10-06 RX ADMIN — ISODIUM CHLORIDE PRN MG: 0.03 SOLUTION RESPIRATORY (INHALATION) at 23:32

## 2019-10-06 RX ADMIN — METHYLPREDNISOLONE SODIUM SUCCINATE SCH MG: 40 INJECTION, POWDER, FOR SOLUTION INTRAMUSCULAR; INTRAVENOUS at 08:20

## 2019-10-06 RX ADMIN — MORPHINE SULFATE PRN MG: 4 INJECTION, SOLUTION INTRAMUSCULAR; INTRAVENOUS at 07:08

## 2019-10-06 RX ADMIN — INSULIN ASPART SCH: 100 INJECTION, SOLUTION INTRAVENOUS; SUBCUTANEOUS at 00:56

## 2019-10-06 RX ADMIN — INSULIN ASPART SCH: 100 INJECTION, SOLUTION INTRAVENOUS; SUBCUTANEOUS at 19:40

## 2019-10-06 RX ADMIN — POTASSIUM BICARBONATE SCH MEQ: 782 TABLET, EFFERVESCENT ORAL at 07:08

## 2019-10-06 RX ADMIN — INSULIN ASPART SCH: 100 INJECTION, SOLUTION INTRAVENOUS; SUBCUTANEOUS at 06:50

## 2019-10-06 RX ADMIN — PIPERACILLIN AND TAZOBACTAM SCH MLS/HR: 3; .375 INJECTION, POWDER, FOR SOLUTION INTRAVENOUS at 15:38

## 2019-10-06 RX ADMIN — ENOXAPARIN SODIUM SCH MG: 40 INJECTION SUBCUTANEOUS at 08:20

## 2019-10-06 RX ADMIN — METHYLPREDNISOLONE SODIUM SUCCINATE SCH MG: 40 INJECTION, POWDER, FOR SOLUTION INTRAMUSCULAR; INTRAVENOUS at 20:26

## 2019-10-06 RX ADMIN — IPRATROPIUM BROMIDE AND ALBUTEROL SULFATE SCH ML: .5; 3 SOLUTION RESPIRATORY (INHALATION) at 19:19

## 2019-10-06 RX ADMIN — IPRATROPIUM BROMIDE AND ALBUTEROL SULFATE SCH ML: .5; 3 SOLUTION RESPIRATORY (INHALATION) at 11:32

## 2019-10-06 RX ADMIN — PANTOPRAZOLE SODIUM SCH MG: 40 INJECTION, POWDER, FOR SOLUTION INTRAVENOUS at 08:20

## 2019-10-06 NOTE — XR
EXAMINATION TYPE: XR chest 1V portable

 

DATE OF EXAM: 10/6/2019

 

HISTORY: SOB.

 

REFERENCE: Previous study dated 10/4/2019.

 

FINDINGS: A MediPort is in place via a right internal jugular approach. Its tip is in the right atriu
m. A tracheostomy tube is in place and its tip overlies the tracheal air column in this single fronta
l projection.

 

The lungs are overinflated. The heart is enlarged. Heart contours unusual and appears to be shifted t
owards the right. I could not exclude some degree of dextrocardia.

 

There is increasing right hilar density. There is a paucity of lung markings beyond this. This likely
 reflects the patient's COPD rule out pneumothorax. Please correlate clinically. The left lung is malika
ar. There is a right-sided effusion.

 

IMPRESSION: 

1. COPD.

2. CARDIOMEGALY.

3. RIGHT BASILAR AIRSPACE DISEASE WITH A CONCOMITANT EFFUSION.

## 2019-10-06 NOTE — P.PN
Subjective


Progress Note Date: 10/06/19


Principal diagnosis: 





Acute on chronic hypoxic and hypercapnic respiratory failure








 This is a 54-year-old female patient of Dr. Jerome, who was recently 

hospitalized for acute respiratory failure secondary to aspiration pneumonia, 

hypoxemic and hypercapnic requiring intubation and placement on mechanical 

ventilation.  Patient was successfully extubated, was treated with antibiotics, 

completed a course of Levaquin and Zosyn.  Patient showed significant clinical 

and radiographic improvement, by the time of her discharge to the [facility].  Patient 

also has history of laryngeal cancer with previous chemoradiation, and had 

evidence of severe malnutrition.  In addition patient failed modified barium 

swallow, and was having evidence of aspiration with all consistencies.  Patient 

also had evidence of electrolyte abnormality with hyponatremia and hypokalemia, 

which improved with treatment.  Patient had a gastrostomy tube placed for 

nutritional support, and she was strict NPO.  Sputum cultures from previous 

admission were negative.  Other medical history includes chronic anemia of 

chronic disease, previous tobacco dependence.  Patient was discharged to the 

[facility] on 09/25/2019 however she started experiencing shortness 

of breath at the [facility], and was apparently hypoxic and was brought back for 

evaluation on oral 09/26/2019.  He denied any chest pain, she denies any oral 

intake at the nursing home home, she states she was getting her nutrition 

strictly through the gastrostomy tube.  No aspiration, no fever or chills, chest

x-ray showed basilar atelectasis and associated pleural effusions.  Lab work 

showed white blood cell count of 14.0, hemoglobin of 11, correlation profile was

within normal limits, blood gas showed pO2 of 64, pCO2 of 67, and pH of 7.32.  

Sodium was 140, potassium is 4.0, chloride is 98, CO2 37, B1 is 21, creatinine 

0.24.  O2 sat in the emergency department was ranging from 84-86 patient was 

placed on supplemental oxygen.  Patient was given IV steroids, nebulized 

bronchodilators, and worsening the patient in consultation for shortness of 

breath.  She is awake and alert, she is sitting up in bed, appears to be in no 

acute distress, currently on 2 L of oxygen, no significant wheezing, or 

congestion, she does have a weak cough, which does not sound congested, lung 

sounds are diminished, no rales auscultated.  Follow-up blood work was reviewed 

today, white blood cell count is 15.5, hemoglobin is 9.7, repeat blood gas 

showed pO2 of 60, pCO2 59, and pH of 7.39 this was done on FiO2 28%.  Patient 

looks extremely cachectic and weak, her voice is very weak and hoarse, but she 

appears to be in no acute distress.





On 09/28/2019 I'm seeing this patient for a follow-up.  This is an extremely 

debilitated and severely malnourished female patient who is post respiratory 

failure who was readmitted for ongoing difficulties in breathing.  Noted the 

patient's BMI is 11.9 and she has kwashiorkor.  The patient got moved yesterday 

to the intensive care unit because of worsening shortness of breath and altered 

mentation.  Note that on the floor the patient had a blood gas showing a pH of  

0.28 with a pCO2 of 83 and pO2 of 72.  This was on FiO2 of 32%.  She was having 

increased tachypnea and respiratory distress.  At that point the patient got 

transferred to the intensive care unit.  I was very hesitant intubated this 

patient based on an underlying condition and status.  Note that the patient is 

extremely frail and has absolutely no muscle mass and intubation may be quite 

devastating for this patient had the patient accordingly was placed on a BiPAP 

at a pressure of 10/5 cm of water.  She was also given some Ativan and morphine 

which.  Much calm that down.  Her subsequent blood gas showed a pH of 7.38 with 

a pCO2 of 69 a pO2 of 62.  I realized the patient's respiratory insufficiency he

had it performed a CAT scan of the chest yesterday and the CAT scan showed 

complete occlusion of the distal bronchus intermedius and right middle lobe 

atelectasis.  There was also atelectatic changes in lung bases bilaterally with 

a right middle lobe atelectasis right lower lobe atelectasis and bilateral 

pleural effusions.  Upper lobes are quite patent and well hydrated.  The patient

was started back on IV Zosyn.  She did become hypotensive briefly and she was 

given a liter of IV fluid bolus and currently she is on normal saline at rate of

100 mL an hour.  She is improved her blood pressure and she did not require any 

pressors.  She is currently awake.  She is following commands.  However, she has

a very poor insight on her condition.  She is not aware that she is quite 

debilitated in [place] major disadvantage for recovering from her chronic 

illness.  The patient also is receiving enteral feeding for nutritional support 

in the form of vitamin 1.2 which is currently at goal.  I added this patient IV 

Zosyn.  She is on Lovenox for DVT prophylaxis.  CAT scan of the chest also 

showed emphysema bilaterally.  She is on bronchodilators.  She is also on IV 

Solu-Medrol.  IV Protonix for GI prophylaxis.  





On 09/29/2019, the patient is being seen in follow-up she is awake and alert.  

She is following commands.  She is on a BiPAP at a pressure of 10/5 cm of water 

with an FiO2 of 40%.  The patient is very much compliant and tolerating her 

BiPAP.  No significant cough or sputum production.  The chest x-ray from today 

shows small bilateral pleural effusion, right middle lobe/right lower lobe a

telectasis.  Her pro-calcitonin level was low.  She is covered with IV Zosyn.  

She is also covered with IV Solu-Medrol and the dose was reduced down to 40 mg 

every 12 hours.  He is on DuoNeb nebulized treatments around the clock and she 

is receiving enteral feeding for nutritional support.  Sodium level is up to 

147.  Atelectatic discussion with the patient's family.  The family wants 

everything done including the possibility of long-term vent support.  In that 

case, it made recommendations to proceed with a tracheostomy and the patient is 

unable to breathe independently without any form of respiratory  assisting 

devices and the patient is quite debilitated and has significant malnutrition a

nd neuromuscular weakness.  





Patient was reevaluated today on 9/30/2019, patient remains on BiPAP, presently 

at a pressure of 10/5 and FiO2 of 40%.  She seems to be almost BiPAP dependent. 

Her chest x-ray is showing significant worsening of the right lower lobe and 

right lobe collapse and atelectasis, there is also some component of pleural 

effusion.  Remains on antibiotics in the form of Zosyn, patient is on 

bronchodilators is also on IV Solu-Medrol.  Not much of a change over the last 

24 hours.  Apparently the patient is to be seen by surgery today, and Dr. Liu recommended tracheostomy which will likely be done tomorrow or in the 

next couple of days.  Patient will be bronchoscoped after tracheostomy and 

mechanical ventilation.  And she read he has a PEG tube in place.  Presently the

patient is about the same, and not much change over the last 24 hours.  WBC 

count is 9.5 hemoglobin 9.6 electrolytes are normal bicarb is 37 renal 

functioning is normal.  Patient was already seen by general surgery, and surgery

is being considered for either tomorrow or Wednesday.





Patient was reevaluated today on 10/1/2019, remains on BiPAP this morning, 

scheduled to undergo tracheostomy this morning.  Patient again seems to be 

almost BiPAP dependent.  Chest x-ray continues to show evidence of right lower 

lobe and right lobe collapse and atelectasis.  Small tiny pleural effusion is 

noted.  Remains on antibiotics, bronchodilators, IV Solu-Medrol, and again the 

patient is scheduled for tracheostomy today.  Family is at bedside, and I 

discussed the option of bronchoscopy after tracheostomy which I will likely 

perform tomorrow on this patient to evaluate the right middle lobe and right l

ower lobe.





Patient was reevaluated today on 10/2/2019, she underwent tracheostomy 

yesterday, presently the patient is on mechanical ventilation, she is on before 

a c  mode of mechanical ventilation , 12 tidal volume of 350 FiO2 is down to 

50%, and PEEP is 5.  Patient seems to be very calm, comfortable, very 

appropriate, and her chest x-ray continues to show significant right lower lobe 

and right middle lobe collapse.  Hence considering the patient is now on 

mechanical ventilation, I have discussed with the patient the option of 

bronchoscopy and evaluation of the right middle lobe and right lower lobe, and 

she is agreeable to proceed with the procedure today.  The bronchoscopy team was

notified, and it will be done sometime this afternoon.  In the meantime patient 

remains nothing by mouth, her PEG tube feeding is on hold, and will empty her 

stomach. ABG this morning showed a pO2 of 53 pCO2 of 38 pH of 7.54.  

Electrolytes are normal renal profile is normal potassium is a bit low at 3.3 

WBC count is 10.3 hemoglobin is 9.3.





Patient was reevaluated today on 10/3/2019, she is postoperative day #1, patient

underwent tracheostomy placement for chronic respiratory failure and she is 

presently on assist control rate of 12, FiO2 of 35%, tidal volume of 350.  PEEP 

is 5.  Patient underwent bronchoscopy yesterday, and purulent secretions were 

suctioned out of the right middle lobe and right lower lobe.  Microbiology on 

the BAL is pending.  Patient seems to be very comfortable on mechanical 

ventilation.  Not requiring any propofol.  Hence I recommended a trial of 

pressure support of 8 and CPAP to be given today.  And depending on how she 

tolerates the weaning trial, may or may not proceed to trach collar.  If the 

patient fails weaning may proceed with transferring the patient to a select care

specialty for further weaning down the line.  Chest x-ray showed slight 

improvement in the aeration of the right lower lobe continues to have a small 

right pleural effusion.  CBC is relatively normal hemoglobin is 8.5 electrolytes

are normal renal profile is normal.





Patient was reevaluated today on 10/4/2019, remains in the ICU, remains on 

mechanical ventilation, status post tracheostomy and she is postoperative day 

#2.  Her ventilator mode remains the same, ventilator settings are the same.  As

noted above.  Patient was given a trial of pressure support and CPAP mode of 

weaning trial, but she did not do well after 1 hour she had to be placed back on

assist control mode of mechanical ventilation.  Patient seems to be quite 

comfortable on mechanical ventilation, and I'm going to try again another trial 

of pressure support and CPAP mode of weaning.  However I have a strong feeling 

that the patient will not be eased to wean, and [facility]  Chest x-ray continues to show 

chronic atelectasis of the right lower lobe although yesterday that was slightly

improved aeration.  CBC showed WBC count of 6.5 hemoglobin 8.5 electrodes are 

normal renal profile is normal.





Reevaluated today on 10/5/2019, patient remains in the ICU, on mechanical 

ventilation, she is status post tracheostomy and she is postoperative day #3.  

Weaning has failed with pressure support and CPAP, hence we never got to the 

point where we could place the patient on trach collar.  Patient has been 

accepted in [facility] care specialty, and she would have further weaning in that 

facility.  Patient is hemodynamically stable, she remains on steroids which had 

to be tapered off antibiotics.  And overall the only issue remains weaning down 

the line.  This will be difficult but could be done over time. Labs today were 

reviewed.  No chest x-ray was done today.





Patient was reevaluated today on 10/6/2019, still in the ICU, remains on 

mechanical .  Patient is waiting for a bed to become available at [facility] 

for transfer.  Today I will go ahead and give the patient another trial of 

pressure support and CPAP.  Chest x-ray continues to show chronic consolidation 

and atelectasis of the right middle lobe and right lower lobe.BAL of the right 

lower lobe from previous bronchoscopy is negative.  She had no evidence of 

endobronchial tumor .labs were reviewed, she had a relatively normal  CBC except

for a low hemoglobin of 8.7.  Electrolytes are normal.  Patient remains on 

nutritional support via PEG tube.





Objective





- Vital Signs


Vital signs: 


                                   Vital Signs











Temp  98.4 F   10/06/19 08:00


 


Pulse  81   10/06/19 10:00


 


Resp  28 H  10/06/19 10:00


 


BP  94/72   10/06/19 10:00


 


Pulse Ox  92 L  10/06/19 10:00








                                 Intake & Output











 10/05/19 10/06/19 10/06/19





 18:59 06:59 18:59


 


Intake Total 645 670 200


 


Output Total 595 735 65


 


Balance 50 -65 135


 


Weight  36.9 kg 


 


Intake:   


 


   220 50


 


    .9 60 120 


 


    Piperacillin-Tazobactam 3 175 100 50





    .375 gm In Sodium   





    Chloride 0.9% 100 ml @ 25   





    mls/hr IVPB Q8H [facility] Rx#:   





    [id number]   


 


  Tube Feeding 320 360 120


 


  Other 90 90 30


 


Output:   


 


  Urine 595 735 65


 


Other:   


 


  Voiding Method Indwelling Catheter Indwelling Catheter Indwelling Catheter


 


  # Voids 1  














- Exam





Physical Exam: 54-year-old on mechanical ventilation, asymptomatic, in no di

stress


Head: Atraumatic, normocephalic.  Tracheostomy tube is intact.


HEENT:[Neck is supple.] [No neck masses.] [No thyromegaly.] [No JVD.]  PERRLA, 

EOMI, no icterus.


Chest: Diminished breath sounds at the right base, no crackles or rhonchi or 

wheezes.


Cardiac Exam: [Normal S1 and S2, no S3 gallop, no murmur.]


Abdomen: [Soft, nontender,  no megaly, no rebound, no guarding, normal bowel 

sounds.PEG tube is intact.]


Extremities: [No clubbing, no edema, no cyanosis.]


Neurological Exam: [No focal neurologic deficit.]  Alert and oriented 3.


Psychiatric: Normal mood affect and normal mental status examination.


Skin: No rashes.








- Labs


CBC & Chem 7: 


                                 10/06/19 03:49





                                 10/06/19 03:49


Labs: 


                  Abnormal Lab Results - Last 24 Hours (Table)











  10/05/19 10/05/19 10/05/19 Range/Units





  11:44 17:11 23:05 


 


RBC     (3.80-5.40)  m/uL


 


Hgb     (11.4-16.0)  gm/dL


 


Hct     (34.0-46.0)  %


 


MCV     (80.0-100.0)  fL


 


MCHC     (31.0-37.0)  g/dL


 


Lymphocytes #     (1.0-4.8)  k/uL


 


Sodium     (137-145)  mmol/L


 


Carbon Dioxide     (22-30)  mmol/L


 


Creatinine     (0.52-1.04)  mg/dL


 


Glucose     (74-99)  mg/dL


 


POC Glucose (mg/dL)  116 H  114 H  106 H  (75-99)  mg/dL


 


Calcium     (8.4-10.2)  mg/dL














  10/06/19 10/06/19 10/06/19 Range/Units





  03:49 03:49 06:01 


 


RBC  2.72 L    (3.80-5.40)  m/uL


 


Hgb  8.7 L    (11.4-16.0)  gm/dL


 


Hct  28.2 L    (34.0-46.0)  %


 


MCV  103.9 H    (80.0-100.0)  fL


 


MCHC  30.9 L    (31.0-37.0)  g/dL


 


Lymphocytes #  0.2 L    (1.0-4.8)  k/uL


 


Sodium   135 L   (137-145)  mmol/L


 


Carbon Dioxide   32 H   (22-30)  mmol/L


 


Creatinine   0.29 L   (0.52-1.04)  mg/dL


 


Glucose   102 H   (74-99)  mg/dL


 


POC Glucose (mg/dL)    122 H  (75-99)  mg/dL


 


Calcium   8.3 L   (8.4-10.2)  mg/dL














Assessment and Plan


Assessment: 





Impression:





1 acute on chronic hypoxic respiratory failure, status post tracheostomy, 

postoperative day #4,plan to give the patient another trial of pressure support 

of 8 and CPAP.  Patient failed previous trials of weaning, and she is waiting to

be transferred eventually to [facility] specialty.





2 severe underlying COPD





3 history of laryngeal cancer and previous chemoradiation therapy





4 severe protein calorie malnutrition BMI of 11.9





5 chronic smoking





6 chronic anemia of chronic disease





7 right lower lobe and right middle lobe collapse, status post bronchoscopy 

cultures remain nondiagnostic 





8 chronic hypoxic and hypercapnic respiratory failure, presently on mechanical 

ventilation. 





 Recommendation:


continue trials of weaning on a daily basis until the patient is transferred.  

Today I will try pressure support and CPAP again.


Continue  tracheostomy care.


Continue mechanical ventilation, , 


Continue enteral feeding via PEG tube which is already in place.


Continue GI and DVT prophylaxis.  


Continue bronchodilators.  


Discontinue antibiotics.  Continue prednisone tapering.


transfer patient to [facility] care once a bed becomes available.  This will likely 

happen tomorrow.
































Time with Patient: Less than 30

## 2019-10-06 NOTE — P.PN
Subjective


Progress Note Date: 10/06/19


Principal diagnosis: 





Acute on chronic hypoxic respiratory failure





Patient was seen and examined.  No acute events overnight.  Postop day 5 

tracheostomy. Underwent bronchoscopy POD 4, purulent secretions evacuated with 

microbiology pending. Currently back on mechanical ventilation. 





Objective





- Vital Signs


Vital signs: 


                                   Vital Signs











Temp  98.4 F   10/06/19 08:00


 


Pulse  96   10/06/19 11:55


 


Resp  29 H  10/06/19 11:00


 


BP  84/67   10/06/19 11:00


 


Pulse Ox  97   10/06/19 11:00








                                 Intake & Output











 10/05/19 10/06/19 10/06/19





 18:59 06:59 18:59


 


Intake Total 645 670 380


 


Output Total 595 735 275


 


Balance 50 -65 105


 


Weight  36.9 kg 36.9 kg


 


Intake:   


 


   220 110


 


    .9 60 120 10


 


    Piperacillin-Tazobactam 3 175 100 100





    .375 gm In Sodium   





    Chloride 0.9% 100 ml @ 25   





    mls/hr IVPB Q8H [facility] Rx#:   





    [id number]   


 


  Tube Feeding 320 360 240


 


  Other 90 90 30


 


Output:   


 


  Urine 595 735 275


 


Other:   


 


  Voiding Method Indwelling Catheter Indwelling Catheter Indwelling Catheter


 


  # Voids 1  














- Exam





General: [Cachectic], [on CPAP, tracheostomy site clean and dry], [appears at 

stated age]


Derm: [warm], [dry]


Head: [atraumatic], [normocephalic], [bitemporal wasting]


Eyes:  [no lid lag], [anicteric sclera]


Mouth: [no lip lesion], [mucus membranes moist]


Cardiovascular: [S1S2 reg], [no murmur], [positive DP pulse bilateral], 


Lungs: [Decreased breath sounds bilateral], [no rhonchi, no rales] , [no 

accessory muscle use]


Abdominal: [soft], [ nontender to palpation], [no guarding], [Andesron catheter in 

place]


Ext: [no gross muscle atrophy], [no edema], [no contractures]


Neuro: [no focal neuro deficits]


Psych: Alert and oriented





- Labs


CBC & Chem 7: 


                                 10/06/19 03:49





                                 10/06/19 03:49


Labs: 


                  Abnormal Lab Results - Last 24 Hours (Table)











  10/05/19 10/05/19 10/06/19 Range/Units





  17:11 23:05 03:49 


 


RBC    2.72 L  (3.80-5.40)  m/uL


 


Hgb    8.7 L  (11.4-16.0)  gm/dL


 


Hct    28.2 L  (34.0-46.0)  %


 


MCV    103.9 H  (80.0-100.0)  fL


 


MCHC    30.9 L  (31.0-37.0)  g/dL


 


Lymphocytes #    0.2 L  (1.0-4.8)  k/uL


 


Sodium     (137-145)  mmol/L


 


Carbon Dioxide     (22-30)  mmol/L


 


Creatinine     (0.52-1.04)  mg/dL


 


Glucose     (74-99)  mg/dL


 


POC Glucose (mg/dL)  114 H  106 H   (75-99)  mg/dL


 


Calcium     (8.4-10.2)  mg/dL














  10/06/19 10/06/19 Range/Units





  03:49 06:01 


 


RBC    (3.80-5.40)  m/uL


 


Hgb    (11.4-16.0)  gm/dL


 


Hct    (34.0-46.0)  %


 


MCV    (80.0-100.0)  fL


 


MCHC    (31.0-37.0)  g/dL


 


Lymphocytes #    (1.0-4.8)  k/uL


 


Sodium  135 L   (137-145)  mmol/L


 


Carbon Dioxide  32 H   (22-30)  mmol/L


 


Creatinine  0.29 L   (0.52-1.04)  mg/dL


 


Glucose  102 H   (74-99)  mg/dL


 


POC Glucose (mg/dL)   122 H  (75-99)  mg/dL


 


Calcium  8.3 L   (8.4-10.2)  mg/dL














Assessment and Plan


Assessment: 





Assessment and Plan





Acute on chronic hypoxic respiratory failure with hypercapnia due to laryngeal 

cancer, pleural effusion and COPD


Severe protein calorie malnutrition with cachexia with G-tube due to dysphagia 

BMI 12.4


Mediastinal lymphadenopathy


Anemia


Resolved: Hypokalemia





Chest CT shows increased pleural effusion, right middle lobe pneumonia versus 

atelectasis.  Pro-calcitonin negative, low concerns for pneumonia.  POD 5 

tracheostomy. POD 4 post bronchoscopy. Plans:  Continue full ventilator support 

managed by pulmonology, attempt to wean today.  Discussed with Dr. Lynda cerrato, discontinue antibiotics as cultures have been negative.  Continue 

Solu-Medrol 40 mg IV twice a day along with DuoNeb as needed for shortness of 

breath and wheezing for treatment of COPD. We will discontinue Solu-Medrol and 

start the patient on a prednisone taper on discharge.  Telemetry monitoring.  

Head of bed elevation.  Pulmonology and general surgery on board.





BMI 12.7.  Plans: Continue G-tube feeds.  Consult dietitian.





As seen on previous CT chest.  Plans: Will likely need bronchoscopy with 

pulmonology after trach.  Follow pulmonology recommendations.





Hemoglobin 9.3-8.5-8.5-8.7.  Acute blood loss from surgery.  Macrocytic during 

admission. Iron studies shows iron deficiency.  B12 and folate within normal 

limits.  Plans: Hemoglobin stable. Transfuse if hemoglobin less than 7.  Daily 

CBC.





[Patient admitted for acute on chronic hypoxic respiratory failure due to 

multiple reasons.  She is POD 5 tracheostomy.  POD 4 bronchoscopy, cultures 

pending.   following for LTAC placement.  Cleared for DC per 

pulmonology, pending insurance acceptance for LTAC.]

## 2019-10-07 VITALS — SYSTOLIC BLOOD PRESSURE: 118 MMHG | DIASTOLIC BLOOD PRESSURE: 93 MMHG | RESPIRATION RATE: 28 BRPM | HEART RATE: 98 BPM

## 2019-10-07 VITALS — TEMPERATURE: 98.3 F

## 2019-10-07 LAB
ANION GAP SERPL CALC-SCNC: 5 MMOL/L
BASOPHILS # BLD AUTO: 0 K/UL (ref 0–0.2)
BASOPHILS NFR BLD AUTO: 0 %
BUN SERPL-SCNC: 17 MG/DL (ref 7–17)
CALCIUM SPEC-MCNC: 8.2 MG/DL (ref 8.4–10.2)
CHLORIDE SERPL-SCNC: 99 MMOL/L (ref 98–107)
CO2 SERPL-SCNC: 31 MMOL/L (ref 22–30)
EOSINOPHIL # BLD AUTO: 0 K/UL (ref 0–0.7)
EOSINOPHIL NFR BLD AUTO: 0 %
ERYTHROCYTE [DISTWIDTH] IN BLOOD BY AUTOMATED COUNT: 2.98 M/UL (ref 3.8–5.4)
ERYTHROCYTE [DISTWIDTH] IN BLOOD: 15.2 % (ref 11.5–15.5)
GLUCOSE BLD-MCNC: 130 MG/DL (ref 75–99)
GLUCOSE BLD-MCNC: 133 MG/DL (ref 75–99)
GLUCOSE SERPL-MCNC: 100 MG/DL (ref 74–99)
HCT VFR BLD AUTO: 30.5 % (ref 34–46)
HGB BLD-MCNC: 9.6 GM/DL (ref 11.4–16)
LYMPHOCYTES # SPEC AUTO: 0.2 K/UL (ref 1–4.8)
LYMPHOCYTES NFR SPEC AUTO: 3 %
MCH RBC QN AUTO: 32.2 PG (ref 25–35)
MCHC RBC AUTO-ENTMCNC: 31.6 G/DL (ref 31–37)
MCV RBC AUTO: 102.1 FL (ref 80–100)
MONOCYTES # BLD AUTO: 0.2 K/UL (ref 0–1)
MONOCYTES NFR BLD AUTO: 4 %
NEUTROPHILS # BLD AUTO: 6.3 K/UL (ref 1.3–7.7)
NEUTROPHILS NFR BLD AUTO: 93 %
PLATELET # BLD AUTO: 285 K/UL (ref 150–450)
POTASSIUM SERPL-SCNC: 3.6 MMOL/L (ref 3.5–5.1)
SODIUM SERPL-SCNC: 135 MMOL/L (ref 137–145)
WBC # BLD AUTO: 6.8 K/UL (ref 3.8–10.6)

## 2019-10-07 RX ADMIN — MORPHINE SULFATE PRN MG: 4 INJECTION, SOLUTION INTRAMUSCULAR; INTRAVENOUS at 03:22

## 2019-10-07 RX ADMIN — METHYLPREDNISOLONE SODIUM SUCCINATE SCH MG: 40 INJECTION, POWDER, FOR SOLUTION INTRAMUSCULAR; INTRAVENOUS at 08:44

## 2019-10-07 RX ADMIN — INSULIN ASPART SCH: 100 INJECTION, SOLUTION INTRAVENOUS; SUBCUTANEOUS at 00:10

## 2019-10-07 RX ADMIN — IPRATROPIUM BROMIDE AND ALBUTEROL SULFATE SCH ML: .5; 3 SOLUTION RESPIRATORY (INHALATION) at 07:14

## 2019-10-07 RX ADMIN — IPRATROPIUM BROMIDE AND ALBUTEROL SULFATE SCH: .5; 3 SOLUTION RESPIRATORY (INHALATION) at 15:47

## 2019-10-07 RX ADMIN — POTASSIUM CHLORIDE SCH MLS/HR: 7.46 INJECTION, SOLUTION INTRAVENOUS at 08:45

## 2019-10-07 RX ADMIN — PIPERACILLIN AND TAZOBACTAM SCH MLS/HR: 3; .375 INJECTION, POWDER, FOR SOLUTION INTRAVENOUS at 06:25

## 2019-10-07 RX ADMIN — ISODIUM CHLORIDE PRN MG: 0.03 SOLUTION RESPIRATORY (INHALATION) at 03:38

## 2019-10-07 RX ADMIN — PANTOPRAZOLE SODIUM SCH MG: 40 INJECTION, POWDER, FOR SOLUTION INTRAVENOUS at 08:44

## 2019-10-07 RX ADMIN — INSULIN ASPART SCH: 100 INJECTION, SOLUTION INTRAVENOUS; SUBCUTANEOUS at 06:10

## 2019-10-07 RX ADMIN — POTASSIUM CHLORIDE SCH MLS/HR: 7.46 INJECTION, SOLUTION INTRAVENOUS at 11:15

## 2019-10-07 RX ADMIN — MORPHINE SULFATE PRN MG: 4 INJECTION, SOLUTION INTRAMUSCULAR; INTRAVENOUS at 13:38

## 2019-10-07 RX ADMIN — ENOXAPARIN SODIUM SCH MG: 40 INJECTION SUBCUTANEOUS at 08:44

## 2019-10-07 RX ADMIN — INSULIN ASPART SCH: 100 INJECTION, SOLUTION INTRAVENOUS; SUBCUTANEOUS at 11:46

## 2019-10-07 RX ADMIN — IPRATROPIUM BROMIDE AND ALBUTEROL SULFATE SCH ML: .5; 3 SOLUTION RESPIRATORY (INHALATION) at 11:10

## 2019-10-07 NOTE — P.PN
Subjective


Progress Note Date: 10/07/19


Principal diagnosis: 


 Acute on chronic hypoxic and hypercapnic respiratory failure





This is a 54-year-old female patient of Dr. Jerome, who was recently 

hospitalized for acute respiratory failure secondary to aspiration pneumonia, 

hypoxemic and hypercapnic requiring intubation and placement on mechanical 

ventilation.  Patient was successfully extubated, was treated with antibiotics, 

completed a course of Levaquin and Zosyn.  Patient showed significant clinical 

and radiographic improvement, by the time of her discharge to the [facility].  Patient 

also has history of laryngeal cancer with previous chemoradiation, and had 

evidence of severe malnutrition.  In addition patient failed modified barium 

swallow, and was having evidence of aspiration with all consistencies.  Patient 

also had evidence of electrolyte abnormality with hyponatremia and hypokalemia, 

which improved with treatment.  Patient had a gastrostomy tube placed for 

nutritional support, and she was strict NPO.  Sputum cultures from previous 

admission were negative.  Other medical history includes chronic anemia of 

chronic disease, previous tobacco dependence.  Patient was discharged to the 

[facility] on 09/25/2019 however she started experiencing shortness 

of breath at the Critical access hospital, and was apparently hypoxic and was brought back for 

evaluation on oral 09/26/2019.  He denied any chest pain, she denies any oral 

intake at the nursing home home, she states she was getting her nutrition 

strictly through the gastrostomy tube.  No aspiration, no fever or chills, chest

x-ray showed basilar atelectasis and associated pleural effusions.  Lab work 

showed white blood cell count of 14.0, hemoglobin of 11, correlation profile was

within normal limits, blood gas showed pO2 of 64, pCO2 of 67, and pH of 7.32.  

Sodium was 140, potassium is 4.0, chloride is 98, CO2 37, B1 is 21, creatinine 

0.24.  O2 sat in the emergency department was ranging from 84-86 patient was 

placed on supplemental oxygen.  Patient was given IV steroids, nebulized 

bronchodilators, and worsening the patient in consultation for shortness of 

breath.  She is awake and alert, she is sitting up in bed, appears to be in no 

acute distress, currently on 2 L of oxygen, no significant wheezing, or 

congestion, she does have a weak cough, which does not sound congested, lung 

sounds are diminished, no rales auscultated.  Follow-up blood work was reviewed 

today, white blood cell count is 15.5, hemoglobin is 9.7, repeat blood gas 

showed pO2 of 60, pCO2 59, and pH of 7.39 this was done on FiO2 28%.  Patient 

looks extremely cachectic and weak, her voice is very weak and hoarse, but she 

appears to be in no acute distress.





On 09/28/2019 I'm seeing this patient for a follow-up.  This is an extremely 

debilitated and severely malnourished female patient who is post respiratory 

failure who was readmitted for ongoing difficulties in breathing.  Noted the 

patient's BMI is 11.9 and she has kwashiorkor.  The patient got moved yesterday 

to the intensive care unit because of worsening shortness of breath and altered 

mentation.  Note that on the floor the patient had a blood gas showing a pH of  

0.28 with a pCO2 of 83 and pO2 of 72.  This was on FiO2 of 32%.  She was having 

increased tachypnea and respiratory distress.  At that point the patient got 

transferred to the intensive care unit.  I was very hesitant intubated this 

patient based on an underlying condition and status.  Note that the patient is 

extremely frail and has absolutely no muscle mass and intubation may be quite 

devastating for this patient had the patient accordingly was placed on a BiPAP 

at a pressure of 10/5 cm of water.  She was also given some Ativan and morphine 

which.  Much calm that down.  Her subsequent blood gas showed a pH of 7.38 with 

a pCO2 of 69 a pO2 of 62.  I realized the patient's respiratory insufficiency he

had it performed a CAT scan of the chest yesterday and the CAT scan showed 

complete occlusion of the distal bronchus intermedius and right middle lobe 

atelectasis.  There was also atelectatic changes in lung bases bilaterally with 

a right middle lobe atelectasis right lower lobe atelectasis and bilateral 

pleural effusions.  Upper lobes are quite patent and well hydrated.  The patient

was started back on IV Zosyn.  She did become hypotensive briefly and she was 

given a liter of IV fluid bolus and currently she is on normal saline at rate of

100 mL an hour.  She is improved her blood pressure and she did not require any 

pressors.  She is currently awake.  She is following commands.  However, she has

a very poor insight on her condition.  She is not aware that she is quite 

debilitated in [place] major disadvantage for recovering from her chronic 

illness.  The patient also is receiving enteral feeding for nutritional support 

in the form of vitamin 1.2 which is currently at goal.  I added this patient IV 

Zosyn.  She is on Lovenox for DVT prophylaxis.  CAT scan of the chest also 

showed emphysema bilaterally.  She is on bronchodilators.  She is also on IV 

Solu-Medrol.  IV Protonix for GI prophylaxis.  





On 09/29/2019, the patient is being seen in follow-up she is awake and alert.  

She is following commands.  She is on a BiPAP at a pressure of 10/5 cm of water 

with an FiO2 of 40%.  The patient is very much compliant and tolerating her 

BiPAP.  No significant cough or sputum production.  The chest x-ray from today 

shows small bilateral pleural effusion, right middle lobe/right lower lobe 

atelectasis.  Her pro-calcitonin level was low.  She is covered with IV Zosyn.  

She is also covered with IV Solu-Medrol and the dose was reduced down to 40 mg 

every 12 hours.  He is on DuoNeb nebulized treatments around the clock and she 

is receiving enteral feeding for nutritional support.  Sodium level is up to 

147.  Atelectatic discussion with the patient's family.  The family wants 

everything done including the possibility of long-term vent support.  In that 

case, it made recommendations to proceed with a tracheostomy and the patient is 

unable to breathe independently without any form of respiratory  assisting 

devices and the patient is quite debilitated and has significant malnutrition 

and neuromuscular weakness.  





Patient was reevaluated today on 9/30/2019, patient remains on BiPAP, presently 

at a pressure of 10/5 and FiO2 of 40%.  She seems to be almost BiPAP dependent. 

Her chest x-ray is showing significant worsening of the right lower lobe and 

right lobe collapse and atelectasis, there is also some component of pleural 

effusion.  Remains on antibiotics in the form of Zosyn, patient is on 

bronchodilators is also on IV Solu-Medrol.  Not much of a change over the last 

24 hours.  Apparently the patient is to be seen by surgery today, and Dr. Liu recommended tracheostomy which will likely be done tomorrow or in the 

next couple of days.  Patient will be bronchoscoped after tracheostomy and mecha

nical ventilation.  And she read he has a PEG tube in place.  Presently the 

patient is about the same, and not much change over the last 24 hours.  WBC 

count is 9.5 hemoglobin 9.6 electrolytes are normal bicarb is 37 renal 

functioning is normal.  Patient was already seen by general surgery, and surgery

is being considered for either tomorrow or Wednesday.





Patient was reevaluated today on 10/1/2019, remains on BiPAP this morning, 

scheduled to undergo tracheostomy this morning.  Patient again seems to be 

almost BiPAP dependent.  Chest x-ray continues to show evidence of right lower 

lobe and right lobe collapse and atelectasis.  Small tiny pleural effusion is 

noted.  Remains on antibiotics, bronchodilators, IV Solu-Medrol, and again the 

patient is scheduled for tracheostomy today.  Family is at bedside, and I 

discussed the option of bronchoscopy after tracheostomy which I will likely 

perform tomorrow on this patient to evaluate the right middle lobe and right 

lower lobe.





Patient was reevaluated today on 10/2/2019, she underwent tracheostomy 

yesterday, presently the patient is on mechanical ventilation, she is on before 

a c  mode of mechanical ventilation , 12 tidal volume of 350 FiO2 is down to 

50%, and PEEP is 5.  Patient seems to be very calm, comfortable, very 

appropriate, and her chest x-ray continues to show significant right lower lobe 

and right middle lobe collapse.  Hence considering the patient is now on 

mechanical ventilation, I have discussed with the patient the option of 

bronchoscopy and evaluation of the right middle lobe and right lower lobe, and 

she is agreeable to proceed with the procedure today.  The bronchoscopy team was

notified, and it will be done sometime this afternoon.  In the meantime patient 

remains nothing by mouth, her PEG tube feeding is on hold, and will empty her 

stomach. ABG this morning showed a pO2 of 53 pCO2 of 38 pH of 7.54.  Elect

rolytes are normal renal profile is normal potassium is a bit low at 3.3 WBC 

count is 10.3 hemoglobin is 9.3.





Patient was reevaluated today on 10/3/2019, she is postoperative day #1, patient

underwent tracheostomy placement for chronic respiratory failure and she is 

presently on assist control rate of 12, FiO2 of 35%, tidal volume of 350.  PEEP 

is 5.  Patient underwent bronchoscopy yesterday, and purulent secretions were 

suctioned out of the right middle lobe and right lower lobe.  Microbiology on 

the BAL is pending.  Patient seems to be very comfortable on mechanical 

ventilation.  Not requiring any propofol.  Hence I recommended a trial of 

pressure support of 8 and CPAP to be given today.  And depending on how she 

tolerates the weaning trial, may or may not proceed to trach collar.  If the 

patient fails weaning may proceed with transferring the patient to a select care

specialty for further weaning down the line.  Chest x-ray showed slight 

improvement in the aeration of the right lower lobe continues to have a small 

right pleural effusion.  CBC is relatively normal hemoglobin is 8.5 electrolytes

are normal renal profile is normal.





Patient was reevaluated today on 10/4/2019, remains in the ICU, remains on 

mechanical ventilation, status post tracheostomy and she is postoperative day 

#2.  Her ventilator mode remains the same, ventilator settings are the same.  As

noted above.  Patient was given a trial of pressure support and CPAP mode of 

weaning trial, but she did not do well after 1 hour she had to be placed back on

assist control mode of mechanical ventilation.  Patient seems to be quite 

comfortable on mechanical ventilation, and I'm going to try again another trial 

of pressure support and CPAP mode of weaning.  However I have a strong feeling 

that the patient will not be eased to wean, and [facility]  Chest x-ray continues to show 

chronic atelectasis of the right lower lobe although yesterday that was slightly

improved aeration.  CBC showed WBC count of 6.5 hemoglobin 8.5 electrodes are 

normal renal profile is normal.





Reevaluated today on 10/5/2019, patient remains in the ICU, on mechanical vent

ilation, she is status post tracheostomy and she is postoperative day #3.  

Weaning has failed with pressure support and CPAP, hence we never got to the 

point where we could place the patient on trach collar.  Patient has been 

accepted in select care specialty, and she would have further weaning in that 

facility.  Patient is hemodynamically stable, she remains on steroids which had 

to be tapered off antibiotics.  And overall the only issue remains weaning down 

the line.  This will be difficult but could be done over time. Labs today were 

reviewed.  No chest x-ray was done today.





Patient was reevaluated today on 10/6/2019, still in the ICU, remains on 

mechanical .  Patient is waiting for a bed to become available at [facility] 

for transfer.  Today I will go ahead and give the patient another trial of 

pressure support and CPAP.  Chest x-ray continues to show chronic consolidation 

and atelectasis of the right middle lobe and right lower lobe.BAL of the right 

lower lobe from previous bronchoscopy is negative.  She had no evidence of 

endobronchial tumor .labs were reviewed, she had a relatively normal  CBC except

for a low hemoglobin of 8.7.  Electrolytes are normal.  Patient remains on 

nutritional support via PEG tube.





On 10/07/2019 patient seen in follow-up in the intensive care unit, she is awake

and alert, she is trached to the vent, current vent settings are assist control 

mode of ventilation with a rate of 12, tidal on 350, FiO2 40% and PEEP of 5, 

there were no blood gases done this morning, 0.9 normal saline at a rate of 10 

ML per hour, no other drips, tube feedings are vital AF 1.2 at 55 with a goal of

55 with standard water flushes.  No acute events overnight, vital signs are 

stable, no fever or chills, patient is on Zosyn, and IV Solu-Medrol, which are 

being weaned down currently at 40 mg every 12 hours, she is on nebulized 

bronchodilators, microbiology results have been reviewed, and bronchial lavage 

were positive for Candida albicans only, urine culture was positive for Candida,

no other growth.  No compressive difficulty breathing, lung sounds are clear.  

Transfer is pending to specialty facility sometime today.











Objective





- Vital Signs


Vital signs: 


                                   Vital Signs











Temp  98.5 F   10/07/19 04:00


 


Pulse  90   10/07/19 07:33


 


Resp  26 H  10/07/19 07:00


 


BP  85/61   10/07/19 07:00


 


Pulse Ox  95   10/07/19 07:00








                                 Intake & Output











 10/06/19 10/07/19 10/07/19





 18:59 06:59 18:59


 


Intake Total 1075 850 165


 


Output Total 1085 495 45


 


Balance -10 355 120


 


Weight 36.9 kg 34.7 kg 


 


Intake:   


 


   210 110


 


    .9 50 110 10


 


    Piperacillin-Tazobactam 3 200 100 100





    .375 gm In Sodium   





    Chloride 0.9% 100 ml @ 25   





    mls/hr IVPB Q8H [facility] Rx#:   





    [id number]   


 


  Tube Feeding 735 550 55


 


  Other 90 90 


 


Output:   


 


  Urine 1085 495 45


 


Other:   


 


  Voiding Method Indwelling Catheter Indwelling Catheter 














- Exam


 GENERAL EXAM: Alert, active, very pleasant, 54-year-old cachectic female, 

trached to the vent on assist control mode of ventilation comfortable in no 

apparent distress.


HEAD: Normocephalic/atraumatic.


EYES: Normal reaction of pupils, equal size.  Conjunctiva pink, sclera white.


NOSE: Clear with pink turbinates.


THROAT: No erythema or exudates.


NECK: No masses, no JVD, no thyroid enlargement, no adenopathy.  Midline 

tracheostomy, clean dry and intact


CHEST: No chest wall deformity.  Symmetrical expansion. 


LUNGS: Equal air entry with no crackles, wheeze, rhonchi or dullness.


CVS: Regular rate and rhythm, normal S1 and S2, no gallops, no murmurs, no rubs


ABDOMEN: Soft, nontender.  No hepatosplenomegaly, normal bowel sounds, no 

guarding or rigidity.  The peg tube site clean dry and intact, patient is 

receiving tube feedings


EXTREMITIES: No clubbing, no edema, no cyanosis, 2+ pulses and upper and lower 

extremities.


MUSCULOSKELETAL: Muscle strength and tone normal.


SPINE: No scoliosis or deformity


SKIN: No rashes


CENTRAL NERVOUS SYSTEM: Alert and oriented -3.  No focal deficits, tone is n

ormal in all 4 extremities.


PSYCHIATRIC: Alert and oriented -3.  Appropriate affect.  Intact judgment and 

insight.











- Labs


CBC & Chem 7: 


                                 10/07/19 04:06





                                 10/07/19 04:06


Labs: 


                  Abnormal Lab Results - Last 24 Hours (Table)











  10/06/19 10/07/19 10/07/19 Range/Units





  23:37 04:06 04:06 


 


RBC   2.98 L   (3.80-5.40)  m/uL


 


Hgb   9.6 L   (11.4-16.0)  gm/dL


 


Hct   30.5 L   (34.0-46.0)  %


 


MCV   102.1 H   (80.0-100.0)  fL


 


Lymphocytes #   0.2 L   (1.0-4.8)  k/uL


 


Sodium    135 L  (137-145)  mmol/L


 


Carbon Dioxide    31 H  (22-30)  mmol/L


 


Creatinine    0.20 L  (0.52-1.04)  mg/dL


 


Glucose    100 H  (74-99)  mg/dL


 


POC Glucose (mg/dL)  133 H    (75-99)  mg/dL


 


Calcium    8.2 L  (8.4-10.2)  mg/dL














Assessment and Plan


Plan: 


 Assessment:





1 acute on chronic hypoxic respiratory failure, status post tracheostomy, 

postoperative day #4,plan to give the patient another trial of pressure support 

of 8 and CPAP.  Patient failed previous trials of weaning, and she is waiting to

be transferred eventually to [facility] care specialty.





2 severe underlying COPD





3 history of laryngeal cancer and previous chemoradiation therapy





4 severe protein calorie malnutrition BMI of 11.9





5 chronic smoking





6 chronic anemia of chronic disease





7 right lower lobe and right middle lobe collapse, status post bronchoscopy 

cultures remain nondiagnostic 





8 chronic hypoxic and hypercapnic respiratory failure, presently on mechanical 

ventilation. 





Plan:





Patient is stable for transfer to selective specialty facility today, continue 

current medical treatment, antibiotics, IV steroids, nebulized bronchodilators, 

no growth on the cultures, and discharge her antibiotics can be discontinued.  

Chest x-ray showing stable findings of right lower and middle lobe atelectasis. 

Clinically stable. 





I performed a history & physical examination of the patient and discussed their 

management with my nurse practitioner, Romina Brown.  I reviewed the nurse 

practitioner's note and agree with the documented findings and plan of care.  

Lung sounds are positive for clear breath sounds.  The findings and the 

impression was discussed with the patient.  I attest to the documentation by the

nurse practitioner. 





Time with Patient: Less than 30

## 2019-10-07 NOTE — XR
EXAMINATION TYPE: XR chest 1V portable

 

DATE OF EXAM: 10/7/2019

 

COMPARISON: Prior chest x-ray 10/6/2019

 

HISTORY: Shortness of breath

 

TECHNIQUE: Single frontal view of the chest is obtained.

 

FINDINGS:  Findings are similar to prior exam. Tracheostomy tube is overlying the tracheal air column
, right-sided Port-A-Cath, left-sided PICC line are stable. No evident pneumothorax. Pleural parenchy
mal changes are similar. Heart is likely stable but is partially obscured, there are overlying cardia
c leads. Prominent lung volumes persist.

 

IMPRESSION:  Findings are similar to prior exam, there may be right lower, middle lobe atelectasis, e
ffusion, pneumonia. There is underlying emphysema.

## 2019-10-07 NOTE — P.DS
Providers


Date of admission: 


09/26/19 15:55





Expected date of discharge: 10/07/19


Attending physician: 


Gentry Sky MD





Consults: 





                                        





09/27/19 10:55


Consult Physician Routine 


   Consulting Provider: Tereso Liu


   Consult Reason/Comments: copd exac, hypoxia


   Do you want consulting provider notified?: Already Contacted





09/29/19 14:19


Consult Physician Routine 


   Consulting Provider: Deep Wright


   Consult Reason/Comments: tracheostomy


   Do you want consulting provider notified?: Yes











Primary care physician: 


[facility]UTHIE [facility] Course: 





Patient is a 51-year-old female with a PMH of laryngeal cancer status post 

chemotherapy with radiation who had presented to the ED for shortness of breath 

and hypoxia from medical [place].  Of note, the patient had previously been 

discharged on 9/25/19 after prolonged stay due to hypoxic respiratory failure 

due to aspiration pneumonia.  The patient had a swallow evaluation at that time 

and was noted to be unsafe with aspirations with all consistencies.  A PEG tube 

was subsequently placed on 9/23.  During this presentation however the patient 

was noted to have bilateral atelectasis with effusions with CT chest showing 

right middle lobe pneumonia versus atelectasis versus emphysema.  The patient 

was started on IV steroids along with bronchodilators and oxygen therapy.  

Pulmonary medicine was consulted and recommended pro-calcitonin which was 

negative thereby low risk for pneumonia.  The patient was unable to be weaned 

off the BiPAP thereby underwent a tracheostomy on 10/1 with a subsequent 

bronchoalveolar lavage on 10/2 showing purulent secretions.  The patient's 

cultures to date have been negative.  She was initially scheduled to be 

discharged on 10/4, though due to issues with authorization and at availability,

the discharge was held.  Patient was seen and examined at the bedside on 10/7.  

She was in good spirits and noted feeling well.  She communicated via writing on

her board and denied active complaints.  Denied fever, chills, chest pain, 

shortness of breath.  Patient is eager to be discharged to a facility.





Physical Examination





General: Cachectic female with tracheostomy, in no acute distress, appears older

than stated age


HEENT: NC/AT, anicteric sclerae, moist conjunctiva, no lid-lag, PERRLA


Cardiovascular: S1/S2 wnl, no murmurs, rubs, or gallops


Lungs: somewhat decreased breath sounds bilaterally with mild rhonchi,  

respiratory effort, no accessory muscle use 


Abdominal: Soft, non-tender, non-distended, no guarding, rebound, or rigidity, 

PEG site clean, midline incision w/ staples, healing and clean


Skin: Warm, dry


Extremities: No edema or contractures


Psychiatric: Alert and oriented to person, place and time, appropriate affect


Neuro: No focal neuro deficits





Discharge diagnosis: Acute on chronic hypoxic respiratory failure with 

hypercapnia due to laryngeal cancer, pleural effusions, and COPD; severe protein

calorie malnutrition status post PEG tube placement; mediastinal 

lymphadenopathy; anemia





A total of 40 minutes of time were spent preparing this complex discharge 

summary.


Patient Condition at Discharge: Fair





Plan - Discharge Summary


Discharge Rx Participant: No


New Discharge Prescriptions: 


New


   Ipratropium-Albuterol Nebulize [Duoneb 0.5 mg-3 mg/3 ml Soln] 3 ml INHALATION

RT-QID  ampul.neb


   Enoxaparin [Lovenox] 40 mg SQ DAILY  syringe


   Albuterol Nebulized [Ventolin Nebulized] 2.5 mg INHALATION RT-QID PRN  nebu


     PRN Reason: Bronchospasm


   predniSONE See Taper PO AS DIRECTED #30 tab


   Pantoprazole Sodium [Protonix] 40 mg PO DAILY #30 tablet.


   HYDROcodone/APAP 5-325MG [Norco 5-325] 1 tab PO Q4HR PRN 3 Days #18 tab


     PRN Reason: Pain





Continue


   LORazepam [Ativan] 0.5 mg PO Q8H PRN #9 tab


     PRN Reason: Anxiety


Discharge Medication List





Albuterol Nebulized [Ventolin Nebulized] 2.5 mg INHALATION RT-QID PRN  nebu 

10/04/19 [Rx]


Enoxaparin [Lovenox] 40 mg SQ DAILY  syringe 10/04/19 [Rx]


Ipratropium-Albuterol Nebulize [Duoneb 0.5 mg-3 mg/3 ml Soln] 3 ml INHALATION 

RT-QID  ampul.neb 10/04/19 [Rx]


LORazepam [Ativan] 0.5 mg PO Q8H PRN #9 tab 10/04/19 [Rx]


Pantoprazole Sodium [Protonix] 40 mg PO DAILY #30 tablet. 10/04/19 [Rx]


predniSONE See Taper PO AS DIRECTED #30 tab 10/04/19 [Rx]


HYDROcodone/APAP 5-325MG [Norco 5-325] 1 tab PO Q4HR PRN 3 Days #18 tab 10/07/19

[Rx]








Follow up Appointment(s)/Referral(s): 


Darnell Howell MD [STAFF PHYSICIAN] - 1 Week


Houston Jerome MD [Primary Care Provider] - 1-2 days


Activity/Diet/Wound Care/Special Instructions: 


Diet: Tube feeds


Follow-up PCP within 3 days of discharge.


Follow-up pulmonology within 2 weeks of discharge.





Continue Zosyn and follow-up BAL cultures for adjustment of antibiotics.


Continue prednisone taper.





Discharge Disposition: [facility] TER[facility]ARE HOSPITAL

## 2020-12-04 ENCOUNTER — HOSPITAL ENCOUNTER (OUTPATIENT)
Dept: HOSPITAL 47 - RADXRMAIN | Age: 55
Discharge: HOME | End: 2020-12-04
Attending: INTERNAL MEDICINE
Payer: COMMERCIAL

## 2020-12-04 ENCOUNTER — HOSPITAL ENCOUNTER (EMERGENCY)
Dept: HOSPITAL 47 - EC | Age: 55
Discharge: HOME | End: 2020-12-04
Payer: COMMERCIAL

## 2020-12-04 VITALS
HEART RATE: 98 BPM | DIASTOLIC BLOOD PRESSURE: 97 MMHG | RESPIRATION RATE: 18 BRPM | TEMPERATURE: 98 F | SYSTOLIC BLOOD PRESSURE: 149 MMHG

## 2020-12-04 DIAGNOSIS — Z92.21: ICD-10-CM

## 2020-12-04 DIAGNOSIS — Z85.21: ICD-10-CM

## 2020-12-04 DIAGNOSIS — S42.212A: Primary | ICD-10-CM

## 2020-12-04 DIAGNOSIS — Z91.018: ICD-10-CM
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
## 2020-12-04 DIAGNOSIS — Z93.0: ICD-10-CM

## 2020-12-04 DIAGNOSIS — Z92.3: ICD-10-CM

## 2020-12-04 PROCEDURE — 99283 EMERGENCY DEPT VISIT LOW MDM: CPT

## 2020-12-04 NOTE — ED
Upper Extremity HPI





- General


Chief Complaint: Extremity Injury, Upper


Stated Complaint: Left shoulder injury


Time Seen by Provider: 20 14:04


Source: patient


Mode of arrival: ambulatory


Limitations: no limitations





- History of Present Illness


Initial Comments: 





Patient is a 55-year-old female presenting to emergency Department with 

complaints of left shoulder pain for the past 3 weeks.  Patient was sent down to

the ER from having an outpatient x-ray of her left shoulder.  Patient has a 

history of a trach and is nonverbal but is writing her responses.  Patient 

states she tripped over her dog started about 3 weeks ago landing mostly on her 

left shoulder.  She is right-hand dominant.  Patient states she felt like it was

improving over the past few weeks so she never had it checked.  Patient denies 

any other injuries from this fall.  She states that the x-ray showed a fracture.

 She denies history of fever, chills.  Patient has no further complaints at this

time.





- Related Data


                                  Previous Rx's











 Medication  Instructions  Recorded


 


Albuterol Nebulized [Ventolin 2.5 mg INHALATION RT-QID PRN  nebu 10/04/19





Nebulized]  


 


Enoxaparin [Lovenox] 40 mg SQ DAILY  syringe 10/04/19


 


Ipratropium-Albuterol Nebulize 3 ml INHALATION RT-QID  ampul.neb 10/04/19





[Duoneb 0.5 mg-3 mg/3 ml Soln]  


 


LORazepam [Ativan] 0.5 mg PO Q8H PRN #9 tab 10/04/19


 


Pantoprazole Sodium [Protonix] 40 mg PO DAILY #30 tablet. 10/04/19


 


predniSONE See Taper PO AS DIRECTED #30 tab 10/04/19


 


HYDROcodone/APAP 5-325MG [Norco 1 tab PO Q4HR PRN 3 Days #18 tab 10/07/19





5-325]  


 


Hydrocodone/Acetaminophen [Norco 1 tab PO Q6HR PRN #12 tab 20





5-325]  











                                    Allergies











Allergy/AdvReac Type Severity Reaction Status Date / Time


 


Beef Containing Products AdvReac  No reaction Verified 19 11:40





[Beef]     


 


Fish Containing Products AdvReac  No reaction Verified 19 11:40





[Fish]     


 


Pork/Porcine Containing AdvReac  No reaction Verified 19 11:40





Products     





[Pork]     














Review of Systems


ROS Statement: 


Those systems with pertinent positive or pertinent negative responses have been 

documented in the HPI.





ROS Other: All systems not noted in ROS Statement are negative.





Past Medical History


Past Medical History: Cancer


Additional Past Medical History / Comment(s): Pt recently admitted to [facility] on 

19 with severe protein calorie malnourishment, acute respiratory failure 

with hypoxia and hypercapnia was intubated/vented, also had mediatinal 

lymphadenopathy, nyponatremia and hypokalemia.    Other Hx:  Laryngeal cancer 

treated with chemo/radiation 2019, dysphagia-NPO/has G tube.


History of Any Multi-Drug Resistant Organisms: None Reported


Past Surgical History: Tubal Ligation


Additional Past Surgical History / Comment(s): Open G tube placement, mediport, 

picc line, laryngoscopy, one fallopian tube removed d/t ectopic pregnancy.


Past Anesthesia/Blood Transfusion Reactions: No Reported Reaction


Past Psychological History: Anxiety


Smoking Status: Unknown if ever smoked


Past Alcohol Use History: None Reported


Past Drug Use History: None Reported





- Past Family History


  ** Mother


Family Medical History: Coronary Artery Disease (CAD), Diabetes Mellitus





  ** Father


Family Medical History: COPD, CVA/TIA, Myocardial Infarction (MI)


Additional Family Medical History / Comment(s): Father  of a MI at the age 

of 54 yrs.





General Exam





- General Exam Comments


Initial Comments: 





GENERAL: 


Patient is well-developed and well-nourished.  Patient is nontoxic and in no 

acute distress.





HEAD: 


Atraumatic, normocephalic.





EYES:


Pupils equal round and reactive to light, extraocular movements intact, sclera 

anicteric, conjunctiva are normal.  Eyelids were unremarkable.





ENT: 


TMs normal, nares patent, oropharynx clear without exudates.  Moist mucous 

membranes.





NECK: 


Normal range of motion, supple without lymphadenopathy or JVD.  Trach present. 





LUNGS:


Unlabored respirations.  Breath sounds clear to auscultation bilaterally and 

equal.  No wheezes rales or rhonchi.





HEART:


Regular rate and rhythm without murmurs, rubs or gallops.





ABDOMEN: 


Soft, nontender, normoactive bowel sounds.  No guarding, no rebound.  No masses 

appreciated.





: Deferred 





MUSCULOSKELETAL: 


Patient has pain with palpation of the left shoulder, anterior and posterior 

area.  She has very limited range of motion secondary to pain.  She is 

neurovascular intact.  She does have equal  strength.  Pain of the left 

elbow or lower arm.  No clubbing or cyanosis.





NEUROLOGICAL: 


Patient is alert and oriented x 3.  Motor and sensory are also intact.  Cranial 

nerves II through XII grossly intact.  Symmetrical smile.  Normal gait.   





PSYCH:


Normal mood, normal affect.





SKIN:


 Warm, Dry, normal turgor, no rashes or lesions noted.


Limitations: no limitations





Course


                                   Vital Signs











  20





  13:44


 


Temperature 98.0 F


 


Pulse Rate 98


 


Respiratory 18





Rate 


 


Blood Pressure 149/97


 


O2 Sat by Pulse 97





Oximetry 














Medical Decision Making





- Medical Decision Making





Patient is a 55-year-old female here for left shoulder pain 3 weeks after 

falling on it.  She was sent from outpatient x-ray secondary to her x-ray 

results.  X-rays of left shoulder today revealed an acute impacted comminuted 

fracture with several fracture fragments through the surgical neck of the left 

proximal humerus.  I spoke with on-call ortho, ABHAY Sharp, who agrees that 

we will sling patient and they will see her in the office.  I did give patient a

 Norco here and will give her a short prescription for this as well.  I also 

recommended ibuprofen to alternate with.  Patient is in agreement with this plan

 of care.  She is stable for discharge.  Case discussed with Dr. Rodriguez. 





Disposition


Clinical Impression: 


 Closed fracture of left proximal humerus





Disposition: HOME SELF-CARE


Condition: Stable


Instructions (If sedation given, give patient instructions):  Proximal Humerus 

Fracture (ED)


Additional Instructions: 


Please return to the Emergency Department if symptoms worsen or any other 

concerns.


Wear sling for comfort, alternate Tylenol and ibuprofen for pain, may take Norco

 for severe pain.  


Follow up with orthopedics as discussed.


Prescriptions: 


Hydrocodone/Acetaminophen [Norco 5-325] 1 tab PO Q6HR PRN #12 tab


 PRN Reason: Pain


Is patient prescribed a controlled substance at d/c from ED?: Yes


When asked, does pt state using other controlled substances?: No


If prescribed controlled substance>3 days was MAPS reviewed?: Prescribed <3 Days


If opioid is for acute pain is fill amount 7 days or less?: Yes


If Rx opioid, was Start Talking consent form obtained?: Yes


Referrals: 


Ed Jordan MD [Primary Care Provider] - 1-2 days


Fred Gaspar DO [Doctor of Osteopathic Medicine] - 1-2 days

## 2020-12-04 NOTE — XR
EXAMINATION TYPE: XR shoulder complete LT

 

DATE OF EXAM: 12/4/2020

 

CLINICAL HISTORY: Fall injury 2 days ago with pain.

 

TECHNIQUE:  Three views of the left shoulder are obtained.

 

COMPARISON: Most recent chest x-ray December 13, 2019. 

 

FINDINGS: Osseous structures are demineralized.  There is an acute impacted comminuted fracture with 
several fracture fragments through the surgical neck left proximal humerus. There are fracture fragme
nts involving the greater tuberosity. No glenohumeral joint dislocation. Partial visualization of tra
cheostomy tube.

 

IMPRESSION: As above. Orthopedic surgical referral advised. Impression: Diagnosis: Puckering of macula, left eye. Code: Q94.231. vision stable. Mac OCT today 4/15/2019 OCT wnl OD, ERM OS Plan: Continue to monitor.